# Patient Record
Sex: MALE | Race: WHITE | Employment: OTHER | URBAN - METROPOLITAN AREA
[De-identification: names, ages, dates, MRNs, and addresses within clinical notes are randomized per-mention and may not be internally consistent; named-entity substitution may affect disease eponyms.]

---

## 2017-01-09 ENCOUNTER — MED REC SCAN ONLY (OUTPATIENT)
Dept: INTERNAL MEDICINE CLINIC | Facility: CLINIC | Age: 77
End: 2017-01-09

## 2017-07-07 ENCOUNTER — APPOINTMENT (OUTPATIENT)
Dept: LAB | Age: 77
End: 2017-07-07
Attending: INTERNAL MEDICINE
Payer: COMMERCIAL

## 2017-07-07 DIAGNOSIS — E87.6 HYPOKALEMIA: ICD-10-CM

## 2017-07-07 DIAGNOSIS — E78.00 HYPERCHOLESTEROLEMIA: ICD-10-CM

## 2017-07-07 LAB
ALBUMIN SERPL-MCNC: 3.7 G/DL (ref 3.5–4.8)
ALP LIVER SERPL-CCNC: 77 U/L (ref 45–117)
ALT SERPL-CCNC: 32 U/L (ref 17–63)
AST SERPL-CCNC: 22 U/L (ref 15–41)
BILIRUB SERPL-MCNC: 0.7 MG/DL (ref 0.1–2)
BUN BLD-MCNC: 21 MG/DL (ref 8–20)
CALCIUM BLD-MCNC: 9 MG/DL (ref 8.3–10.3)
CHLORIDE: 105 MMOL/L (ref 101–111)
CHOLEST SMN-MCNC: 175 MG/DL (ref ?–200)
CO2: 32 MMOL/L (ref 22–32)
CREAT BLD-MCNC: 1.06 MG/DL (ref 0.7–1.3)
GLUCOSE BLD-MCNC: 104 MG/DL (ref 70–99)
HDLC SERPL-MCNC: 52 MG/DL (ref 45–?)
HDLC SERPL: 3.37 {RATIO} (ref ?–4.97)
LDLC SERPL CALC-MCNC: 104 MG/DL (ref ?–130)
M PROTEIN MFR SERPL ELPH: 7 G/DL (ref 6.1–8.3)
NONHDLC SERPL-MCNC: 123 MG/DL (ref ?–130)
POTASSIUM SERPL-SCNC: 4.3 MMOL/L (ref 3.6–5.1)
SODIUM SERPL-SCNC: 141 MMOL/L (ref 136–144)
TRIGLYCERIDES: 93 MG/DL (ref ?–150)
VLDL: 19 MG/DL (ref 5–40)

## 2017-07-07 PROCEDURE — 80053 COMPREHEN METABOLIC PANEL: CPT

## 2017-07-07 PROCEDURE — 80061 LIPID PANEL: CPT

## 2017-07-14 ENCOUNTER — OFFICE VISIT (OUTPATIENT)
Dept: INTERNAL MEDICINE CLINIC | Facility: CLINIC | Age: 77
End: 2017-07-14

## 2017-07-14 VITALS
HEART RATE: 54 BPM | HEIGHT: 71 IN | RESPIRATION RATE: 17 BRPM | SYSTOLIC BLOOD PRESSURE: 128 MMHG | OXYGEN SATURATION: 98 % | WEIGHT: 202 LBS | DIASTOLIC BLOOD PRESSURE: 66 MMHG | BODY MASS INDEX: 28.28 KG/M2 | TEMPERATURE: 98 F

## 2017-07-14 DIAGNOSIS — G89.29 CHRONIC PAIN OF BOTH SHOULDERS: Primary | ICD-10-CM

## 2017-07-14 DIAGNOSIS — M25.511 CHRONIC PAIN OF BOTH SHOULDERS: Primary | ICD-10-CM

## 2017-07-14 DIAGNOSIS — M25.512 CHRONIC PAIN OF BOTH SHOULDERS: Primary | ICD-10-CM

## 2017-07-14 DIAGNOSIS — E78.00 PURE HYPERCHOLESTEROLEMIA: ICD-10-CM

## 2017-07-14 DIAGNOSIS — I10 ESSENTIAL HYPERTENSION: ICD-10-CM

## 2017-07-14 PROCEDURE — 99213 OFFICE O/P EST LOW 20 MIN: CPT | Performed by: INTERNAL MEDICINE

## 2017-07-14 RX ORDER — METOPROLOL SUCCINATE 50 MG/1
50 TABLET, EXTENDED RELEASE ORAL DAILY
Qty: 90 TABLET | Refills: 1 | Status: SHIPPED | OUTPATIENT
Start: 2017-07-14 | End: 2017-07-16

## 2017-07-14 RX ORDER — ATORVASTATIN CALCIUM 20 MG/1
20 TABLET, FILM COATED ORAL DAILY
Qty: 90 TABLET | Refills: 1 | Status: SHIPPED | OUTPATIENT
Start: 2017-07-14 | End: 2017-07-16

## 2017-07-14 RX ORDER — HYDROCHLOROTHIAZIDE 25 MG/1
25 TABLET ORAL DAILY
Qty: 90 TABLET | Refills: 1 | Status: SHIPPED | OUTPATIENT
Start: 2017-07-14 | End: 2017-07-16

## 2017-07-14 RX ORDER — POTASSIUM CHLORIDE 750 MG/1
10 TABLET, FILM COATED, EXTENDED RELEASE ORAL DAILY
Qty: 90 TABLET | Refills: 2 | Status: SHIPPED | OUTPATIENT
Start: 2017-07-14 | End: 2018-04-02

## 2017-07-14 NOTE — PROGRESS NOTES
HPI:    Patient ID: Sudha Craft is a 78year old male.     HPI  Here for htn, and hyperchol, co bilateral shoulder discomfort and decreased rom  /66 (BP Location: Right arm, Patient Position: Sitting)   Pulse 54   Temp 97.6 °F (36.4 °C) (Oral) for pe    No orders of the defined types were placed in this encounter. Meds This Visit:  Signed Prescriptions Disp Refills    Metoprolol Succinate ER 50 MG Oral Tablet 24 Hr 90 tablet 1      Sig: Take 1 tablet (50 mg total) by mouth daily.       hydro

## 2017-07-16 DIAGNOSIS — I10 ESSENTIAL HYPERTENSION: ICD-10-CM

## 2017-07-17 RX ORDER — HYDROCHLOROTHIAZIDE 25 MG/1
25 TABLET ORAL DAILY
Qty: 90 TABLET | Refills: 1 | Status: SHIPPED
Start: 2017-07-17 | End: 2018-04-02

## 2017-07-17 RX ORDER — METOPROLOL SUCCINATE 50 MG/1
50 TABLET, EXTENDED RELEASE ORAL DAILY
Qty: 90 TABLET | Refills: 1 | Status: SHIPPED
Start: 2017-07-17 | End: 2018-04-02

## 2017-07-17 RX ORDER — POTASSIUM CHLORIDE 750 MG/1
10 TABLET, FILM COATED, EXTENDED RELEASE ORAL DAILY
Qty: 90 TABLET | Refills: 2
Start: 2017-07-17

## 2017-07-17 RX ORDER — ATORVASTATIN CALCIUM 20 MG/1
20 TABLET, FILM COATED ORAL DAILY
Qty: 90 TABLET | Refills: 1 | Status: SHIPPED
Start: 2017-07-17 | End: 2018-04-02

## 2017-07-17 NOTE — TELEPHONE ENCOUNTER
From: Thedora Snellen  Sent: 7/16/2017 9:16 PM CDT  Subject: Medication Renewal Request    Thedora Snellen would like a refill of the following medications:  Metoprolol Succinate ER 50 MG Oral Tablet 24 Hr [Sean Valdez MD]  hydrochlorothiazide

## 2017-07-26 ENCOUNTER — HOSPITAL ENCOUNTER (OUTPATIENT)
Dept: PHYSICAL THERAPY | Facility: HOSPITAL | Age: 77
Setting detail: THERAPIES SERIES
Discharge: HOME | End: 2017-07-26
Attending: INTERNAL MEDICINE
Payer: COMMERCIAL

## 2017-07-26 DIAGNOSIS — G89.29 CHRONIC PAIN OF BOTH SHOULDERS: ICD-10-CM

## 2017-07-26 DIAGNOSIS — M25.512 CHRONIC PAIN OF BOTH SHOULDERS: ICD-10-CM

## 2017-07-26 DIAGNOSIS — M25.511 CHRONIC PAIN OF BOTH SHOULDERS: ICD-10-CM

## 2017-07-26 PROCEDURE — 97161 PT EVAL LOW COMPLEX 20 MIN: CPT

## 2017-07-26 PROCEDURE — 97140 MANUAL THERAPY 1/> REGIONS: CPT

## 2017-07-26 PROCEDURE — 97110 THERAPEUTIC EXERCISES: CPT

## 2017-07-26 NOTE — PROGRESS NOTES
UPPER EXTREMITY EVALUATION:   Referring Physician: Dr. Kacey Bentonr  Diagnosis: Chronic pain of both shoulders (M25.512,G89.29,M25.511)     Date of Service: 7/26/2017     PATIENT João Bell is a 78year old y/o male who presents to therapy 160 (pain from 90 to end-range); L 160 (pain from 90 to end-range)  ER: R 70; L 80  IR: R T12 (much difficulty); L T12 (much difficulty)     PROM:   Shoulder    Flexion: R 160; L 160  Abduction: R 160; L 160  ER: R 90; L 70 at 60 deg of ABD  IR: R 30;  L 50 reach in back pocket, tuck in shirt, and turn steering wheel without pain (8 visits)  · Pt will demonstrate increased mid/low trap strength to 5-/5 to promote improved shoulder mechanics and stabilization with ADL such as lifting and reaching (8 visits)  ·

## 2017-07-31 ENCOUNTER — HOSPITAL ENCOUNTER (OUTPATIENT)
Dept: PHYSICAL THERAPY | Facility: HOSPITAL | Age: 77
Setting detail: THERAPIES SERIES
Discharge: HOME | End: 2017-07-31
Attending: INTERNAL MEDICINE
Payer: COMMERCIAL

## 2017-07-31 PROCEDURE — 97110 THERAPEUTIC EXERCISES: CPT

## 2017-07-31 PROCEDURE — 97140 MANUAL THERAPY 1/> REGIONS: CPT

## 2017-07-31 NOTE — PROGRESS NOTES
Dx: Chronic pain of both shoulders (M25.512,G89.29,M25.511)            Authorized # of Visits:  na         Next MD visit: none scheduled  Fall Risk: standard         Precautions: n/a             Subjective: Started doing HEP once per day.     Objective: L s R GHJ caudal and PA glide at end-range of FL and ABD MWM         (B) IR stretch and HOR ADD stretch         Red TB ER 3 x 10 (B)         Corner stretch (B)  Hor ADD stretch (B)         Skilled Services: pt education, manual therapy, progression of t

## 2017-08-02 ENCOUNTER — HOSPITAL ENCOUNTER (OUTPATIENT)
Dept: PHYSICAL THERAPY | Facility: HOSPITAL | Age: 77
Setting detail: THERAPIES SERIES
Discharge: HOME | End: 2017-08-02
Attending: INTERNAL MEDICINE
Payer: COMMERCIAL

## 2017-08-02 PROCEDURE — 97110 THERAPEUTIC EXERCISES: CPT

## 2017-08-02 PROCEDURE — 97140 MANUAL THERAPY 1/> REGIONS: CPT

## 2017-08-07 ENCOUNTER — HOSPITAL ENCOUNTER (OUTPATIENT)
Dept: PHYSICAL THERAPY | Facility: HOSPITAL | Age: 77
Setting detail: THERAPIES SERIES
Discharge: HOME | End: 2017-08-07
Attending: INTERNAL MEDICINE
Payer: COMMERCIAL

## 2017-08-07 PROCEDURE — 97110 THERAPEUTIC EXERCISES: CPT

## 2017-08-07 PROCEDURE — 97140 MANUAL THERAPY 1/> REGIONS: CPT

## 2017-08-07 NOTE — PROGRESS NOTES
Dx: Chronic pain of both shoulders (M25.512,G89.29,M25.511)            Authorized # of Visits:  na         Next MD visit: none scheduled  Fall Risk: standard         Precautions: n/a             Subjective: Reports some soreness in shoulders, about at the glide at 60 deg of ABD; at 90 deg of ABD L GHJ caudal glide at end-range of FL; ABD MWM L GHJ caudal glide at end-range of FL; ABD MWM       L shoulder ER mm energy stretch L GHJ PA glide at 60 deg of ABD; at 90 deg of ABD L GHJ PA glide at 60 deg of ABD;

## 2017-08-09 ENCOUNTER — HOSPITAL ENCOUNTER (OUTPATIENT)
Dept: PHYSICAL THERAPY | Facility: HOSPITAL | Age: 77
Setting detail: THERAPIES SERIES
Discharge: HOME | End: 2017-08-09
Attending: INTERNAL MEDICINE
Payer: COMMERCIAL

## 2017-08-09 PROCEDURE — 97110 THERAPEUTIC EXERCISES: CPT

## 2017-08-09 PROCEDURE — 97140 MANUAL THERAPY 1/> REGIONS: CPT

## 2017-08-09 NOTE — PROGRESS NOTES
Dx: Chronic pain of both shoulders (M25.512,G89.29,M25.511)            Authorized # of Visits:  na         Next MD visit: none scheduled  Fall Risk: standard         Precautions: n/a             Subjective: Both shoulders feel better.  It is easier to reach GHJ caudal glide at end-range of FL; ABD MWM Arm bike L1.8 4' 4' 4'      L GHJ PA glide at 60 deg of ABD; at 90 deg of ABD L GHJ caudal glide at end-range of FL; ABD MWM L GHJ caudal glide at end-range of FL; ABD MWM L GHJ caudal glide at end-range of FL;

## 2017-08-14 ENCOUNTER — HOSPITAL ENCOUNTER (OUTPATIENT)
Dept: PHYSICAL THERAPY | Facility: HOSPITAL | Age: 77
Setting detail: THERAPIES SERIES
Discharge: HOME | End: 2017-08-14
Attending: INTERNAL MEDICINE
Payer: COMMERCIAL

## 2017-08-14 PROCEDURE — 97110 THERAPEUTIC EXERCISES: CPT

## 2017-08-14 PROCEDURE — 97140 MANUAL THERAPY 1/> REGIONS: CPT

## 2017-08-14 NOTE — PROGRESS NOTES
Dx: Chronic pain of both shoulders (M25.512,G89.29,M25.511)            Authorized # of Visits:  na         Next MD visit: none scheduled  Fall Risk: standard         Precautions: n/a             Subjective: Both shoulders feel better.  It is easier to reach TX#: 7/ Date:               TX#: 8/   L GHJ caudal glide at end-range of FL; ABD MWM Arm bike L1.8 4' 4' 4' 4'     L GHJ PA glide at 60 deg of ABD; at 90 deg of ABD L GHJ caudal glide at end-range of FL; ABD MWM L GHJ caudal glide at end-range of FL

## 2017-08-16 ENCOUNTER — HOSPITAL ENCOUNTER (OUTPATIENT)
Dept: PHYSICAL THERAPY | Facility: HOSPITAL | Age: 77
Setting detail: THERAPIES SERIES
Discharge: HOME | End: 2017-08-16
Attending: INTERNAL MEDICINE
Payer: COMMERCIAL

## 2017-08-16 PROCEDURE — 97110 THERAPEUTIC EXERCISES: CPT

## 2017-08-16 PROCEDURE — 97140 MANUAL THERAPY 1/> REGIONS: CPT

## 2017-08-16 NOTE — PROGRESS NOTES
Dx: Chronic pain of both shoulders (M25.512,G89.29,M25.511)            Authorized # of Visits:  na         Next MD visit: none scheduled  Fall Risk: standard         Precautions: n/a             Subjective: Both shoulders feel better.  Reports decreased pascual progress achieved in PT (8 visits)      Plan: re-assess, review HEP  Date: 7/31/2017 TX#: 2/8 Date: 8/2/17               TX#: 3/8   Date:   8/7/17            TX#: 4/8 Date:  8/9/17             TX#: 5/8 Date:   8/14/17            TX#: 6/8 Date:  8/16/17

## 2017-08-21 ENCOUNTER — HOSPITAL ENCOUNTER (OUTPATIENT)
Dept: PHYSICAL THERAPY | Facility: HOSPITAL | Age: 77
Setting detail: THERAPIES SERIES
Discharge: HOME | End: 2017-08-21
Attending: INTERNAL MEDICINE
Payer: COMMERCIAL

## 2017-08-21 PROCEDURE — 97140 MANUAL THERAPY 1/> REGIONS: CPT

## 2017-08-21 PROCEDURE — 97110 THERAPEUTIC EXERCISES: CPT

## 2017-08-21 NOTE — PROGRESS NOTES
Physical Therapy Discharge Summary    Pt has attended 8 visits in Physical Therapy. Subjective: Lyubov Coleman reports that pain at night is less than before. He states that R shoulder no longer feels stiff or painful and has full range.  He notices improved ability to sleep without waking due to shoulder pain. Part met  · Pt will improve shoulder flexion AROM to >170 degrees to be able to reach into overhead cabinets without pain or restriction.  Part met  · Pt will improve shoulder abduction AROM to >170 degr L1.8 4' 4' 4' 4' 4' 4'   L GHJ PA glide at 60 deg of ABD; at 90 deg of ABD L GHJ caudal glide at end-range of FL; ABD MWM L GHJ caudal glide at end-range of FL; ABD MWM L GHJ caudal glide at end-range of FL; ABD MWM L L L   L shoulder ER mm energy stretch

## 2017-11-26 NOTE — PROGRESS NOTES
Dx: Chronic pain of both shoulders (M25.512,G89.29,M25.511)            Authorized # of Visits:  na         Next MD visit: none scheduled  Fall Risk: standard         Precautions: n/a             Subjective: Shoulders feel better and he noticed less pain at caudal glide at end-range of FL; ABD MWM Arm bike L1.8 4'        L GHJ PA glide at 60 deg of ABD; at 90 deg of ABD L GHJ caudal glide at end-range of FL; ABD MWM        L shoulder ER mm energy stretch L GHJ PA glide at 60 deg of ABD; at 90 deg of ABD WDL

## 2018-04-02 ENCOUNTER — OFFICE VISIT (OUTPATIENT)
Dept: INTERNAL MEDICINE CLINIC | Facility: CLINIC | Age: 78
End: 2018-04-02

## 2018-04-02 VITALS
BODY MASS INDEX: 28.7 KG/M2 | RESPIRATION RATE: 16 BRPM | WEIGHT: 205 LBS | HEIGHT: 71 IN | TEMPERATURE: 98 F | DIASTOLIC BLOOD PRESSURE: 80 MMHG | HEART RATE: 60 BPM | SYSTOLIC BLOOD PRESSURE: 136 MMHG

## 2018-04-02 DIAGNOSIS — G89.29 CHRONIC BILATERAL LOW BACK PAIN WITH LEFT-SIDED SCIATICA: ICD-10-CM

## 2018-04-02 DIAGNOSIS — Z12.5 PROSTATE CANCER SCREENING: ICD-10-CM

## 2018-04-02 DIAGNOSIS — Z13.6 ENCOUNTER FOR ABDOMINAL AORTIC ANEURYSM (AAA) SCREENING: ICD-10-CM

## 2018-04-02 DIAGNOSIS — Z00.00 ROUTINE GENERAL MEDICAL EXAMINATION AT A HEALTH CARE FACILITY: Primary | ICD-10-CM

## 2018-04-02 DIAGNOSIS — I10 ESSENTIAL HYPERTENSION: ICD-10-CM

## 2018-04-02 DIAGNOSIS — E78.00 PURE HYPERCHOLESTEROLEMIA: ICD-10-CM

## 2018-04-02 DIAGNOSIS — M54.42 CHRONIC BILATERAL LOW BACK PAIN WITH LEFT-SIDED SCIATICA: ICD-10-CM

## 2018-04-02 PROCEDURE — 99213 OFFICE O/P EST LOW 20 MIN: CPT | Performed by: INTERNAL MEDICINE

## 2018-04-02 PROCEDURE — G0439 PPPS, SUBSEQ VISIT: HCPCS | Performed by: INTERNAL MEDICINE

## 2018-04-02 RX ORDER — HYDROCHLOROTHIAZIDE 25 MG/1
25 TABLET ORAL DAILY
Qty: 90 TABLET | Refills: 3 | Status: SHIPPED | OUTPATIENT
Start: 2018-04-02 | End: 2019-05-29

## 2018-04-02 RX ORDER — METOPROLOL SUCCINATE 50 MG/1
50 TABLET, EXTENDED RELEASE ORAL DAILY
Qty: 90 TABLET | Refills: 3 | Status: SHIPPED | OUTPATIENT
Start: 2018-04-02 | End: 2019-05-29

## 2018-04-02 RX ORDER — ATORVASTATIN CALCIUM 20 MG/1
20 TABLET, FILM COATED ORAL DAILY
Qty: 90 TABLET | Refills: 3 | Status: SHIPPED | OUTPATIENT
Start: 2018-04-02 | End: 2019-05-29

## 2018-04-03 NOTE — PROGRESS NOTES
HPI:    Patient ID: Jasvir  is a 78year old male. HPI  Here for awv, feels will, htn, hyperchol, taking and tolerating meds, see awv form  /80 (BP Location: Right arm, Patient Position: Sitting, Cuff Size: adult)   Pulse 60   Temp 97. 7 exhibits no edema. Neurological: He is oriented to person, place, and time. No cranial nerve deficit. Skin: Skin is warm. He is not diaphoretic. Psychiatric: He has a normal mood and affect.               ASSESSMENT/PLAN:   Routine general medical exa

## 2018-04-06 ENCOUNTER — LAB ENCOUNTER (OUTPATIENT)
Dept: LAB | Age: 78
End: 2018-04-06
Attending: INTERNAL MEDICINE
Payer: COMMERCIAL

## 2018-04-06 ENCOUNTER — TELEPHONE (OUTPATIENT)
Dept: INTERNAL MEDICINE CLINIC | Facility: CLINIC | Age: 78
End: 2018-04-06

## 2018-04-06 DIAGNOSIS — E78.00 PURE HYPERCHOLESTEROLEMIA: ICD-10-CM

## 2018-04-06 DIAGNOSIS — Z12.5 PROSTATE CANCER SCREENING: ICD-10-CM

## 2018-04-06 DIAGNOSIS — I10 ESSENTIAL HYPERTENSION: ICD-10-CM

## 2018-04-06 DIAGNOSIS — I10 ESSENTIAL HYPERTENSION: Primary | ICD-10-CM

## 2018-04-06 PROCEDURE — 80061 LIPID PANEL: CPT

## 2018-04-06 PROCEDURE — 85025 COMPLETE CBC W/AUTO DIFF WBC: CPT

## 2018-04-06 PROCEDURE — 36415 COLL VENOUS BLD VENIPUNCTURE: CPT

## 2018-04-06 PROCEDURE — 80053 COMPREHEN METABOLIC PANEL: CPT

## 2018-04-06 NOTE — TELEPHONE ENCOUNTER
Calling to inform JL the AST and Potassium were hemolyzed the other test are fine if he needs the AST and potassium will need to re order.  Please advise

## 2018-04-09 NOTE — TELEPHONE ENCOUNTER
Called patient LM on VM informing CMP needs to be repeated due to hemolyzed blood. Patient to call back with any questions.

## 2018-05-11 ENCOUNTER — TELEPHONE (OUTPATIENT)
Dept: INTERNAL MEDICINE CLINIC | Facility: CLINIC | Age: 78
End: 2018-05-11

## 2018-05-11 ENCOUNTER — APPOINTMENT (OUTPATIENT)
Dept: LAB | Age: 78
End: 2018-05-11
Attending: INTERNAL MEDICINE
Payer: COMMERCIAL

## 2018-05-11 DIAGNOSIS — I10 ESSENTIAL HYPERTENSION: ICD-10-CM

## 2018-05-11 DIAGNOSIS — M54.41 CHRONIC RIGHT-SIDED LOW BACK PAIN WITH RIGHT-SIDED SCIATICA: Primary | ICD-10-CM

## 2018-05-11 DIAGNOSIS — G89.29 CHRONIC RIGHT-SIDED LOW BACK PAIN WITH RIGHT-SIDED SCIATICA: Primary | ICD-10-CM

## 2018-05-11 PROCEDURE — 80053 COMPREHEN METABOLIC PANEL: CPT

## 2018-05-11 NOTE — TELEPHONE ENCOUNTER
Patient stopped by the office requesting to speak with a nurse in regards to an upcoming procedure. He states it's in regards to a referral Dr Fawn Guerrier made to the urologist - MRI of prostate.

## 2018-05-11 NOTE — TELEPHONE ENCOUNTER
Patient asking if JL can order an MRI of his lower back which pain was discussed at his CPE appt 4/2/18.   Pt states he has low back pain, worse in the am, takes Aleve 2 tabs in the am which helps with the discomfort, pain rated at a 5, does have some sciat

## 2018-06-07 ENCOUNTER — OFFICE VISIT (OUTPATIENT)
Dept: SURGERY | Facility: CLINIC | Age: 78
End: 2018-06-07

## 2018-06-07 VITALS
BODY MASS INDEX: 27.3 KG/M2 | HEIGHT: 71 IN | DIASTOLIC BLOOD PRESSURE: 78 MMHG | WEIGHT: 195 LBS | RESPIRATION RATE: 16 BRPM | SYSTOLIC BLOOD PRESSURE: 135 MMHG | HEART RATE: 60 BPM | OXYGEN SATURATION: 97 %

## 2018-06-07 DIAGNOSIS — M51.36 DDD (DEGENERATIVE DISC DISEASE), LUMBAR: Primary | ICD-10-CM

## 2018-06-07 DIAGNOSIS — M47.812 SPONDYLOSIS OF CERVICAL REGION WITHOUT MYELOPATHY OR RADICULOPATHY: ICD-10-CM

## 2018-06-07 PROBLEM — M51.369 DDD (DEGENERATIVE DISC DISEASE), LUMBAR: Status: ACTIVE | Noted: 2018-06-07

## 2018-06-07 PROCEDURE — 99203 OFFICE O/P NEW LOW 30 MIN: CPT | Performed by: ANESTHESIOLOGY

## 2018-06-07 NOTE — H&P
Name: Tory Enciso   : 1940   DOS: 2018     Chief complaint: Low back pain    History of present illness:  Tory Enciso is a 78year old male with a one-year history of low back pain.   The patient complains of primarily axial low crow Years: 0.00         Quit date: 2/28/1985  Smokeless tobacco: Never Used                      Alcohol use: Yes           1.0 - 1.5 oz/week     Glasses of wine: 2 - 3 per week     Comment: cage 4/2/18      Review of  other systems:  A 10 point ROS was conduc diagnosis)  Spondylosis of cervical region without myelopathy or radiculopathy. Plan:     Patient is a 68-year-old gentleman with a one-year history of low back pain. Based upon his symptoms, I believe his pain is secondary to lumbar facet issues.   I

## 2018-06-07 NOTE — PROGRESS NOTES
HPI:    Patient ID: Sage Petersen is a 78year old male. HPI    Review of Systems         Current Outpatient Prescriptions:  Sulfamethoxazole-TMP -160 MG Oral Tab per tablet Take 1 tablet by mouth 2 (two) times daily.  Begin this medication th

## 2018-06-07 NOTE — PATIENT INSTRUCTIONS
Refill policies:    • Allow 2-3 business days for refills; controlled substances may take longer.   • Contact your pharmacy at least 5 days prior to running out of medication and have them send an electronic request or submit request through the “request re entire amount billed. Precertification and Prior Authorizations: If your physician has recommended that you have a procedure or additional testing performed.   [facility]) will contact your insurance carrier to obtain pre-certi

## 2018-06-08 ENCOUNTER — OFFICE VISIT (OUTPATIENT)
Dept: FAMILY MEDICINE CLINIC | Facility: CLINIC | Age: 78
End: 2018-06-08

## 2018-06-08 VITALS
SYSTOLIC BLOOD PRESSURE: 140 MMHG | RESPIRATION RATE: 16 BRPM | TEMPERATURE: 98 F | HEART RATE: 58 BPM | OXYGEN SATURATION: 99 % | DIASTOLIC BLOOD PRESSURE: 74 MMHG

## 2018-06-08 DIAGNOSIS — S09.301A INJURY OF TYMPANIC MEMBRANE OF RIGHT EAR, INITIAL ENCOUNTER: ICD-10-CM

## 2018-06-08 DIAGNOSIS — S00.411A EAR CANAL ABRASION, RIGHT, INITIAL ENCOUNTER: Primary | ICD-10-CM

## 2018-06-08 PROCEDURE — 99213 OFFICE O/P EST LOW 20 MIN: CPT | Performed by: NURSE PRACTITIONER

## 2018-06-08 RX ORDER — OFLOXACIN 3 MG/ML
10 SOLUTION AURICULAR (OTIC) DAILY
Qty: 1 BOTTLE | Refills: 0 | Status: SHIPPED | OUTPATIENT
Start: 2018-06-08 | End: 2018-06-15

## 2018-06-08 NOTE — PATIENT INSTRUCTIONS
Please follow up with your doctor in the next 2-3 days for reevaluation. Please go to the immediate care or ER if bleeding starts again or if you develop any worsening symptoms such as pain or difficulty hearing. Please keep water out of your ear.       R © 6252-8406 [employer] [address]. [address] [facility] [place], [facility] [place]. All rights reserved. This information is not intended as a substitute for professional medical care. Always follow your healthcare professional's instructions.         Externa · If you feel water trapped in your ear, use ear drops right away. You can get these drops over the counter at most drugstores.  They work by removing water from the ear canal.  Follow-up care  Follow up with your healthcare provider in 1 week, or as advise · You may use acetaminophen or ibuprofen to control pain, unless another medicine was prescribed.  Note: If you have chronic liver or kidney disease or ever had a stomach ulcer or GI bleeding, talk to your healthcare provider before taking any of these medi

## 2018-06-08 NOTE — PROGRESS NOTES
CHIEF COMPLAINT:   Patient presents with:  Ear Problem      HPI:   Rashawn Zacarias is a 78year old male who presents to clinic today with complaints of bleeding from right ear and muffled hearing.  Pt states last night he was cleaning his ear with a q /74 (BP Location: Right arm)   Pulse 58   Temp 98.1 °F (36.7 °C) (Oral)   Resp 16   SpO2 99%   GENERAL: well developed, well nourished,in no apparent distress  SKIN: no rashes,no suspicious lesions  HEAD: atraumatic, normocephalic  EYES: conjunctiva PLAN: Pt reported hearing now normal after removal of clot from right EAC. Meds as listed below. Comfort measures as described in Patient Instructions. Advised follow up with PCP or ENT in 2-3 days for reevaluation.  Advised follow up sooner if pt develops · Keep a clean cotton ball in the ear canal to absorb drainage. Change the cotton often, when it becomes soiled with fluid drainage. Don’t let water get into the ear.  Don’t put any medicine drops into the ear unless your healthcare provider tells you to do · Use prescribed ear drops as directed. These help reduce swelling and fight the infection. If an ear wick was placed in the ear canal, apply drops right onto the end of the wick.  The wick will draw the medicine into the ear canal even if it is swollen maryann External otitis (also called “swimmer’s ear”) is an infection in the ear canal. It is often caused by bacteria or fungus. It can occur a few days after water gets trapped in the ear canal (from swimming or bathing).  It can also occur after cleaning too shira · Ear pain becomes worse or doesn’t improve after 3 days of treatment  · Redness or swelling of the outer ear occurs or gets worse  · Headache  · Painful or stiff neck  · Drowsiness or confusion  · Fever of 100.4ºF (38ºC) or higher, or as directed by your

## 2018-06-11 ENCOUNTER — LAB ENCOUNTER (OUTPATIENT)
Dept: LAB | Age: 78
End: 2018-06-11
Attending: UROLOGY
Payer: MEDICARE

## 2018-06-11 DIAGNOSIS — Z12.5 SPECIAL SCREENING FOR MALIGNANT NEOPLASM OF PROSTATE: ICD-10-CM

## 2018-06-11 PROCEDURE — 87077 CULTURE AEROBIC IDENTIFY: CPT

## 2018-06-11 PROCEDURE — 87086 URINE CULTURE/COLONY COUNT: CPT

## 2018-06-11 PROCEDURE — 81001 URINALYSIS AUTO W/SCOPE: CPT

## 2018-06-14 PROCEDURE — 88305 TISSUE EXAM BY PATHOLOGIST: CPT | Performed by: UROLOGY

## 2018-06-14 PROCEDURE — 88344 IMHCHEM/IMCYTCHM EA MLT ANTB: CPT | Performed by: UROLOGY

## 2018-06-20 DIAGNOSIS — I10 ESSENTIAL HYPERTENSION: ICD-10-CM

## 2018-06-20 RX ORDER — HYDROCHLOROTHIAZIDE 25 MG/1
TABLET ORAL
Qty: 90 TABLET | Refills: 0 | OUTPATIENT
Start: 2018-06-20

## 2018-06-20 RX ORDER — ATORVASTATIN CALCIUM 20 MG/1
TABLET, FILM COATED ORAL
Qty: 90 TABLET | Refills: 0 | OUTPATIENT
Start: 2018-06-20

## 2018-06-20 RX ORDER — METOPROLOL SUCCINATE 50 MG/1
TABLET, EXTENDED RELEASE ORAL
Qty: 90 TABLET | Refills: 0 | OUTPATIENT
Start: 2018-06-20

## 2018-09-06 ENCOUNTER — TELEPHONE (OUTPATIENT)
Dept: SURGERY | Facility: CLINIC | Age: 78
End: 2018-09-06

## 2018-09-06 NOTE — TELEPHONE ENCOUNTER
rcvd medicare initial evaluation from 9/5/18 from [address] [place] [address].  Endorsed to Dr Vipin Cuba

## 2018-10-19 ENCOUNTER — APPOINTMENT (OUTPATIENT)
Dept: LAB | Age: 78
End: 2018-10-19
Attending: UROLOGY
Payer: MEDICARE

## 2018-10-19 DIAGNOSIS — C61 PROSTATE CANCER (HCC): ICD-10-CM

## 2018-10-19 PROCEDURE — 84153 ASSAY OF PSA TOTAL: CPT

## 2018-10-19 PROCEDURE — 36415 COLL VENOUS BLD VENIPUNCTURE: CPT

## 2019-02-18 ENCOUNTER — TELEPHONE (OUTPATIENT)
Dept: INTERNAL MEDICINE CLINIC | Facility: CLINIC | Age: 79
End: 2019-02-18

## 2019-02-18 DIAGNOSIS — I10 ESSENTIAL HYPERTENSION: ICD-10-CM

## 2019-02-18 DIAGNOSIS — E78.00 PURE HYPERCHOLESTEROLEMIA: Primary | ICD-10-CM

## 2019-02-18 NOTE — TELEPHONE ENCOUNTER
Future Appointments   Date Time Provider Emanuel Donnelly   5/29/2019  8:00 AM Jeffery Kelly MD [facility]     Orders to edward- Pt aware to fast-no call back required    This is medicare wellness visit

## 2019-02-27 ENCOUNTER — APPOINTMENT (OUTPATIENT)
Dept: LAB | Age: 79
End: 2019-02-27
Attending: UROLOGY
Payer: MEDICARE

## 2019-02-27 DIAGNOSIS — C61 PROSTATE CANCER (HCC): ICD-10-CM

## 2019-02-27 LAB — PSA SERPL-MCNC: 9.47 NG/ML (ref ?–4)

## 2019-02-27 PROCEDURE — 36415 COLL VENOUS BLD VENIPUNCTURE: CPT

## 2019-02-27 PROCEDURE — 84153 ASSAY OF PSA TOTAL: CPT

## 2019-05-23 ENCOUNTER — LAB ENCOUNTER (OUTPATIENT)
Dept: LAB | Age: 79
End: 2019-05-23
Attending: INTERNAL MEDICINE
Payer: MEDICARE

## 2019-05-23 DIAGNOSIS — E78.00 PURE HYPERCHOLESTEROLEMIA: ICD-10-CM

## 2019-05-23 DIAGNOSIS — I10 ESSENTIAL HYPERTENSION: ICD-10-CM

## 2019-05-23 PROCEDURE — 80061 LIPID PANEL: CPT

## 2019-05-23 PROCEDURE — 80053 COMPREHEN METABOLIC PANEL: CPT

## 2019-05-23 PROCEDURE — 85025 COMPLETE CBC W/AUTO DIFF WBC: CPT

## 2019-05-23 PROCEDURE — 36415 COLL VENOUS BLD VENIPUNCTURE: CPT

## 2019-05-29 ENCOUNTER — OFFICE VISIT (OUTPATIENT)
Dept: INTERNAL MEDICINE CLINIC | Facility: CLINIC | Age: 79
End: 2019-05-29
Payer: MEDICARE

## 2019-05-29 VITALS
RESPIRATION RATE: 16 BRPM | HEART RATE: 60 BPM | BODY MASS INDEX: 28 KG/M2 | DIASTOLIC BLOOD PRESSURE: 64 MMHG | WEIGHT: 201.38 LBS | SYSTOLIC BLOOD PRESSURE: 128 MMHG

## 2019-05-29 DIAGNOSIS — M54.42 CHRONIC BILATERAL LOW BACK PAIN WITH BILATERAL SCIATICA: ICD-10-CM

## 2019-05-29 DIAGNOSIS — G89.29 CHRONIC BILATERAL LOW BACK PAIN WITH BILATERAL SCIATICA: ICD-10-CM

## 2019-05-29 DIAGNOSIS — E78.00 PURE HYPERCHOLESTEROLEMIA: Primary | ICD-10-CM

## 2019-05-29 DIAGNOSIS — M54.41 CHRONIC BILATERAL LOW BACK PAIN WITH BILATERAL SCIATICA: ICD-10-CM

## 2019-05-29 DIAGNOSIS — I10 ESSENTIAL HYPERTENSION: ICD-10-CM

## 2019-05-29 DIAGNOSIS — Z00.00 ROUTINE GENERAL MEDICAL EXAMINATION AT A HEALTH CARE FACILITY: ICD-10-CM

## 2019-05-29 DIAGNOSIS — Z12.11 SPECIAL SCREENING FOR MALIGNANT NEOPLASMS, COLON: ICD-10-CM

## 2019-05-29 PROCEDURE — 99213 OFFICE O/P EST LOW 20 MIN: CPT | Performed by: INTERNAL MEDICINE

## 2019-05-29 PROCEDURE — G0439 PPPS, SUBSEQ VISIT: HCPCS | Performed by: INTERNAL MEDICINE

## 2019-05-29 RX ORDER — PRAVASTATIN SODIUM 20 MG
20 TABLET ORAL NIGHTLY
Qty: 90 TABLET | Refills: 1 | Status: SHIPPED | OUTPATIENT
Start: 2019-05-29 | End: 2019-09-04

## 2019-05-29 RX ORDER — METOPROLOL SUCCINATE 50 MG/1
50 TABLET, EXTENDED RELEASE ORAL DAILY
Qty: 90 TABLET | Refills: 3 | Status: SHIPPED | OUTPATIENT
Start: 2019-05-29 | End: 2020-05-06

## 2019-05-29 RX ORDER — HYDROCHLOROTHIAZIDE 25 MG/1
25 TABLET ORAL DAILY
Qty: 90 TABLET | Refills: 3 | Status: SHIPPED | OUTPATIENT
Start: 2019-05-29 | End: 2020-05-06

## 2019-05-29 NOTE — PROGRESS NOTES
HPI:    Patient ID: Rolan Morgan is a 70year old male.     HPI  Here for awv, see form filled out for details, hyperchol stopped statin as had shoulder and thigh myalgias, htn, chronic low back pain now with bilateral leg pain down through buttocks a murmur heard. Pulmonary/Chest: Effort normal and breath sounds normal. He has no wheezes. Abdominal: Soft. There is no tenderness. Genitourinary: Prostate normal and penis normal.   Musculoskeletal: He exhibits no edema.    Neurological: He is oriented

## 2019-07-01 ENCOUNTER — HOSPITAL ENCOUNTER (OUTPATIENT)
Dept: LAB | Facility: HOSPITAL | Age: 79
Discharge: HOME | End: 2019-07-01
Attending: UROLOGY
Payer: MEDICARE

## 2019-07-01 ENCOUNTER — OFFICE VISIT (OUTPATIENT)
Dept: SURGERY | Facility: CLINIC | Age: 79
End: 2019-07-01
Payer: MEDICARE

## 2019-07-01 VITALS
BODY MASS INDEX: 27.72 KG/M2 | SYSTOLIC BLOOD PRESSURE: 140 MMHG | WEIGHT: 198 LBS | HEART RATE: 62 BPM | DIASTOLIC BLOOD PRESSURE: 72 MMHG | HEIGHT: 71 IN

## 2019-07-01 DIAGNOSIS — M51.36 DDD (DEGENERATIVE DISC DISEASE), LUMBAR: ICD-10-CM

## 2019-07-01 DIAGNOSIS — M48.061 FORAMINAL STENOSIS OF LUMBAR REGION: ICD-10-CM

## 2019-07-01 DIAGNOSIS — M54.41 CHRONIC BILATERAL LOW BACK PAIN WITH BILATERAL SCIATICA: ICD-10-CM

## 2019-07-01 DIAGNOSIS — M47.817 FACET ARTHRITIS OF LUMBOSACRAL REGION: ICD-10-CM

## 2019-07-01 DIAGNOSIS — G89.29 CHRONIC BILATERAL LOW BACK PAIN WITH BILATERAL SCIATICA: ICD-10-CM

## 2019-07-01 DIAGNOSIS — M48.062 SPINAL STENOSIS OF LUMBAR REGION WITH NEUROGENIC CLAUDICATION: Primary | ICD-10-CM

## 2019-07-01 DIAGNOSIS — M54.42 CHRONIC BILATERAL LOW BACK PAIN WITH BILATERAL SCIATICA: ICD-10-CM

## 2019-07-01 DIAGNOSIS — C61 PROSTATE CANCER (HCC): ICD-10-CM

## 2019-07-01 DIAGNOSIS — M24.28 LIGAMENTUM FLAVUM HYPERTROPHY: ICD-10-CM

## 2019-07-01 LAB — PSA SERPL-MCNC: 10.5 NG/ML (ref ?–4)

## 2019-07-01 PROCEDURE — 99204 OFFICE O/P NEW MOD 45 MIN: CPT | Performed by: NEUROLOGICAL SURGERY

## 2019-07-01 PROCEDURE — 84153 ASSAY OF PSA TOTAL: CPT

## 2019-07-01 PROCEDURE — 36415 COLL VENOUS BLD VENIPUNCTURE: CPT

## 2019-07-01 NOTE — H&P
Patient: Dontae Givens  Medical Record Number: [id number]  YOB: 1940  PCP: Aquiles Milian MD    Referring Physician: Dr. Faith Germain  Reason for visit: Chronic bilateral low back pain with bilateral sciatica    Dear Dr. Faith Germain   Thank Lump or mass in breast    • Psychosexual dysfunction with inhibited sexual excitement    • Sciatica       Past Surgical History:   Procedure Laterality Date   • COLONOSCOPY  1/5/17    Dr. Gisela Millan   • TONSILLECTOMY      with adenoidectomy      Family History alert and orientated. Speech fluent, comprehension intact, answering questions appropriately. SPINE:  Gait/Coordination: Intact, non-antalgic gait. Able to heel balance on one foot bilaterally, reports equal strength.  Unable to toe balance on right toe T12-L1 level. The distal visualized cord appears  grossly unremarkable, but evaluation is very limited. Discs: Mild decreased disc height at L3-4 and L4-5 levels.   Vertebral body heights: Well-maintained  T12-L1: Unremarkable  L1-L2: Unremarkable  L2-L3: but the is incompletely evaluated.  =====  IMPRESSION:   Most significant findings at L4-5 and L3-L4 levels. Please correlate clinically for L3, L4, and L5  radiculopathy.  Please see above for details and additional findings at other levels of the lumbar counseling. Thank you very much for the kind referral.  Respectfully yours,    Kailyn Bowden.  Pietro Odell M.S., PA-C  [facility]  [address] [facility], [facility], [address] [place] [address] Rd  [phone]  7/1/20

## 2019-07-01 NOTE — PROGRESS NOTES
Location of Pain:  Pt states that he has lower bacjk pain. Pt states that he has pain in the bilateral legs more so in the right. Pt states that he has issues with numbness and tingling, Pt states that he has weakness.  Pt states that he has no issues with

## 2019-08-21 ENCOUNTER — HOSPITAL ENCOUNTER (INPATIENT)
Facility: HOSPITAL | Age: 79
LOS: 3 days | Discharge: HOME | DRG: 340 | End: 2019-08-24
Attending: EMERGENCY MEDICINE | Admitting: COLON & RECTAL SURGERY
Payer: MEDICARE

## 2019-08-21 ENCOUNTER — OFFICE VISIT (OUTPATIENT)
Dept: INTERNAL MEDICINE CLINIC | Facility: CLINIC | Age: 79
End: 2019-08-21
Payer: MEDICARE

## 2019-08-21 ENCOUNTER — ANESTHESIA EVENT (OUTPATIENT)
Dept: SURGERY | Facility: HOSPITAL | Age: 79
DRG: 340 | End: 2019-08-21
Payer: MEDICARE

## 2019-08-21 ENCOUNTER — ANESTHESIA (OUTPATIENT)
Dept: SURGERY | Facility: HOSPITAL | Age: 79
DRG: 340 | End: 2019-08-21
Payer: MEDICARE

## 2019-08-21 ENCOUNTER — HOSPITAL ENCOUNTER (OUTPATIENT)
Dept: CT IMAGING | Facility: HOSPITAL | Age: 79
Discharge: HOME | DRG: 340 | End: 2019-08-21
Attending: NURSE PRACTITIONER
Payer: MEDICARE

## 2019-08-21 ENCOUNTER — TELEPHONE (OUTPATIENT)
Dept: INTERNAL MEDICINE CLINIC | Facility: CLINIC | Age: 79
End: 2019-08-21

## 2019-08-21 ENCOUNTER — LAB ENCOUNTER (OUTPATIENT)
Dept: LAB | Facility: HOSPITAL | Age: 79
DRG: 340 | End: 2019-08-21
Attending: NURSE PRACTITIONER
Payer: MEDICARE

## 2019-08-21 VITALS
HEIGHT: 71 IN | HEART RATE: 64 BPM | WEIGHT: 195 LBS | TEMPERATURE: 98 F | BODY MASS INDEX: 27.3 KG/M2 | SYSTOLIC BLOOD PRESSURE: 120 MMHG | DIASTOLIC BLOOD PRESSURE: 60 MMHG

## 2019-08-21 DIAGNOSIS — R19.7 DIARRHEA, UNSPECIFIED TYPE: ICD-10-CM

## 2019-08-21 DIAGNOSIS — R10.31 RIGHT LOWER QUADRANT ABDOMINAL PAIN: Primary | ICD-10-CM

## 2019-08-21 DIAGNOSIS — R10.84 GENERALIZED ABDOMINAL PAIN: ICD-10-CM

## 2019-08-21 DIAGNOSIS — E78.00 PURE HYPERCHOLESTEROLEMIA: ICD-10-CM

## 2019-08-21 DIAGNOSIS — K37 APPENDICITIS: ICD-10-CM

## 2019-08-21 DIAGNOSIS — R10.31 RIGHT LOWER QUADRANT ABDOMINAL PAIN: ICD-10-CM

## 2019-08-21 DIAGNOSIS — K35.20 ACUTE APPENDICITIS WITH GENERALIZED PERITONITIS, WITHOUT GANGRENE OR ABSCESS, UNSPECIFIED WHETHER PERFORATION PRESENT: Primary | ICD-10-CM

## 2019-08-21 PROBLEM — K35.209 ACUTE APPENDICITIS WITH GENERALIZED PERITONITIS, WITHOUT GANGRENE OR ABSCESS, UNSPECIFIED WHETHER PERFORATION PRESENT: Status: ACTIVE | Noted: 2019-08-21

## 2019-08-21 PROBLEM — K35.209 ACUTE APPENDICITIS WITH GENERALIZED PERITONITIS, WITHOUT GANGRENE OR ABSCESS: Status: ACTIVE | Noted: 2019-08-21

## 2019-08-21 LAB
ALBUMIN SERPL-MCNC: 3 G/DL (ref 3.4–5)
ALBUMIN/GLOB SERPL: 0.7 {RATIO} (ref 1–2)
ALP LIVER SERPL-CCNC: 70 U/L (ref 45–117)
ALT SERPL-CCNC: 23 U/L (ref 16–61)
AMYLASE SERPL-CCNC: 18 U/L (ref 25–115)
ANION GAP SERPL CALC-SCNC: 9 MMOL/L (ref 0–18)
AST SERPL-CCNC: 19 U/L (ref 15–37)
BASOPHILS # BLD AUTO: 0.02 X10(3) UL (ref 0–0.2)
BASOPHILS NFR BLD AUTO: 0.1 %
BILIRUB SERPL-MCNC: 1.5 MG/DL (ref 0.1–2)
BUN BLD-MCNC: 17 MG/DL (ref 7–18)
BUN/CREAT SERPL: 15.5 (ref 10–20)
CALCIUM BLD-MCNC: 9.5 MG/DL (ref 8.5–10.1)
CHLORIDE SERPL-SCNC: 100 MMOL/L (ref 98–112)
CHOLEST SMN-MCNC: 146 MG/DL (ref ?–200)
CO2 SERPL-SCNC: 27 MMOL/L (ref 21–32)
CREAT BLD-MCNC: 1.1 MG/DL (ref 0.7–1.3)
CREAT BLD-MCNC: 1.3 MG/DL (ref 0.7–1.3)
DEPRECATED RDW RBC AUTO: 42.9 FL (ref 35.1–46.3)
EOSINOPHIL # BLD AUTO: 0.04 X10(3) UL (ref 0–0.7)
EOSINOPHIL NFR BLD AUTO: 0.2 %
ERYTHROCYTE [DISTWIDTH] IN BLOOD BY AUTOMATED COUNT: 13 % (ref 11–15)
GLOBULIN PLAS-MCNC: 4.5 G/DL (ref 2.8–4.4)
GLUCOSE BLD-MCNC: 96 MG/DL (ref 70–99)
HCT VFR BLD AUTO: 46.4 % (ref 39–53)
HDLC SERPL-MCNC: 48 MG/DL (ref 40–59)
HGB BLD-MCNC: 15.6 G/DL (ref 13–17.5)
IMM GRANULOCYTES # BLD AUTO: 0.09 X10(3) UL (ref 0–1)
IMM GRANULOCYTES NFR BLD: 0.5 %
LDLC SERPL CALC-MCNC: 76 MG/DL (ref ?–100)
LIPASE SERPL-CCNC: 75 U/L (ref 73–393)
LYMPHOCYTES # BLD AUTO: 0.84 X10(3) UL (ref 1–4)
LYMPHOCYTES NFR BLD AUTO: 4.8 %
M PROTEIN MFR SERPL ELPH: 7.5 G/DL (ref 6.4–8.2)
MCH RBC QN AUTO: 30.5 PG (ref 26–34)
MCHC RBC AUTO-ENTMCNC: 33.6 G/DL (ref 31–37)
MCV RBC AUTO: 90.6 FL (ref 80–100)
MONOCYTES # BLD AUTO: 0.81 X10(3) UL (ref 0.1–1)
MONOCYTES NFR BLD AUTO: 4.6 %
NEUTROPHILS # BLD AUTO: 15.78 X10 (3) UL (ref 1.5–7.7)
NEUTROPHILS # BLD AUTO: 15.78 X10(3) UL (ref 1.5–7.7)
NEUTROPHILS NFR BLD AUTO: 89.8 %
NONHDLC SERPL-MCNC: 98 MG/DL (ref ?–130)
OSMOLALITY SERPL CALC.SUM OF ELEC: 283 MOSM/KG (ref 275–295)
PLATELET # BLD AUTO: 182 10(3)UL (ref 150–450)
POTASSIUM SERPL-SCNC: 3.8 MMOL/L (ref 3.5–5.1)
RBC # BLD AUTO: 5.12 X10(6)UL (ref 3.8–5.8)
SODIUM SERPL-SCNC: 136 MMOL/L (ref 136–145)
TRIGL SERPL-MCNC: 108 MG/DL (ref 30–149)
VLDLC SERPL CALC-MCNC: 22 MG/DL (ref 0–30)
WBC # BLD AUTO: 17.6 X10(3) UL (ref 4–11)

## 2019-08-21 PROCEDURE — 36415 COLL VENOUS BLD VENIPUNCTURE: CPT

## 2019-08-21 PROCEDURE — 82565 ASSAY OF CREATININE: CPT

## 2019-08-21 PROCEDURE — 80053 COMPREHEN METABOLIC PANEL: CPT

## 2019-08-21 PROCEDURE — 85025 COMPLETE CBC W/AUTO DIFF WBC: CPT

## 2019-08-21 PROCEDURE — 80061 LIPID PANEL: CPT

## 2019-08-21 PROCEDURE — 74177 CT ABD & PELVIS W/CONTRAST: CPT | Performed by: NURSE PRACTITIONER

## 2019-08-21 PROCEDURE — 83690 ASSAY OF LIPASE: CPT

## 2019-08-21 PROCEDURE — 0DTJ4ZZ RESECTION OF APPENDIX, PERCUTANEOUS ENDOSCOPIC APPROACH: ICD-10-PCS | Performed by: COLON & RECTAL SURGERY

## 2019-08-21 PROCEDURE — 82150 ASSAY OF AMYLASE: CPT

## 2019-08-21 PROCEDURE — 99214 OFFICE O/P EST MOD 30 MIN: CPT | Performed by: NURSE PRACTITIONER

## 2019-08-21 RX ORDER — HYDROMORPHONE HYDROCHLORIDE 1 MG/ML
0.5 INJECTION, SOLUTION INTRAMUSCULAR; INTRAVENOUS; SUBCUTANEOUS EVERY 2 HOUR PRN
Status: DISCONTINUED | OUTPATIENT
Start: 2019-08-21 | End: 2019-08-21 | Stop reason: HOSPADM

## 2019-08-21 RX ORDER — SODIUM CHLORIDE 9 MG/ML
80 INJECTION, SOLUTION INTRAVENOUS CONTINUOUS
Status: DISCONTINUED | OUTPATIENT
Start: 2019-08-21 | End: 2019-08-24

## 2019-08-21 RX ORDER — HYDROMORPHONE HYDROCHLORIDE 1 MG/ML
0.2 INJECTION, SOLUTION INTRAMUSCULAR; INTRAVENOUS; SUBCUTANEOUS EVERY 2 HOUR PRN
Status: DISCONTINUED | OUTPATIENT
Start: 2019-08-21 | End: 2019-08-24

## 2019-08-21 RX ORDER — HYDROMORPHONE HYDROCHLORIDE 1 MG/ML
0.3 INJECTION, SOLUTION INTRAMUSCULAR; INTRAVENOUS; SUBCUTANEOUS EVERY 2 HOUR PRN
Status: DISCONTINUED | OUTPATIENT
Start: 2019-08-21 | End: 2019-08-24

## 2019-08-21 RX ORDER — ACETAMINOPHEN 500 MG
1000 TABLET ORAL EVERY 8 HOURS
Status: DISCONTINUED | OUTPATIENT
Start: 2019-08-21 | End: 2019-08-24

## 2019-08-21 RX ORDER — ONDANSETRON 2 MG/ML
4 INJECTION INTRAMUSCULAR; INTRAVENOUS EVERY 4 HOURS PRN
Status: DISCONTINUED | OUTPATIENT
Start: 2019-08-21 | End: 2019-08-24

## 2019-08-21 RX ORDER — METOCLOPRAMIDE HYDROCHLORIDE 5 MG/ML
10 INJECTION INTRAMUSCULAR; INTRAVENOUS AS NEEDED
Status: DISCONTINUED | OUTPATIENT
Start: 2019-08-21 | End: 2019-08-21 | Stop reason: HOSPADM

## 2019-08-21 RX ORDER — ONDANSETRON 2 MG/ML
4 INJECTION INTRAMUSCULAR; INTRAVENOUS AS NEEDED
Status: DISCONTINUED | OUTPATIENT
Start: 2019-08-21 | End: 2019-08-21 | Stop reason: HOSPADM

## 2019-08-21 RX ORDER — SODIUM CHLORIDE, SODIUM LACTATE, POTASSIUM CHLORIDE, CALCIUM CHLORIDE 600; 310; 30; 20 MG/100ML; MG/100ML; MG/100ML; MG/100ML
INJECTION, SOLUTION INTRAVENOUS CONTINUOUS
Status: DISCONTINUED | OUTPATIENT
Start: 2019-08-21 | End: 2019-08-21 | Stop reason: HOSPADM

## 2019-08-21 RX ORDER — KETOROLAC TROMETHAMINE 15 MG/ML
15 INJECTION, SOLUTION INTRAMUSCULAR; INTRAVENOUS EVERY 6 HOURS
Status: COMPLETED | OUTPATIENT
Start: 2019-08-21 | End: 2019-08-22

## 2019-08-21 RX ORDER — OXYCODONE HYDROCHLORIDE 5 MG/1
5 TABLET ORAL EVERY 4 HOURS PRN
Status: DISCONTINUED | OUTPATIENT
Start: 2019-08-21 | End: 2019-08-24

## 2019-08-21 RX ORDER — HYDROMORPHONE HYDROCHLORIDE 1 MG/ML
1 INJECTION, SOLUTION INTRAMUSCULAR; INTRAVENOUS; SUBCUTANEOUS EVERY 2 HOUR PRN
Status: DISCONTINUED | OUTPATIENT
Start: 2019-08-21 | End: 2019-08-21 | Stop reason: HOSPADM

## 2019-08-21 RX ORDER — CEFOXITIN 1 G/1
INJECTION, POWDER, FOR SOLUTION INTRAVENOUS
Status: DISCONTINUED | OUTPATIENT
Start: 2019-08-21 | End: 2019-08-21 | Stop reason: HOSPADM

## 2019-08-21 RX ORDER — NALOXONE HYDROCHLORIDE 0.4 MG/ML
80 INJECTION, SOLUTION INTRAMUSCULAR; INTRAVENOUS; SUBCUTANEOUS AS NEEDED
Status: DISCONTINUED | OUTPATIENT
Start: 2019-08-21 | End: 2019-08-21 | Stop reason: HOSPADM

## 2019-08-21 RX ORDER — BUPIVACAINE HYDROCHLORIDE AND EPINEPHRINE 2.5; 5 MG/ML; UG/ML
INJECTION, SOLUTION EPIDURAL; INFILTRATION; INTRACAUDAL; PERINEURAL AS NEEDED
Status: DISCONTINUED | OUTPATIENT
Start: 2019-08-21 | End: 2019-08-21 | Stop reason: HOSPADM

## 2019-08-21 RX ORDER — MEPERIDINE HYDROCHLORIDE 25 MG/ML
12.5 INJECTION INTRAMUSCULAR; INTRAVENOUS; SUBCUTANEOUS AS NEEDED
Status: DISCONTINUED | OUTPATIENT
Start: 2019-08-21 | End: 2019-08-21 | Stop reason: HOSPADM

## 2019-08-21 RX ORDER — ONDANSETRON 2 MG/ML
4 INJECTION INTRAMUSCULAR; INTRAVENOUS EVERY 6 HOURS PRN
Status: DISCONTINUED | OUTPATIENT
Start: 2019-08-21 | End: 2019-08-21 | Stop reason: HOSPADM

## 2019-08-21 RX ORDER — OXYCODONE HYDROCHLORIDE 5 MG/1
10 TABLET ORAL EVERY 4 HOURS PRN
Status: DISCONTINUED | OUTPATIENT
Start: 2019-08-21 | End: 2019-08-24

## 2019-08-21 RX ORDER — HEPARIN SODIUM 5000 [USP'U]/ML
5000 INJECTION, SOLUTION INTRAVENOUS; SUBCUTANEOUS EVERY 8 HOURS SCHEDULED
Status: DISCONTINUED | OUTPATIENT
Start: 2019-08-22 | End: 2019-08-24

## 2019-08-21 RX ORDER — LABETALOL HYDROCHLORIDE 5 MG/ML
5 INJECTION, SOLUTION INTRAVENOUS EVERY 5 MIN PRN
Status: DISCONTINUED | OUTPATIENT
Start: 2019-08-21 | End: 2019-08-21 | Stop reason: HOSPADM

## 2019-08-21 RX ORDER — HYDROCHLOROTHIAZIDE 25 MG/1
25 TABLET ORAL DAILY
Status: DISCONTINUED | OUTPATIENT
Start: 2019-08-22 | End: 2019-08-24

## 2019-08-21 RX ORDER — HYDROMORPHONE HYDROCHLORIDE 1 MG/ML
0.4 INJECTION, SOLUTION INTRAMUSCULAR; INTRAVENOUS; SUBCUTANEOUS EVERY 5 MIN PRN
Status: DISCONTINUED | OUTPATIENT
Start: 2019-08-21 | End: 2019-08-21 | Stop reason: HOSPADM

## 2019-08-21 RX ORDER — SODIUM CHLORIDE 9 MG/ML
INJECTION, SOLUTION INTRAVENOUS CONTINUOUS
Status: DISCONTINUED | OUTPATIENT
Start: 2019-08-21 | End: 2019-08-21 | Stop reason: HOSPADM

## 2019-08-21 RX ORDER — HEPARIN SODIUM 5000 [USP'U]/ML
5000 INJECTION, SOLUTION INTRAVENOUS; SUBCUTANEOUS ONCE
Status: DISCONTINUED | OUTPATIENT
Start: 2019-08-21 | End: 2019-08-21 | Stop reason: HOSPADM

## 2019-08-21 RX ORDER — OXYCODONE HYDROCHLORIDE 5 MG/1
2.5 TABLET ORAL EVERY 4 HOURS PRN
Status: DISCONTINUED | OUTPATIENT
Start: 2019-08-21 | End: 2019-08-24

## 2019-08-21 RX ORDER — METOPROLOL SUCCINATE 50 MG/1
50 TABLET, EXTENDED RELEASE ORAL
Status: DISCONTINUED | OUTPATIENT
Start: 2019-08-22 | End: 2019-08-24

## 2019-08-21 RX ORDER — NAPROXEN SODIUM 220 MG
1 TABLET ORAL DAILY
COMMUNITY

## 2019-08-21 RX ORDER — HYDROMORPHONE HYDROCHLORIDE 1 MG/ML
0.4 INJECTION, SOLUTION INTRAMUSCULAR; INTRAVENOUS; SUBCUTANEOUS EVERY 2 HOUR PRN
Status: DISCONTINUED | OUTPATIENT
Start: 2019-08-21 | End: 2019-08-24

## 2019-08-21 NOTE — PLAN OF CARE
Plan for surgery at 1900 this evening. Instructed pt on the incentive spirometer, and pt has scd's in place. Consent signed and paged anesthesia to inquire if he wants pt to have pre op EKG. Awaiting pt going to surgery.

## 2019-08-21 NOTE — ED INITIAL ASSESSMENT (HPI)
Patient here after having (+) CT scan of appendicitis. Patient states he noticed his abdomen hurting Sunday. Vomit x1, (+) diarrhea. Denies fever.

## 2019-08-21 NOTE — H&P
[facility]  H&P    Dlalvin Young Patient Status:  Emergency    1940 MRN [id number]   Location [address] Attending Severo Martinez MD   Hosp Day # 0 PCP Lars Azul MD       History of Present Illness:  Romina Farah 34 years ago. He has never used smokeless tobacco. He reports that he drinks about 1.7 - 2.5 standard drinks of alcohol per week. He reports that he does not use drugs. Allergies:     Ace Inhibitors          Coughing    Medications:    Current Facility-A diaphragms. No secondary use of accessory respiratory musculature. Cardiac: Regular rate and rhythm. No murmur.     Abdomen: Soft, tender to light palpation within the right lower quadrant directly over McBurney's point, slight rebound tenderness, also t techniques were used. Dose information is transmitted to the ACR [employer] of Radiology) NRDR ([address]) which includes the Dose Index Registry.       PATIENT STATED HISTORY:(As transcribed by Technologist)  Patient has had r inflammatory/infectious etiology extending from the terminal ileum towards the appendix. However, possibility this being a primary appendicitis causing adjacent inflammatory changes in   the terminal ileum cannot be excluded.      2. Enlarged prostate glan agree with her physical exam and the data listed in the report, and I have made any relevant changes in editing her note. I have personally examined the patient and reviewed all relevant labs and reports.  I agree with the above assessment and plan and keri

## 2019-08-21 NOTE — PLAN OF CARE
Pt sent to surgery via bed with iv fluids infusing. Pt remains npo for surgery. scd's in place. Consent on chart for pt. Wife at bedside, all belongings sent with pt to surgery.  Gave report to Cm esposito Rn on ortho spine unit as pt will be placed there

## 2019-08-21 NOTE — TELEPHONE ENCOUNTER
Spoke with pt stating started with lower mid abd pain Sunday night. Bloating. Tender upon touch. No radiating pain. No chest pain. No SOB. No dizziness. No Nausea. One episode of vomiting on Sunday- no reoccurrences. Low appetite- liquid diet.  Pushing flui

## 2019-08-21 NOTE — PLAN OF CARE
NURSING ADMISSION NOTE      Patient admitted via cart from ER  Oriented to room. Safety precautions initiated. Bed in low position. Call light in reach. Updated history and pta meds. Obtained consent for surgery with dr. Carina Rouse.  Medicated for pain a

## 2019-08-21 NOTE — ED PROVIDER NOTES
Patient Seen in: [facility] Emergency Department    History   Patient presents with:  Abdomen/Flank Pain (GI/)    Stated Complaint: appendicitis     HPI    26-year-old male presents emergency department who states he is here after having a work-up light extraocular muscles intact no scleral icterus, mucous membranes are moist, there is no erythema or exudate in the posterior pharynx  Neck: Supple no JVD no lymphadenopathy no meningismus no carotid bruit  CV: Regular rate and rhythm no murmur rub  Re Dereje Verma MD              Patient will be given some Zosyn per surgery. Patient does not have any significant medical history so surgery was comfortable admitting him primarily.   Patient will need to go up to his room as he will not be able get h

## 2019-08-21 NOTE — PROGRESS NOTES
Dontae Givens is a 70year old male. Patient presents with:  Abdominal Pain: LG. Room 10. Low mid abdominal pain for 3 days       HPI:   Presents for eval of abd pain. Started Sunday night as intense 10/10 cramping constant pain.   Monday a bit robert SYSTEMS:   GENERAL HEALTH: feels well otherwise  RESPIRATORY: denies shortness of breath with exertion, no cough  CARDIOVASCULAR: denies chest pain on exertion, no palpatations  GI:as abov e  MUSCULOSKELETAL:  No arthralgias or myalgias  NEURO: denies head

## 2019-08-22 LAB
ANION GAP SERPL CALC-SCNC: 7 MMOL/L (ref 0–18)
BASOPHILS # BLD AUTO: 0.02 X10(3) UL (ref 0–0.2)
BASOPHILS NFR BLD AUTO: 0.1 %
BUN BLD-MCNC: 24 MG/DL (ref 7–18)
BUN/CREAT SERPL: 18.9 (ref 10–20)
CALCIUM BLD-MCNC: 8.6 MG/DL (ref 8.5–10.1)
CHLORIDE SERPL-SCNC: 104 MMOL/L (ref 98–112)
CO2 SERPL-SCNC: 26 MMOL/L (ref 21–32)
CREAT BLD-MCNC: 1.27 MG/DL (ref 0.7–1.3)
DEPRECATED RDW RBC AUTO: 42.2 FL (ref 35.1–46.3)
EOSINOPHIL # BLD AUTO: 0 X10(3) UL (ref 0–0.7)
EOSINOPHIL NFR BLD AUTO: 0 %
ERYTHROCYTE [DISTWIDTH] IN BLOOD BY AUTOMATED COUNT: 12.8 % (ref 11–15)
GLUCOSE BLD-MCNC: 151 MG/DL (ref 70–99)
HAV IGM SER QL: 2.2 MG/DL (ref 1.6–2.6)
HCT VFR BLD AUTO: 43.3 % (ref 39–53)
HGB BLD-MCNC: 14.6 G/DL (ref 13–17.5)
IMM GRANULOCYTES # BLD AUTO: 0.12 X10(3) UL (ref 0–1)
IMM GRANULOCYTES NFR BLD: 0.8 %
LYMPHOCYTES # BLD AUTO: 0.44 X10(3) UL (ref 1–4)
LYMPHOCYTES NFR BLD AUTO: 2.9 %
MCH RBC QN AUTO: 30.5 PG (ref 26–34)
MCHC RBC AUTO-ENTMCNC: 33.7 G/DL (ref 31–37)
MCV RBC AUTO: 90.6 FL (ref 80–100)
MONOCYTES # BLD AUTO: 0.57 X10(3) UL (ref 0.1–1)
MONOCYTES NFR BLD AUTO: 3.8 %
NEUTROPHILS # BLD AUTO: 13.9 X10 (3) UL (ref 1.5–7.7)
NEUTROPHILS # BLD AUTO: 13.9 X10(3) UL (ref 1.5–7.7)
NEUTROPHILS NFR BLD AUTO: 92.4 %
OSMOLALITY SERPL CALC.SUM OF ELEC: 291 MOSM/KG (ref 275–295)
PHOSPHATE SERPL-MCNC: 3.6 MG/DL (ref 2.5–4.9)
PLATELET # BLD AUTO: 183 10(3)UL (ref 150–450)
POTASSIUM SERPL-SCNC: 3.8 MMOL/L (ref 3.5–5.1)
RBC # BLD AUTO: 4.78 X10(6)UL (ref 3.8–5.8)
SODIUM SERPL-SCNC: 137 MMOL/L (ref 136–145)
WBC # BLD AUTO: 15.1 X10(3) UL (ref 4–11)

## 2019-08-22 RX ORDER — POTASSIUM CHLORIDE 20 MEQ/1
40 TABLET, EXTENDED RELEASE ORAL ONCE
Status: COMPLETED | OUTPATIENT
Start: 2019-08-22 | End: 2019-08-22

## 2019-08-22 NOTE — OPERATIVE REPORT
[facility]  Operative Note    Dl Hector Location: OR   CSN [id number] MRN [id number]    1940 Age 70year old   Admission Date 2019 Operation Date 2019   Attending Physician Jyoti Wallace MD Operating Physician Lavinia Dumont injury secondary to our entry. Several loops of small intestine were adherent to the anterior abdominal wall. There was moderate amount of murky purulent fluid throughout the right lower quadrant in the pelvis.  Under direct visualization a 5 mm -mm troca was created in the right lower quadrant and through this a [address] [place] round Jennifer Lord drain was brought into the abdomen and placed in the right lower quadrant inflammatory rind and draped down into the pelvis.   This was affixed in place at the skin with 2-0 ny

## 2019-08-22 NOTE — PROGRESS NOTES
RECEIVED PT BACK FROM SURGERY. PT RESTING IN BED, EASY NON LABORED BREATHING ON 02 NC. VS WNL. CPOX IN PLACE. BILATERAL SCD'S IN PLACE. LAP SITES X4 WITH S.S. GAUZE AND TAPE. JPX1. C/D/I. TORRES DRAINING YELLOW CLEAR URINE. PLAN OF CARE DISCUSSED.  ALL QUEST

## 2019-08-22 NOTE — PROGRESS NOTES
[facility]  Progress Note    Alejandra Blanton Patient Status:  Inpatient    1940 MRN [id number]   [facility] 3NW-A Attending Chelsy Huffman MD   Hosp Day # 1 PCP Deborah Fry MD     Subjective:  No new complaints.  Mil peritonitis, without gangrene or abscess     Acute appendicitis with generalized peritonitis, without gangrene or abscess, unspecified whether perforation present     Appendicitis      POD 1 laparoscopic appendectomy for perforated appendicitis  Leukocytos

## 2019-08-22 NOTE — ANESTHESIA POSTPROCEDURE EVALUATION
Ronn Toro Patient Status:  Observation   Age/Gender 70year old male MRN [id number]   [facility] SURGERY Attending Pamla Ahumada, MD   Hosp Day # 0 PCP Phillip Hadley MD       Anesthesia Post-op Note    Pr

## 2019-08-22 NOTE — PLAN OF CARE
Patient alert and oriented x3  Up ad ewelina  Voiding s/p matthew removal  Tolerating CLD; denies N/V  Has not passed gas  Pain controlled w/tylenol and toradol  Incisions to abd are CDI  Drain present w/serosang. Output noted.       Problem: PAIN - ADULT  Goal: caregiver  - Include patient/family/discharge partner in discharge planning  - Arrange for needed discharge resources and transportation as appropriate  - Identify discharge learning needs (meds, wound care, etc)  - Arrange for interpreters to assist at [facility]

## 2019-08-22 NOTE — ANESTHESIA PREPROCEDURE EVALUATION
PRE-OP EVALUATION    Patient Name: Fabiano Soliz    Pre-op Diagnosis: Appendicitis [K37]    Procedure(s):  LAPAROSCOPIC APPENDECTOMY    Surgeon(s) and Role:     * Racheal Akins MD - Primary    Pre-op vitals reviewed.   Temp: 98.1 °F (36.7 °C) Pulmonary    Negative pulmonary ROS.           (-) shortness of breath            Neuro/Psych                                    Past Surgical History:   Procedure Laterality Date   • CATARACT Bilateral 06/2019   • COLONOSCOPY  1/5

## 2019-08-23 LAB
BASOPHILS # BLD AUTO: 0.02 X10(3) UL (ref 0–0.2)
BASOPHILS NFR BLD AUTO: 0.1 %
DEPRECATED RDW RBC AUTO: 42.5 FL (ref 35.1–46.3)
EOSINOPHIL # BLD AUTO: 0.02 X10(3) UL (ref 0–0.7)
EOSINOPHIL NFR BLD AUTO: 0.1 %
ERYTHROCYTE [DISTWIDTH] IN BLOOD BY AUTOMATED COUNT: 13 % (ref 11–15)
HCT VFR BLD AUTO: 44.9 % (ref 39–53)
HGB BLD-MCNC: 15.6 G/DL (ref 13–17.5)
IMM GRANULOCYTES # BLD AUTO: 0.12 X10(3) UL (ref 0–1)
IMM GRANULOCYTES NFR BLD: 0.6 %
LYMPHOCYTES # BLD AUTO: 0.88 X10(3) UL (ref 1–4)
LYMPHOCYTES NFR BLD AUTO: 4.5 %
MCH RBC QN AUTO: 30.7 PG (ref 26–34)
MCHC RBC AUTO-ENTMCNC: 34.7 G/DL (ref 31–37)
MCV RBC AUTO: 88.4 FL (ref 80–100)
MONOCYTES # BLD AUTO: 1.39 X10(3) UL (ref 0.1–1)
MONOCYTES NFR BLD AUTO: 7 %
NEUTROPHILS # BLD AUTO: 17.33 X10 (3) UL (ref 1.5–7.7)
NEUTROPHILS # BLD AUTO: 17.33 X10(3) UL (ref 1.5–7.7)
NEUTROPHILS NFR BLD AUTO: 87.7 %
PLATELET # BLD AUTO: 247 10(3)UL (ref 150–450)
POTASSIUM SERPL-SCNC: 3.4 MMOL/L (ref 3.5–5.1)
POTASSIUM SERPL-SCNC: 3.6 MMOL/L (ref 3.5–5.1)
RBC # BLD AUTO: 5.08 X10(6)UL (ref 3.8–5.8)
WBC # BLD AUTO: 20.3 X10(3) UL (ref 4–11)

## 2019-08-23 RX ORDER — OXYCODONE HYDROCHLORIDE 5 MG/1
5 CAPSULE ORAL EVERY 6 HOURS PRN
Qty: 20 CAPSULE | Refills: 0 | Status: SHIPPED | OUTPATIENT
Start: 2019-08-23 | End: 2019-08-29

## 2019-08-23 RX ORDER — PANTOPRAZOLE SODIUM 40 MG/1
40 TABLET, DELAYED RELEASE ORAL ONCE
Status: COMPLETED | OUTPATIENT
Start: 2019-08-23 | End: 2019-08-23

## 2019-08-23 RX ORDER — POTASSIUM CHLORIDE 20 MEQ/1
40 TABLET, EXTENDED RELEASE ORAL EVERY 4 HOURS
Status: COMPLETED | OUTPATIENT
Start: 2019-08-23 | End: 2019-08-23

## 2019-08-23 RX ORDER — AMOXICILLIN AND CLAVULANATE POTASSIUM 875; 125 MG/1; MG/1
1 TABLET, FILM COATED ORAL 2 TIMES DAILY
Qty: 20 TABLET | Refills: 0 | Status: SHIPPED | OUTPATIENT
Start: 2019-08-23 | End: 2019-09-02

## 2019-08-23 RX ORDER — HYDRALAZINE HYDROCHLORIDE 20 MG/ML
10 INJECTION INTRAMUSCULAR; INTRAVENOUS EVERY 6 HOURS PRN
Status: DISCONTINUED | OUTPATIENT
Start: 2019-08-23 | End: 2019-08-24

## 2019-08-23 RX ORDER — NALOXONE HYDROCHLORIDE 4 MG/.1ML
4 SPRAY, METERED NASAL AS NEEDED
Qty: 1 KIT | Refills: 0 | Status: SHIPPED | OUTPATIENT
Start: 2019-08-23 | End: 2019-08-29

## 2019-08-23 RX ORDER — FAMOTIDINE 20 MG/1
20 TABLET ORAL ONCE
Status: COMPLETED | OUTPATIENT
Start: 2019-08-23 | End: 2019-08-23

## 2019-08-23 NOTE — PROGRESS NOTES
[facility]  Progress Note    Hoang Jacinto Patient Status:  Inpatient    1940 MRN [id number]   [facility] 3NW-A Attending Jalen Owen MD   Hosp Day # 2 PCP Keshawn Brito MD     Subjective:  No new complaints.  Rem appendicitis  CBC pending    Plan:  1. Possible D/C today depending on white count   2. If WBC is still elevated, will need another 24 hrs IV abx  3. If normalized, patient may be d/c'd home  4.  Diet as tolerated - encouraged small frequent meals and suppl

## 2019-08-23 NOTE — PROGRESS NOTES
B/p elevated. Denies HA, CP, dizziness, pain, CARL. Dr Trista Roberson notified. Order received and initiated. Will continue to monitor.

## 2019-08-23 NOTE — PLAN OF CARE
Problem: Patient/Family Goals  Goal: Patient/Family Long Term Goal  Description  Patient's Long Term Goal: Discharge home   Interventions:  - Ambulate   - IS  - Pain managed  - See additional Care Plan goals for specific interventions   Outcome: Progress environment to reduce risk of injury  - Provide assistive devices as appropriate     Outcome: Progressing     Problem: DISCHARGE PLANNING  Goal: Discharge to home or other facility with appropriate resources  Description  INTERVENTIONS:  - Identify barrier

## 2019-08-24 VITALS
RESPIRATION RATE: 18 BRPM | TEMPERATURE: 98 F | SYSTOLIC BLOOD PRESSURE: 169 MMHG | HEART RATE: 94 BPM | DIASTOLIC BLOOD PRESSURE: 74 MMHG | BODY MASS INDEX: 27.16 KG/M2 | OXYGEN SATURATION: 97 % | WEIGHT: 194 LBS | HEIGHT: 71 IN

## 2019-08-24 LAB
ANION GAP SERPL CALC-SCNC: 7 MMOL/L (ref 0–18)
BASOPHILS # BLD AUTO: 0.03 X10(3) UL (ref 0–0.2)
BASOPHILS NFR BLD AUTO: 0.3 %
BUN BLD-MCNC: 22 MG/DL (ref 7–18)
BUN/CREAT SERPL: 20.8 (ref 10–20)
CALCIUM BLD-MCNC: 8.4 MG/DL (ref 8.5–10.1)
CHLORIDE SERPL-SCNC: 108 MMOL/L (ref 98–112)
CO2 SERPL-SCNC: 25 MMOL/L (ref 21–32)
CREAT BLD-MCNC: 1.06 MG/DL (ref 0.7–1.3)
DEPRECATED RDW RBC AUTO: 42.5 FL (ref 35.1–46.3)
EOSINOPHIL # BLD AUTO: 0.28 X10(3) UL (ref 0–0.7)
EOSINOPHIL NFR BLD AUTO: 2.5 %
ERYTHROCYTE [DISTWIDTH] IN BLOOD BY AUTOMATED COUNT: 12.9 % (ref 11–15)
GLUCOSE BLD-MCNC: 102 MG/DL (ref 70–99)
HAV IGM SER QL: 1.9 MG/DL (ref 1.6–2.6)
HCT VFR BLD AUTO: 42.8 % (ref 39–53)
HGB BLD-MCNC: 14.3 G/DL (ref 13–17.5)
IMM GRANULOCYTES # BLD AUTO: 0.07 X10(3) UL (ref 0–1)
IMM GRANULOCYTES NFR BLD: 0.6 %
LYMPHOCYTES # BLD AUTO: 1.35 X10(3) UL (ref 1–4)
LYMPHOCYTES NFR BLD AUTO: 12 %
MCH RBC QN AUTO: 30.2 PG (ref 26–34)
MCHC RBC AUTO-ENTMCNC: 33.4 G/DL (ref 31–37)
MCV RBC AUTO: 90.3 FL (ref 80–100)
MONOCYTES # BLD AUTO: 1.16 X10(3) UL (ref 0.1–1)
MONOCYTES NFR BLD AUTO: 10.3 %
NEUTROPHILS # BLD AUTO: 8.39 X10 (3) UL (ref 1.5–7.7)
NEUTROPHILS # BLD AUTO: 8.39 X10(3) UL (ref 1.5–7.7)
NEUTROPHILS NFR BLD AUTO: 74.3 %
OSMOLALITY SERPL CALC.SUM OF ELEC: 294 MOSM/KG (ref 275–295)
PHOSPHATE SERPL-MCNC: 2.8 MG/DL (ref 2.5–4.9)
PLATELET # BLD AUTO: 262 10(3)UL (ref 150–450)
POTASSIUM SERPL-SCNC: 3.5 MMOL/L (ref 3.5–5.1)
RBC # BLD AUTO: 4.74 X10(6)UL (ref 3.8–5.8)
SODIUM SERPL-SCNC: 140 MMOL/L (ref 136–145)
WBC # BLD AUTO: 11.3 X10(3) UL (ref 4–11)

## 2019-08-24 RX ORDER — POTASSIUM CHLORIDE 20 MEQ/1
40 TABLET, EXTENDED RELEASE ORAL EVERY 4 HOURS
Status: DISCONTINUED | OUTPATIENT
Start: 2019-08-24 | End: 2019-08-24

## 2019-08-24 NOTE — PROGRESS NOTES
Tolerated soft diet, wants to go home. Written and verbal discharge instructions given to patient as well as demonstration of STEVEN drain care with successful teach back,  verbalize understanding.  IV discontinued in LAC, no redness, swelling, or drainage, deshawn

## 2019-08-24 NOTE — PROGRESS NOTES
[facility]  Progress Note    Myles Bynum Patient Status:  Inpatient    1940 MRN [id number]   [facility] 3NW-A Attending Arleen Aly MD   Hosp Day # 3 PCP Enmanuel Lester MD     Subjective:  Patient reports feelin Appendicitis      Postop day 3 laparoscopic appendectomy for perforated appendicitis  Leukocytosis, greatly improved today, patient is afebrile    Plan:  1.  Plan for discharge home today, discharge instructions were discussed with the patient, he should t

## 2019-08-24 NOTE — PLAN OF CARE
Problem: Patient/Family Goals  Goal: Patient/Family Long Term Goal  Description  Patient's Long Term Goal: Discharge home   Interventions:  - Ambulate   - IS  - Pain managed  - See additional Care Plan goals for specific interventions   Outcome: Progress environment to reduce risk of injury  - Provide assistive devices as appropriate     Outcome: Progressing     Problem: DISCHARGE PLANNING  Goal: Discharge to home or other facility with appropriate resources  Description  INTERVENTIONS:  - Identify barrier understanding. Will continue to monitor.

## 2019-08-26 ENCOUNTER — PATIENT MESSAGE (OUTPATIENT)
Dept: CASE MANAGEMENT | Age: 79
End: 2019-08-26

## 2019-08-26 ENCOUNTER — PATIENT OUTREACH (OUTPATIENT)
Dept: CASE MANAGEMENT | Age: 79
End: 2019-08-26

## 2019-08-26 DIAGNOSIS — K35.20 ACUTE APPENDICITIS WITH GENERALIZED PERITONITIS, WITHOUT GANGRENE OR ABSCESS, UNSPECIFIED WHETHER PERFORATION PRESENT: ICD-10-CM

## 2019-08-26 DIAGNOSIS — Z02.9 ENCOUNTERS FOR UNSPECIFIED ADMINISTRATIVE PURPOSE: ICD-10-CM

## 2019-08-26 PROCEDURE — 1111F DSCHRG MED/CURRENT MED MERGE: CPT

## 2019-08-27 ENCOUNTER — TELEPHONE (OUTPATIENT)
Dept: INTERNAL MEDICINE CLINIC | Facility: CLINIC | Age: 79
End: 2019-08-27

## 2019-08-27 NOTE — PROGRESS NOTES
Initial Post Discharge Follow Up   Discharge Date: 8/24/19  Contact Date: 8/26/2019    Consent Verification:  Assessment Completed With: Patient  HIPAA Verified?   Yes    Discharge Dx:    Acute appendicitis with generalized peritonitis, S/P Thais oliveira/ questions about your discharge instructions? No  • Before leaving the hospital was your diagnoses explained to you? Yes  • Do you have any questions about your diagnoses?  No  • Are you able to perform normal daily activities of living as you have prior to No    Referrals/orders at D/C:  Home Health ordered at D/C? No     DME ordered at D/C? No     Services ordered at D/C?   No, not at this time     Needs post D/C:   Now that you are home, are there any needs or concerns you need addressed before your next vi Interventions by NCM: NCM reviewed medications, discharge instructions, S&S of infection/blood clots, patient instructed to report any new or worsening symptoms, when to call the doctor and when to call 911. Patient verbalized understanding of these.  SUZANNE i

## 2019-08-27 NOTE — TELEPHONE ENCOUNTER
Spoke with patient he had surgery at [facility] on 8/21/2019 for appendicitis, he is doing fairly well. Scheduled for HFU on 9/4/2019 with DEVEN. Patient verbalized understanding and agreeable to POC.

## 2019-08-27 NOTE — TELEPHONE ENCOUNTER
Patient discharged from [facility] on 8/24/19 and is at High risk for readmission and recommended to have a TCM HFU by 8/31/19 preferred or no later than 9/7/19 or sooner.  Patient reports he has an appointment on 9/9/19 with Dr. Lala Montes [facility] explain

## 2019-08-29 ENCOUNTER — OFFICE VISIT (OUTPATIENT)
Dept: SURGERY | Facility: CLINIC | Age: 79
End: 2019-08-29

## 2019-08-29 VITALS
WEIGHT: 198 LBS | TEMPERATURE: 99 F | SYSTOLIC BLOOD PRESSURE: 145 MMHG | HEART RATE: 56 BPM | DIASTOLIC BLOOD PRESSURE: 77 MMHG | HEIGHT: 71 IN | BODY MASS INDEX: 27.72 KG/M2

## 2019-08-29 DIAGNOSIS — K35.20 ACUTE APPENDICITIS WITH GENERALIZED PERITONITIS, WITHOUT GANGRENE OR ABSCESS, UNSPECIFIED WHETHER PERFORATION PRESENT: Primary | ICD-10-CM

## 2019-08-29 PROCEDURE — 99024 POSTOP FOLLOW-UP VISIT: CPT | Performed by: PHYSICIAN ASSISTANT

## 2019-08-29 NOTE — PROGRESS NOTES
Post Operative Visit Note       Active Problems  1.  Acute appendicitis with generalized peritonitis, without gangrene or abscess, unspecified whether perforation present         Chief Complaint   Patient presents with:  Post-Op: LAPAROSCOPIC APPENDECTOMY, have been reviewed by me today.     Family History   Problem Relation Age of Onset   • Cancer Sister    • Other (CHF) Mother    • Stroke Father    • Other (CHF) Father    • Other (CAD) Brother    • Lipids Brother         hyperlipidemia     Social History trouble swallowing. Respiratory: Negative for apnea, cough, shortness of breath and wheezing. Cardiovascular: Negative for chest pain, palpitations and leg swelling.    Gastrointestinal: Negative for abdominal distention, abdominal pain, anal bleeding appendicitis and periappendicitis.   -No evidence of malignancy.      Assessment   Acute appendicitis with generalized peritonitis, without gangrene or abscess, unspecified whether perforation present  (primary encounter diagnosis)      Plan   At today's of

## 2019-09-04 ENCOUNTER — OFFICE VISIT (OUTPATIENT)
Dept: INTERNAL MEDICINE CLINIC | Facility: CLINIC | Age: 79
End: 2019-09-04
Payer: MEDICARE

## 2019-09-04 VITALS
WEIGHT: 193 LBS | SYSTOLIC BLOOD PRESSURE: 126 MMHG | HEIGHT: 71 IN | RESPIRATION RATE: 16 BRPM | TEMPERATURE: 98 F | DIASTOLIC BLOOD PRESSURE: 64 MMHG | HEART RATE: 56 BPM | BODY MASS INDEX: 27.02 KG/M2

## 2019-09-04 DIAGNOSIS — I10 ESSENTIAL HYPERTENSION: Primary | ICD-10-CM

## 2019-09-04 DIAGNOSIS — E78.00 PURE HYPERCHOLESTEROLEMIA: ICD-10-CM

## 2019-09-04 DIAGNOSIS — Z12.11 SPECIAL SCREENING FOR MALIGNANT NEOPLASMS, COLON: ICD-10-CM

## 2019-09-04 PROCEDURE — 1111F DSCHRG MED/CURRENT MED MERGE: CPT | Performed by: INTERNAL MEDICINE

## 2019-09-04 PROCEDURE — 99213 OFFICE O/P EST LOW 20 MIN: CPT | Performed by: INTERNAL MEDICINE

## 2019-09-04 RX ORDER — PRAVASTATIN SODIUM 20 MG
20 TABLET ORAL NIGHTLY
Qty: 90 TABLET | Refills: 1 | Status: SHIPPED | OUTPATIENT
Start: 2019-09-04 | End: 2020-07-16

## 2019-09-04 NOTE — PROGRESS NOTES
HPI:    Patient ID: Sarita Miller is a 70year old male. HPI  Here for hospital fu after appy, hyperchol on statin, taking and tolerating pravastatin better than previous statin  Review of Systems   Constitutional: Negative for fever.    RAIT: Peter Marroquin surg    No orders of the defined types were placed in this encounter.       Meds This Visit:  Requested Prescriptions     Signed Prescriptions Disp Refills   • Pravastatin Sodium 20 MG Oral Tab 90 tablet 1     Sig: Take 1 tablet (20 mg total) by mouth night

## 2019-09-13 NOTE — DISCHARGE SUMMARY
[facility]  Discharge Summary    Myles Bynum Patient Status:  Inpatient    1940 MRN [id number]   [facility] 3NW-A Attending No att. providers found   Hosp Day # 3 PCP Enmanuel Lester MD     Date of Admission: 2019    D evaluation.     The patient has had mostly diarrhea since Sunday evening. He states that he has had very limited oral intake and has mostly been liquid.   He denies nausea, vomiting, constipation, fever, or chills.     The patient's past medical history co by mouth daily. , Historical    Naproxen Sodium (ALEVE) 220 MG Oral Tab  Take 1 tablet by mouth daily. , Historical    hydrochlorothiazide 25 MG Oral Tab  Take 1 tablet (25 mg total) by mouth daily. , Normal, Disp-90 tablet, R-3    Metoprolol Succinate ER 50

## 2019-11-18 ENCOUNTER — APPOINTMENT (OUTPATIENT)
Dept: LAB | Facility: HOSPITAL | Age: 79
End: 2019-11-18
Attending: UROLOGY
Payer: MEDICARE

## 2019-11-18 DIAGNOSIS — C61 PROSTATE CANCER (HCC): ICD-10-CM

## 2019-11-18 PROCEDURE — 84153 ASSAY OF PSA TOTAL: CPT

## 2019-11-18 PROCEDURE — 36415 COLL VENOUS BLD VENIPUNCTURE: CPT

## 2019-11-25 ENCOUNTER — HOSPITAL ENCOUNTER (OUTPATIENT)
Dept: MRI IMAGING | Age: 79
Discharge: HOME | End: 2019-11-25
Attending: PHYSICIAN ASSISTANT
Payer: MEDICARE

## 2019-11-25 ENCOUNTER — HOSPITAL ENCOUNTER (OUTPATIENT)
Dept: GENERAL RADIOLOGY | Age: 79
Discharge: HOME | End: 2019-11-25
Attending: PHYSICIAN ASSISTANT
Payer: MEDICARE

## 2019-11-25 DIAGNOSIS — M54.42 CHRONIC BILATERAL LOW BACK PAIN WITH BILATERAL SCIATICA: ICD-10-CM

## 2019-11-25 DIAGNOSIS — M48.061 FORAMINAL STENOSIS OF LUMBAR REGION: ICD-10-CM

## 2019-11-25 DIAGNOSIS — M48.062 SPINAL STENOSIS OF LUMBAR REGION WITH NEUROGENIC CLAUDICATION: ICD-10-CM

## 2019-11-25 DIAGNOSIS — M54.41 CHRONIC BILATERAL LOW BACK PAIN WITH BILATERAL SCIATICA: ICD-10-CM

## 2019-11-25 DIAGNOSIS — M51.36 DDD (DEGENERATIVE DISC DISEASE), LUMBAR: ICD-10-CM

## 2019-11-25 DIAGNOSIS — G89.29 CHRONIC BILATERAL LOW BACK PAIN WITH BILATERAL SCIATICA: ICD-10-CM

## 2019-11-25 DIAGNOSIS — M47.817 FACET ARTHRITIS OF LUMBOSACRAL REGION: ICD-10-CM

## 2019-11-25 DIAGNOSIS — M24.28 LIGAMENTUM FLAVUM HYPERTROPHY: ICD-10-CM

## 2019-11-25 PROCEDURE — 72148 MRI LUMBAR SPINE W/O DYE: CPT | Performed by: PHYSICIAN ASSISTANT

## 2019-11-25 PROCEDURE — 72114 X-RAY EXAM L-S SPINE BENDING: CPT | Performed by: PHYSICIAN ASSISTANT

## 2019-11-27 ENCOUNTER — OFFICE VISIT (OUTPATIENT)
Dept: SURGERY | Facility: CLINIC | Age: 79
End: 2019-11-27
Payer: MEDICARE

## 2019-11-27 ENCOUNTER — OFFICE VISIT (OUTPATIENT)
Dept: PAIN CLINIC | Facility: CLINIC | Age: 79
End: 2019-11-27
Payer: MEDICARE

## 2019-11-27 VITALS
HEART RATE: 60 BPM | OXYGEN SATURATION: 98 % | SYSTOLIC BLOOD PRESSURE: 128 MMHG | TEMPERATURE: 98 F | BODY MASS INDEX: 27 KG/M2 | RESPIRATION RATE: 16 BRPM | HEIGHT: 71 IN | DIASTOLIC BLOOD PRESSURE: 70 MMHG

## 2019-11-27 VITALS
SYSTOLIC BLOOD PRESSURE: 138 MMHG | DIASTOLIC BLOOD PRESSURE: 82 MMHG | BODY MASS INDEX: 27.72 KG/M2 | HEART RATE: 54 BPM | OXYGEN SATURATION: 97 % | HEIGHT: 71 IN | WEIGHT: 198 LBS

## 2019-11-27 DIAGNOSIS — M48.062 SPINAL STENOSIS OF LUMBAR REGION WITH NEUROGENIC CLAUDICATION: ICD-10-CM

## 2019-11-27 DIAGNOSIS — M48.062 SPINAL STENOSIS OF LUMBAR REGION WITH NEUROGENIC CLAUDICATION: Primary | ICD-10-CM

## 2019-11-27 DIAGNOSIS — M54.16 LUMBAR RADICULOPATHY: Primary | ICD-10-CM

## 2019-11-27 PROCEDURE — 99214 OFFICE O/P EST MOD 30 MIN: CPT | Performed by: NURSE PRACTITIONER

## 2019-11-27 PROCEDURE — 99213 OFFICE O/P EST LOW 20 MIN: CPT | Performed by: NEUROLOGICAL SURGERY

## 2019-11-27 NOTE — PROGRESS NOTES
Patient presents in office today with reported pain in bilateral low back, mainly left sided, which intermittently occurs per patient. Patient additionally reports radicular pain downbilateral legs to just below knees, moreso on lef.  Patient reports radicu

## 2019-11-27 NOTE — PROGRESS NOTES
Name: Belgica Bhagat   : 1940   DOS: 2019     Pain Clinic Follow Up Visit:   Belgica Bhagat is a 70year old male who presents for recheck of his chronic low back pain.  Today he is c/o pain to bilateral low back that radiates down the SpO2 97%   BMI 27.62 kg/m²   General: Rolan Morgan IS A 70year old male in no acute distress. Neurologic: Alert, oriented, articulate. Normal gait. Psychiatric: Normal mood. Inspection: Ambulates with well-coordinated, fluid, non-antalgic gait.

## 2019-11-27 NOTE — PROGRESS NOTES
[facility]  Neurosurgery Clinic Visit      HISTORY OF PRESENT ILLNESS:  Aiyana Henning is a(n) 70year old male who returns after imaging to discuss continued back pain and pain down his anterior and posterior thighs with fatigue wh Patient is a 70year old male in no acute distress. NEUROLOGICAL:     Cognition: alert and oriented x 3    Cranial nerves: Pupils equally round and reactive to light. EOMs intact. Face is symmetrical and sensation is intact. Tongue is midline.      [place]on

## 2020-05-06 DIAGNOSIS — I10 ESSENTIAL HYPERTENSION: ICD-10-CM

## 2020-05-06 RX ORDER — METOPROLOL SUCCINATE 50 MG/1
TABLET, EXTENDED RELEASE ORAL
Qty: 90 TABLET | Refills: 3 | Status: SHIPPED | OUTPATIENT
Start: 2020-05-06 | End: 2020-05-06 | Stop reason: CLARIF

## 2020-05-06 RX ORDER — HYDROCHLOROTHIAZIDE 25 MG/1
25 TABLET ORAL DAILY
Qty: 90 TABLET | Refills: 1 | Status: SHIPPED | OUTPATIENT
Start: 2020-05-06 | End: 2020-08-26

## 2020-05-06 RX ORDER — METOPROLOL SUCCINATE 50 MG/1
50 TABLET, EXTENDED RELEASE ORAL DAILY
Qty: 90 TABLET | Refills: 1 | Status: SHIPPED | OUTPATIENT
Start: 2020-05-06 | End: 2020-08-26

## 2020-05-06 RX ORDER — HYDROCHLOROTHIAZIDE 25 MG/1
TABLET ORAL
Qty: 90 TABLET | Refills: 3 | Status: SHIPPED | OUTPATIENT
Start: 2020-05-06 | End: 2020-05-06 | Stop reason: CLARIF

## 2020-05-06 NOTE — TELEPHONE ENCOUNTER
Last Ov:9/4/19  Upcoming appt:none  Last labs:11/18/19 PSA  Last Rx:5/29/19 metoprolol succinate er, hydrochlorothiazide 25mg    Per Protocol sent for review

## 2020-07-16 RX ORDER — PRAVASTATIN SODIUM 20 MG
TABLET ORAL
Qty: 90 TABLET | Refills: 0 | Status: SHIPPED | OUTPATIENT
Start: 2020-07-16 | End: 2020-08-26

## 2020-08-26 DIAGNOSIS — I10 ESSENTIAL HYPERTENSION: ICD-10-CM

## 2020-08-26 RX ORDER — PRAVASTATIN SODIUM 20 MG
20 TABLET ORAL NIGHTLY
Qty: 90 TABLET | Refills: 0 | Status: SHIPPED | OUTPATIENT
Start: 2020-08-26 | End: 2020-12-22

## 2020-08-26 RX ORDER — METOPROLOL SUCCINATE 50 MG/1
50 TABLET, EXTENDED RELEASE ORAL DAILY
Qty: 90 TABLET | Refills: 0 | Status: SHIPPED | OUTPATIENT
Start: 2020-08-26 | End: 2021-05-17

## 2020-08-26 RX ORDER — HYDROCHLOROTHIAZIDE 25 MG/1
25 TABLET ORAL DAILY
Qty: 90 TABLET | Refills: 0 | Status: SHIPPED | OUTPATIENT
Start: 2020-08-26 | End: 2021-05-17

## 2020-08-26 NOTE — TELEPHONE ENCOUNTER
Last Ov:9/4/19  Upcoming appt:none  Last labs:no recent blood work  Last Rx: 7/16/20 pravastatin sodium 20mg 5/6/20 metoprolol succinate ER 50mg, hydrochlorothiazide 25mg    Per Protocol sent for review

## 2020-12-22 RX ORDER — PRAVASTATIN SODIUM 20 MG
TABLET ORAL
Qty: 90 TABLET | Refills: 0 | Status: SHIPPED | OUTPATIENT
Start: 2020-12-22 | End: 2021-03-10

## 2020-12-22 NOTE — TELEPHONE ENCOUNTER
Last OV: 9/4/19 ER f/u    No future appointments. Latest labs: 8/21/19 Lipid    Latest RX: Pravastatin- 90 tabs 0 refills on 8/26/20    Per protocol, failed due to ALT < 80, ALT resulted within past year, Lipid panel within past 12 months and Appointment within past 12 or next 3 months. Rx pending.

## 2021-01-27 ENCOUNTER — TELEPHONE (OUTPATIENT)
Dept: INTERNAL MEDICINE CLINIC | Facility: CLINIC | Age: 81
End: 2021-01-27

## 2021-01-27 DIAGNOSIS — Z00.00 ROUTINE GENERAL MEDICAL EXAMINATION AT A HEALTH CARE FACILITY: ICD-10-CM

## 2021-01-27 DIAGNOSIS — I10 ESSENTIAL HYPERTENSION: ICD-10-CM

## 2021-01-27 DIAGNOSIS — E78.00 PURE HYPERCHOLESTEROLEMIA: Primary | ICD-10-CM

## 2021-01-27 NOTE — TELEPHONE ENCOUNTER
Wellness   Future Appointments   Date Time Provider Emanuel Cony   4/14/2021  1:20 PM Dulce Paulino MD [facility]     Orders to THE [facility] OF [facility] aware must fast no call back required

## 2021-02-26 NOTE — LETTER
[address]  [address]  Consent for Procedure/Sedation  Date: 7/23/25         Time: 0753    I hereby authorize Dr. Schroeder, my physician and his/her assistants (if applicable), which may include medical students, residents, and/or fellows, to perform the following surgical operation/ procedure and administer such anesthesia as may be determined necessary by my physician:  Operation/Procedure name (s)  Cardiac Catheterization, Left Ventricular Cineangiography, Bilateral Selective Coronary Angiography and/or Right Heart Catheterization; possible Percutaneous Transluminal Coronary Angioplasty, Coronary Atherectomy, Coronary Stent, Intracoronary Thrombolytic therapy, Antiplatelet therapy and/or Intravascular Ultrasound on Nish Oneill   2.   I recognize that during the surgical operation/procedure, unforeseen conditions may necessitate additional or different procedures than those listed above.  I, therefore, further authorize and request that the above-named surgeon, assistants, or designees perform such procedures as are, in their judgment, necessary and desirable.    3.   My surgeon/physician has discussed prior to my surgery the potential benefits, risks and side effects of this procedure; the likelihood of achieving goals; and potential problems that might occur during recuperation.  They also discussed reasonable alternatives to the procedure, including risks, benefits, and side effects related to the alternatives and risks related to not receiving this procedure.  I have had all my questions answered and I acknowledge that no guarantee has been made as to the result that may be obtained.    4.   Should the need arise during my operation/procedure, which includes change of level of care prior to discharge, I also consent to the administration of blood and/or blood products.  Further, I understand that despite careful testing and screening of blood or blood products by  collecting agencies, I may still be subject to ill effects as a result of receiving a blood transfusion and/or blood products.  The following are some, but not all, of the potential risks that can occur: fever and allergic reactions, hemolytic reactions, transmission of diseases such as Hepatitis, AIDS and Cytomegalovirus (CMV) and fluid overload.  In the event that I wish to have an autologous transfusion of my own blood, or a directed donor transfusion, I will discuss this with my physician.  Check only if Refusing Blood or Blood Products  I understand refusal of blood or blood products as deemed necessary by my physician may have serious consequences to my condition to include possible death. I hereby assume responsibility for my refusal and release the hospital, its personnel, and my physicians from any responsibility for the consequences of my refusal.          o  Refuse      5.   I authorize the use of any specimen, organs, tissues, body parts or foreign objects that may be removed from my body during the operation/procedure for diagnosis, research or teaching purposes and their subsequent disposal by hospital authorities.  I also authorize the release of specimen test results and/or written reports to my treating physician on the hospital medical staff or other referring or consulting physicians involved in my care, at the discretion of the Pathologist or my treating physician.    6.   I consent to the photographing or videotaping of the operations or procedures to be performed, including appropriate portions of my body for medical, scientific, or educational purposes, provided my identity is not revealed by the pictures or by descriptive texts accompanying them.  If the procedure has been photographed/videotaped, the surgeon will obtain the original picture, image, videotape or CD.  The hospital will not be responsible for storage, release or maintenance of the picture, image, tape or CD.    7.   I consent  to the presence of a  or observers in the operating room as deemed necessary by my physician or their designees.    8.   I recognize that in the event my procedure results in extended X-Ray/fluoroscopy time, I may develop a skin reaction.    9. If I have a Do Not Attempt Resuscitation (DNAR) order in place, that status will be suspended while in the operating room, procedural suite, and during the recovery period unless otherwise explicitly stated by me (or a person authorized to consent on my behalf). The surgeon or my attending physician will determine when the applicable recovery period ends for purposes of reinstating the DNAR order.  10. Patients having a sterilization procedure: I understand that if the procedure is successful the results will be permanent and it will therefore be impossible for me to inseminate, conceive, or bear children.  I also understand that the procedure is intended to result in sterility, although the result has not been guaranteed.   11. I acknowledge that my physician has explained sedation/analgesia administration to me including the risk and benefits I consent to the administration of sedation/analgesia as may be necessary or desirable in the judgment of my physician.    I CERTIFY THAT I HAVE READ AND FULLY UNDERSTAND THE ABOVE CONSENT TO OPERATION and/or OTHER PROCEDURE.      ____________________________________       _________________________________      ______________________________  Signature of Patient         Signature of Responsible Person        Printed Name of Responsible Person   ____________________________________      _________________________________      ______________________________       Signature of Witness          Relationship to Patient                       Date                                       Time  Patient Name: Nish Oneill  : 1940    Reviewed: 2024   Printed: 2025  Medical Record #: [id number] Page 1 of  1            Isotretinoin Pregnancy And Lactation Text: This medication is Pregnancy Category X and is considered extremely dangerous during pregnancy. It is unknown if it is excreted in breast milk.

## 2021-03-01 ENCOUNTER — HOSPITAL ENCOUNTER (OUTPATIENT)
Dept: LAB | Facility: HOSPITAL | Age: 81
Discharge: HOME | End: 2021-03-01
Attending: INTERNAL MEDICINE
Payer: MEDICARE

## 2021-03-01 DIAGNOSIS — I10 ESSENTIAL HYPERTENSION: ICD-10-CM

## 2021-03-01 DIAGNOSIS — Z00.00 ROUTINE GENERAL MEDICAL EXAMINATION AT A HEALTH CARE FACILITY: ICD-10-CM

## 2021-03-01 DIAGNOSIS — E78.00 PURE HYPERCHOLESTEROLEMIA: ICD-10-CM

## 2021-03-01 LAB
ALBUMIN SERPL-MCNC: 3.5 G/DL (ref 3.4–5)
ALBUMIN/GLOB SERPL: 1.1 {RATIO} (ref 1–2)
ALP LIVER SERPL-CCNC: 78 U/L
ALT SERPL-CCNC: 29 U/L
ANION GAP SERPL CALC-SCNC: 5 MMOL/L (ref 0–18)
AST SERPL-CCNC: 38 U/L (ref 15–37)
BASOPHILS # BLD AUTO: 0.01 X10(3) UL (ref 0–0.2)
BASOPHILS NFR BLD AUTO: 0.2 %
BILIRUB SERPL-MCNC: 0.6 MG/DL (ref 0.1–2)
BUN BLD-MCNC: 20 MG/DL (ref 7–18)
BUN/CREAT SERPL: 21.1 (ref 10–20)
CALCIUM BLD-MCNC: 9.1 MG/DL (ref 8.5–10.1)
CHLORIDE SERPL-SCNC: 104 MMOL/L (ref 98–112)
CHOLEST SMN-MCNC: 163 MG/DL (ref ?–200)
CO2 SERPL-SCNC: 31 MMOL/L (ref 21–32)
CREAT BLD-MCNC: 0.95 MG/DL
DEPRECATED RDW RBC AUTO: 41 FL (ref 35.1–46.3)
EOSINOPHIL # BLD AUTO: 0.1 X10(3) UL (ref 0–0.7)
EOSINOPHIL NFR BLD AUTO: 1.9 %
ERYTHROCYTE [DISTWIDTH] IN BLOOD BY AUTOMATED COUNT: 12.6 % (ref 11–15)
GLOBULIN PLAS-MCNC: 3.2 G/DL (ref 2.8–4.4)
GLUCOSE BLD-MCNC: 101 MG/DL (ref 70–99)
HCT VFR BLD AUTO: 47 %
HDLC SERPL-MCNC: 42 MG/DL (ref 40–59)
HGB BLD-MCNC: 16.2 G/DL
IMM GRANULOCYTES # BLD AUTO: 0.02 X10(3) UL (ref 0–1)
IMM GRANULOCYTES NFR BLD: 0.4 %
LDLC SERPL CALC-MCNC: 92 MG/DL (ref ?–100)
LYMPHOCYTES # BLD AUTO: 1.45 X10(3) UL (ref 1–4)
LYMPHOCYTES NFR BLD AUTO: 26.9 %
M PROTEIN MFR SERPL ELPH: 6.7 G/DL (ref 6.4–8.2)
MCH RBC QN AUTO: 30.9 PG (ref 26–34)
MCHC RBC AUTO-ENTMCNC: 34.5 G/DL (ref 31–37)
MCV RBC AUTO: 89.5 FL
MONOCYTES # BLD AUTO: 0.5 X10(3) UL (ref 0.1–1)
MONOCYTES NFR BLD AUTO: 9.3 %
NEUTROPHILS # BLD AUTO: 3.31 X10 (3) UL (ref 1.5–7.7)
NEUTROPHILS # BLD AUTO: 3.31 X10(3) UL (ref 1.5–7.7)
NEUTROPHILS NFR BLD AUTO: 61.3 %
NONHDLC SERPL-MCNC: 121 MG/DL (ref ?–130)
OSMOLALITY SERPL CALC.SUM OF ELEC: 293 MOSM/KG (ref 275–295)
PATIENT FASTING Y/N/NP: YES
PATIENT FASTING Y/N/NP: YES
PLATELET # BLD AUTO: 182 10(3)UL (ref 150–450)
POTASSIUM SERPL-SCNC: 4.1 MMOL/L (ref 3.5–5.1)
RBC # BLD AUTO: 5.25 X10(6)UL
SODIUM SERPL-SCNC: 140 MMOL/L (ref 136–145)
TRIGL SERPL-MCNC: 147 MG/DL (ref 30–149)
VLDLC SERPL CALC-MCNC: 29 MG/DL (ref 0–30)
WBC # BLD AUTO: 5.4 X10(3) UL (ref 4–11)

## 2021-03-01 PROCEDURE — 36415 COLL VENOUS BLD VENIPUNCTURE: CPT | Performed by: INTERNAL MEDICINE

## 2021-03-01 PROCEDURE — 80061 LIPID PANEL: CPT | Performed by: INTERNAL MEDICINE

## 2021-03-01 PROCEDURE — 85025 COMPLETE CBC W/AUTO DIFF WBC: CPT | Performed by: INTERNAL MEDICINE

## 2021-03-01 PROCEDURE — 80053 COMPREHEN METABOLIC PANEL: CPT | Performed by: INTERNAL MEDICINE

## 2021-03-01 NOTE — TELEPHONE ENCOUNTER
PT r/s his wellness   Future Appointments   Date Time Provider Emanuel Cony   3/3/2021  7:20 AM Meghna Coffey MD [facility]     Please place orders for edward

## 2021-03-03 ENCOUNTER — OFFICE VISIT (OUTPATIENT)
Dept: INTERNAL MEDICINE CLINIC | Facility: CLINIC | Age: 81
End: 2021-03-03
Payer: MEDICARE

## 2021-03-03 VITALS
WEIGHT: 194.19 LBS | OXYGEN SATURATION: 97 % | TEMPERATURE: 99 F | BODY MASS INDEX: 27.19 KG/M2 | SYSTOLIC BLOOD PRESSURE: 124 MMHG | HEIGHT: 71 IN | DIASTOLIC BLOOD PRESSURE: 62 MMHG | HEART RATE: 60 BPM

## 2021-03-03 DIAGNOSIS — I10 ESSENTIAL HYPERTENSION: ICD-10-CM

## 2021-03-03 DIAGNOSIS — C61 PROSTATE CANCER (HCC): ICD-10-CM

## 2021-03-03 DIAGNOSIS — Z12.11 SPECIAL SCREENING FOR MALIGNANT NEOPLASMS, COLON: ICD-10-CM

## 2021-03-03 DIAGNOSIS — Z00.00 ROUTINE GENERAL MEDICAL EXAMINATION AT A HEALTH CARE FACILITY: ICD-10-CM

## 2021-03-03 DIAGNOSIS — M47.816 ARTHRITIS OF FACET JOINT OF LUMBAR SPINE: ICD-10-CM

## 2021-03-03 DIAGNOSIS — E78.00 HYPERCHOLESTEROLEMIA: ICD-10-CM

## 2021-03-03 DIAGNOSIS — M48.062 SPINAL STENOSIS OF LUMBAR REGION WITH NEUROGENIC CLAUDICATION: Primary | ICD-10-CM

## 2021-03-03 PROCEDURE — G0439 PPPS, SUBSEQ VISIT: HCPCS | Performed by: INTERNAL MEDICINE

## 2021-03-03 PROCEDURE — 99213 OFFICE O/P EST LOW 20 MIN: CPT | Performed by: INTERNAL MEDICINE

## 2021-03-08 NOTE — PROGRESS NOTES
HPI/Subjective:   Patient ID: Sudha Craft is a 68year old male.     HPI  Here for awv, see form scanned in for detials, no complaints, htn, hyperchol  /62 (BP Location: Left arm, Patient Position: Sitting, Cuff Size: adult)   Pulse 60   Temp 9 Nose normal.   Eyes:      Pupils: Pupils are equal, round, and reactive to light. Cardiovascular:      Rate and Rhythm: Normal rate and regular rhythm. Heart sounds: Normal heart sounds.    Pulmonary:      Effort: Pulmonary effort is normal.      Soni

## 2021-03-10 RX ORDER — PRAVASTATIN SODIUM 20 MG
TABLET ORAL
Qty: 90 TABLET | Refills: 0 | Status: SHIPPED | OUTPATIENT
Start: 2021-03-10 | End: 2021-07-29

## 2021-03-10 NOTE — TELEPHONE ENCOUNTER
Last visit- 03/03/2021    Last refill-  12/22/2020 pravastatin sodium 20mg QTY90 0R    Last labs- 03/01/2021 lipid, cmp, cbc    No future appointments.     Per Protocol- Passed

## 2021-03-12 DIAGNOSIS — Z23 NEED FOR VACCINATION: ICD-10-CM

## 2021-05-17 DIAGNOSIS — I10 ESSENTIAL HYPERTENSION: ICD-10-CM

## 2021-05-17 RX ORDER — HYDROCHLOROTHIAZIDE 25 MG/1
TABLET ORAL
Qty: 90 TABLET | Refills: 1 | Status: SHIPPED | OUTPATIENT
Start: 2021-05-17 | End: 2021-10-20

## 2021-05-17 RX ORDER — METOPROLOL SUCCINATE 50 MG/1
TABLET, EXTENDED RELEASE ORAL
Qty: 90 TABLET | Refills: 1 | Status: SHIPPED | OUTPATIENT
Start: 2021-05-17 | End: 2021-10-20

## 2021-07-29 RX ORDER — PRAVASTATIN SODIUM 20 MG
20 TABLET ORAL NIGHTLY
Qty: 90 TABLET | Refills: 0 | Status: SHIPPED | OUTPATIENT
Start: 2021-07-29 | End: 2021-10-20

## 2021-07-29 NOTE — TELEPHONE ENCOUNTER
Cholesterol Medication Protocol Eiqhyp9607/29/2021 10:16 AM   ALT < 80 Protocol Details    ALT resulted within past year     Lipid panel within past 12 months     Appointment within past 12 or next 3 months      rx approved per protocol.

## 2021-10-18 ENCOUNTER — TELEPHONE (OUTPATIENT)
Dept: INTERNAL MEDICINE CLINIC | Facility: CLINIC | Age: 81
End: 2021-10-18

## 2021-10-18 DIAGNOSIS — I10 ESSENTIAL HYPERTENSION: ICD-10-CM

## 2021-10-18 NOTE — TELEPHONE ENCOUNTER
Prescription Refill Request - Patient advised can take 48-72 hours.     Name of Medication (strength, dose, qty requested:     Medication Quantity Refills Start End   HYDROCHLOROTHIAZIDE 25 MG Oral Tab 90 tablet 1 5/17/2021    Sig:   TAKE 1 TABLET BY MOUTH

## 2021-10-20 RX ORDER — PRAVASTATIN SODIUM 20 MG
20 TABLET ORAL NIGHTLY
Qty: 90 TABLET | Refills: 1 | Status: SHIPPED | OUTPATIENT
Start: 2021-10-20

## 2021-10-20 RX ORDER — HYDROCHLOROTHIAZIDE 25 MG/1
25 TABLET ORAL DAILY
Qty: 90 TABLET | Refills: 1 | Status: SHIPPED | OUTPATIENT
Start: 2021-10-20

## 2021-10-20 RX ORDER — METOPROLOL SUCCINATE 50 MG/1
50 TABLET, EXTENDED RELEASE ORAL DAILY
Qty: 90 TABLET | Refills: 1 | Status: SHIPPED | OUTPATIENT
Start: 2021-10-20

## 2021-10-20 NOTE — TELEPHONE ENCOUNTER
Last visit- 03/03/2021     Last refill- 07/29/2021 pravastatin 20mg QTY90 0R,  05/17/2021 metoprolol succinate er 50mg QTY90 1R,  05/17/2021 hydrochlorothiazide 25mg QTY90 1R    Last labs- 03/01/2021 lipid, cmp, cbc    No future appointments.     Per [employer]

## 2021-12-01 ENCOUNTER — TELEPHONE (OUTPATIENT)
Dept: INTERNAL MEDICINE CLINIC | Facility: CLINIC | Age: 81
End: 2021-12-01

## 2021-12-02 NOTE — TELEPHONE ENCOUNTER
Future Appointments   Date Time Provider Emanuel Donnelly   3/7/2022  9:20 Arjun Huerta MD [facility]     Orders to     THE [facility] OF [facility]        aware must fast no call back required

## 2022-02-22 ENCOUNTER — HOSPITAL ENCOUNTER (OUTPATIENT)
Dept: LAB | Facility: HOSPITAL | Age: 82
Discharge: HOME | End: 2022-02-22
Attending: INTERNAL MEDICINE
Payer: MEDICARE

## 2022-02-22 DIAGNOSIS — I10 ESSENTIAL HYPERTENSION: ICD-10-CM

## 2022-02-22 DIAGNOSIS — C61 PROSTATE CANCER (HCC): ICD-10-CM

## 2022-02-22 DIAGNOSIS — E78.00 PURE HYPERCHOLESTEROLEMIA: ICD-10-CM

## 2022-02-22 LAB
ALBUMIN SERPL-MCNC: 3.6 G/DL (ref 3.4–5)
ALBUMIN/GLOB SERPL: 1.1 {RATIO} (ref 1–2)
ALP LIVER SERPL-CCNC: 79 U/L
ALT SERPL-CCNC: 35 U/L
ANION GAP SERPL CALC-SCNC: 1 MMOL/L (ref 0–18)
AST SERPL-CCNC: 32 U/L (ref 15–37)
BASOPHILS # BLD AUTO: 0.01 X10(3) UL (ref 0–0.2)
BASOPHILS NFR BLD AUTO: 0.2 %
BILIRUB SERPL-MCNC: 0.6 MG/DL (ref 0.1–2)
BUN BLD-MCNC: 18 MG/DL (ref 7–18)
CALCIUM BLD-MCNC: 8.9 MG/DL (ref 8.5–10.1)
CHLORIDE SERPL-SCNC: 108 MMOL/L (ref 98–112)
CHOLEST SERPL-MCNC: 186 MG/DL (ref ?–200)
CO2 SERPL-SCNC: 33 MMOL/L (ref 21–32)
COMPLEXED PSA SERPL-MCNC: 13.6 NG/ML (ref ?–4)
CREAT BLD-MCNC: 0.92 MG/DL
EOSINOPHIL # BLD AUTO: 0.14 X10(3) UL (ref 0–0.7)
EOSINOPHIL NFR BLD AUTO: 2.2 %
ERYTHROCYTE [DISTWIDTH] IN BLOOD BY AUTOMATED COUNT: 12.9 %
FASTING PATIENT LIPID ANSWER: YES
FASTING STATUS PATIENT QL REPORTED: YES
GLOBULIN PLAS-MCNC: 3.4 G/DL (ref 2.8–4.4)
GLUCOSE BLD-MCNC: 109 MG/DL (ref 70–99)
HCT VFR BLD AUTO: 46.7 %
HDLC SERPL-MCNC: 56 MG/DL (ref 40–59)
HGB BLD-MCNC: 16.1 G/DL
IMM GRANULOCYTES # BLD AUTO: 0.01 X10(3) UL (ref 0–1)
IMM GRANULOCYTES NFR BLD: 0.2 %
LDLC SERPL CALC-MCNC: 107 MG/DL (ref ?–100)
LYMPHOCYTES # BLD AUTO: 1.42 X10(3) UL (ref 1–4)
LYMPHOCYTES NFR BLD AUTO: 22.7 %
MCH RBC QN AUTO: 30.8 PG (ref 26–34)
MCHC RBC AUTO-ENTMCNC: 34.5 G/DL (ref 31–37)
MCV RBC AUTO: 89.3 FL
MONOCYTES # BLD AUTO: 0.57 X10(3) UL (ref 0.1–1)
MONOCYTES NFR BLD AUTO: 9.1 %
NEUTROPHILS # BLD AUTO: 4.1 X10 (3) UL (ref 1.5–7.7)
NEUTROPHILS # BLD AUTO: 4.1 X10(3) UL (ref 1.5–7.7)
NEUTROPHILS NFR BLD AUTO: 65.6 %
NONHDLC SERPL-MCNC: 130 MG/DL (ref ?–130)
OSMOLALITY SERPL CALC.SUM OF ELEC: 296 MOSM/KG (ref 275–295)
POTASSIUM SERPL-SCNC: 4.1 MMOL/L (ref 3.5–5.1)
PROT SERPL-MCNC: 7 G/DL (ref 6.4–8.2)
RBC # BLD AUTO: 5.23 X10(6)UL
SODIUM SERPL-SCNC: 142 MMOL/L (ref 136–145)
TRIGL SERPL-MCNC: 129 MG/DL (ref 30–149)
VLDLC SERPL CALC-MCNC: 22 MG/DL (ref 0–30)

## 2022-02-22 PROCEDURE — 36415 COLL VENOUS BLD VENIPUNCTURE: CPT | Performed by: INTERNAL MEDICINE

## 2022-02-22 PROCEDURE — 85025 COMPLETE CBC W/AUTO DIFF WBC: CPT | Performed by: INTERNAL MEDICINE

## 2022-02-22 PROCEDURE — 80053 COMPREHEN METABOLIC PANEL: CPT | Performed by: INTERNAL MEDICINE

## 2022-02-22 PROCEDURE — 80061 LIPID PANEL: CPT | Performed by: INTERNAL MEDICINE

## 2022-03-07 ENCOUNTER — OFFICE VISIT (OUTPATIENT)
Dept: INTERNAL MEDICINE CLINIC | Facility: CLINIC | Age: 82
End: 2022-03-07
Payer: MEDICARE

## 2022-03-07 VITALS
DIASTOLIC BLOOD PRESSURE: 70 MMHG | SYSTOLIC BLOOD PRESSURE: 144 MMHG | HEIGHT: 69 IN | HEART RATE: 80 BPM | RESPIRATION RATE: 12 BRPM | WEIGHT: 192 LBS | OXYGEN SATURATION: 98 % | BODY MASS INDEX: 28.44 KG/M2 | TEMPERATURE: 97 F

## 2022-03-07 DIAGNOSIS — I10 ESSENTIAL HYPERTENSION: ICD-10-CM

## 2022-03-07 DIAGNOSIS — C61 PROSTATE CANCER (HCC): ICD-10-CM

## 2022-03-07 DIAGNOSIS — Z00.00 ROUTINE GENERAL MEDICAL EXAMINATION AT A HEALTH CARE FACILITY: Primary | ICD-10-CM

## 2022-03-07 DIAGNOSIS — M51.36 DDD (DEGENERATIVE DISC DISEASE), LUMBAR: ICD-10-CM

## 2022-03-07 DIAGNOSIS — E78.00 PURE HYPERCHOLESTEROLEMIA: ICD-10-CM

## 2022-03-07 PROCEDURE — G0439 PPPS, SUBSEQ VISIT: HCPCS | Performed by: INTERNAL MEDICINE

## 2022-03-07 RX ORDER — LOSARTAN POTASSIUM 50 MG/1
50 TABLET ORAL DAILY
Qty: 90 TABLET | Refills: 1 | Status: SHIPPED | OUTPATIENT
Start: 2022-03-07

## 2022-03-07 RX ORDER — METOPROLOL SUCCINATE 50 MG/1
50 TABLET, EXTENDED RELEASE ORAL DAILY
Qty: 90 TABLET | Refills: 1 | Status: SHIPPED | OUTPATIENT
Start: 2022-03-07

## 2022-03-07 RX ORDER — HYDROCHLOROTHIAZIDE 25 MG/1
25 TABLET ORAL DAILY
Qty: 90 TABLET | Refills: 1 | Status: SHIPPED | OUTPATIENT
Start: 2022-03-07

## 2022-03-07 RX ORDER — PRAVASTATIN SODIUM 20 MG
20 TABLET ORAL NIGHTLY
Qty: 90 TABLET | Refills: 1 | Status: SHIPPED | OUTPATIENT
Start: 2022-03-07

## 2022-04-08 ENCOUNTER — LAB ENCOUNTER (OUTPATIENT)
Dept: LAB | Age: 82
End: 2022-04-08
Attending: INTERNAL MEDICINE
Payer: MEDICARE

## 2022-04-08 ENCOUNTER — OFFICE VISIT (OUTPATIENT)
Dept: INTERNAL MEDICINE CLINIC | Facility: CLINIC | Age: 82
End: 2022-04-08
Payer: MEDICARE

## 2022-04-08 VITALS
WEIGHT: 194.19 LBS | BODY MASS INDEX: 28.76 KG/M2 | HEART RATE: 57 BPM | RESPIRATION RATE: 18 BRPM | HEIGHT: 69 IN | SYSTOLIC BLOOD PRESSURE: 122 MMHG | DIASTOLIC BLOOD PRESSURE: 82 MMHG

## 2022-04-08 DIAGNOSIS — M54.42 CHRONIC LEFT-SIDED LOW BACK PAIN WITH LEFT-SIDED SCIATICA: ICD-10-CM

## 2022-04-08 DIAGNOSIS — I10 ESSENTIAL HYPERTENSION: ICD-10-CM

## 2022-04-08 DIAGNOSIS — G89.29 CHRONIC LEFT-SIDED LOW BACK PAIN WITH LEFT-SIDED SCIATICA: ICD-10-CM

## 2022-04-08 DIAGNOSIS — Z12.11 SPECIAL SCREENING FOR MALIGNANT NEOPLASMS, COLON: Primary | ICD-10-CM

## 2022-04-08 LAB
ANION GAP SERPL CALC-SCNC: 5 MMOL/L (ref 0–18)
BUN BLD-MCNC: 16 MG/DL (ref 7–18)
CALCIUM BLD-MCNC: 9.4 MG/DL (ref 8.5–10.1)
CHLORIDE SERPL-SCNC: 104 MMOL/L (ref 98–112)
CO2 SERPL-SCNC: 32 MMOL/L (ref 21–32)
CREAT BLD-MCNC: 0.94 MG/DL
FASTING STATUS PATIENT QL REPORTED: NO
GLUCOSE BLD-MCNC: 92 MG/DL (ref 70–99)
OSMOLALITY SERPL CALC.SUM OF ELEC: 293 MOSM/KG (ref 275–295)
POTASSIUM SERPL-SCNC: 4.1 MMOL/L (ref 3.5–5.1)
SODIUM SERPL-SCNC: 141 MMOL/L (ref 136–145)

## 2022-04-08 PROCEDURE — 36415 COLL VENOUS BLD VENIPUNCTURE: CPT

## 2022-04-08 PROCEDURE — 80048 BASIC METABOLIC PNL TOTAL CA: CPT

## 2022-04-08 PROCEDURE — 99213 OFFICE O/P EST LOW 20 MIN: CPT | Performed by: INTERNAL MEDICINE

## 2022-04-08 RX ORDER — LOSARTAN POTASSIUM 50 MG/1
TABLET ORAL DAILY
COMMUNITY
End: 2022-04-08

## 2022-04-08 RX ORDER — LOSARTAN POTASSIUM 50 MG/1
50 TABLET ORAL DAILY
Qty: 90 TABLET | Refills: 1 | Status: SHIPPED | OUTPATIENT
Start: 2022-04-08 | End: 2022-04-08 | Stop reason: CLARIF

## 2022-04-08 RX ORDER — LOSARTAN POTASSIUM 50 MG/1
50 TABLET ORAL DAILY
Qty: 90 TABLET | Refills: 1 | Status: SHIPPED | OUTPATIENT
Start: 2022-04-08

## 2022-04-08 RX ORDER — LOSARTAN POTASSIUM 50 MG/1
50 TABLET ORAL DAILY
Qty: 30 TABLET | Refills: 1 | Status: SHIPPED | OUTPATIENT
Start: 2022-04-08

## 2022-08-25 ENCOUNTER — TELEPHONE (OUTPATIENT)
Dept: INTERNAL MEDICINE CLINIC | Facility: CLINIC | Age: 82
End: 2022-08-25

## 2022-08-25 DIAGNOSIS — I10 ESSENTIAL HYPERTENSION: ICD-10-CM

## 2022-08-25 NOTE — TELEPHONE ENCOUNTER
Future Appointments   Date Time Provider Emanuel Donnelly   9/22/2022 10:20 AM Rohith Lui PA-C [facility]     Paperwork in [employer]

## 2022-08-25 NOTE — TELEPHONE ENCOUNTER
Fax received from [facility] pt is scheduled to have surgery on 10/4/22 -Dr William Herron to schedule Pre op.  Form placed in [facility]'S PSYCHIATRIC [place] folder

## 2022-08-28 RX ORDER — PRAVASTATIN SODIUM 20 MG
20 TABLET ORAL NIGHTLY
Qty: 90 TABLET | Refills: 0 | Status: SHIPPED | OUTPATIENT
Start: 2022-08-28

## 2022-08-28 RX ORDER — HYDROCHLOROTHIAZIDE 25 MG/1
25 TABLET ORAL DAILY
Qty: 90 TABLET | Refills: 0 | Status: SHIPPED | OUTPATIENT
Start: 2022-08-28

## 2022-08-28 RX ORDER — LOSARTAN POTASSIUM 50 MG/1
50 TABLET ORAL DAILY
Qty: 90 TABLET | Refills: 0 | Status: SHIPPED | OUTPATIENT
Start: 2022-08-28

## 2022-08-28 RX ORDER — METOPROLOL SUCCINATE 50 MG/1
50 TABLET, EXTENDED RELEASE ORAL DAILY
Qty: 90 TABLET | Refills: 0 | Status: SHIPPED | OUTPATIENT
Start: 2022-08-28

## 2022-09-22 ENCOUNTER — LAB ENCOUNTER (OUTPATIENT)
Dept: LAB | Age: 82
End: 2022-09-22
Attending: PHYSICIAN ASSISTANT

## 2022-09-22 ENCOUNTER — OFFICE VISIT (OUTPATIENT)
Dept: INTERNAL MEDICINE CLINIC | Facility: CLINIC | Age: 82
End: 2022-09-22

## 2022-09-22 VITALS
WEIGHT: 192 LBS | OXYGEN SATURATION: 98 % | SYSTOLIC BLOOD PRESSURE: 124 MMHG | RESPIRATION RATE: 16 BRPM | BODY MASS INDEX: 28.44 KG/M2 | TEMPERATURE: 98 F | HEART RATE: 62 BPM | DIASTOLIC BLOOD PRESSURE: 66 MMHG | HEIGHT: 69 IN

## 2022-09-22 DIAGNOSIS — I10 ESSENTIAL HYPERTENSION: ICD-10-CM

## 2022-09-22 DIAGNOSIS — C61 PROSTATE CANCER (HCC): ICD-10-CM

## 2022-09-22 DIAGNOSIS — Z01.818 PREOP EXAMINATION: ICD-10-CM

## 2022-09-22 DIAGNOSIS — Z01.818 PREOP EXAMINATION: Primary | ICD-10-CM

## 2022-09-22 LAB
ANION GAP SERPL CALC-SCNC: 6 MMOL/L (ref 0–18)
BUN BLD-MCNC: 20 MG/DL (ref 7–18)
BUN/CREAT SERPL: 21.5 (ref 10–20)
CALCIUM BLD-MCNC: 8.8 MG/DL (ref 8.5–10.1)
CHLORIDE SERPL-SCNC: 105 MMOL/L (ref 98–112)
CO2 SERPL-SCNC: 29 MMOL/L (ref 21–32)
CREAT BLD-MCNC: 0.93 MG/DL
FASTING STATUS PATIENT QL REPORTED: YES
GFR SERPLBLD BASED ON 1.73 SQ M-ARVRAT: 83 ML/MIN/1.73M2 (ref 60–?)
GLUCOSE BLD-MCNC: 94 MG/DL (ref 70–99)
OSMOLALITY SERPL CALC.SUM OF ELEC: 292 MOSM/KG (ref 275–295)
POTASSIUM SERPL-SCNC: 3.9 MMOL/L (ref 3.5–5.1)
SODIUM SERPL-SCNC: 140 MMOL/L (ref 136–145)

## 2022-09-22 PROCEDURE — 80048 BASIC METABOLIC PNL TOTAL CA: CPT

## 2022-09-22 PROCEDURE — 36415 COLL VENOUS BLD VENIPUNCTURE: CPT

## 2022-09-22 RX ORDER — BICALUTAMIDE 50 MG/1
50 TABLET, FILM COATED ORAL DAILY
COMMUNITY
Start: 2022-08-25

## 2022-12-02 ENCOUNTER — OFFICE VISIT (OUTPATIENT)
Dept: PHYSICAL MEDICINE AND REHAB | Facility: CLINIC | Age: 82
End: 2022-12-02
Payer: MEDICARE

## 2022-12-02 ENCOUNTER — HOSPITAL ENCOUNTER (OUTPATIENT)
Dept: GENERAL RADIOLOGY | Age: 82
Discharge: HOME | End: 2022-12-02
Attending: PHYSICAL MEDICINE & REHABILITATION
Payer: MEDICARE

## 2022-12-02 VITALS
DIASTOLIC BLOOD PRESSURE: 70 MMHG | SYSTOLIC BLOOD PRESSURE: 122 MMHG | HEART RATE: 61 BPM | OXYGEN SATURATION: 98 % | WEIGHT: 192 LBS | BODY MASS INDEX: 28.44 KG/M2 | HEIGHT: 69 IN

## 2022-12-02 DIAGNOSIS — M48.062 SPINAL STENOSIS OF LUMBAR REGION WITH NEUROGENIC CLAUDICATION: ICD-10-CM

## 2022-12-02 DIAGNOSIS — M48.062 SPINAL STENOSIS OF LUMBAR REGION WITH NEUROGENIC CLAUDICATION: Primary | ICD-10-CM

## 2022-12-02 PROCEDURE — 1125F AMNT PAIN NOTED PAIN PRSNT: CPT | Performed by: PHYSICAL MEDICINE & REHABILITATION

## 2022-12-02 PROCEDURE — 99204 OFFICE O/P NEW MOD 45 MIN: CPT | Performed by: PHYSICAL MEDICINE & REHABILITATION

## 2022-12-02 PROCEDURE — 72110 X-RAY EXAM L-2 SPINE 4/>VWS: CPT | Performed by: PHYSICAL MEDICINE & REHABILITATION

## 2022-12-02 RX ORDER — ASPIRIN 81 MG/1
81 TABLET ORAL DAILY
COMMUNITY

## 2022-12-02 NOTE — PATIENT INSTRUCTIONS
Start physical therapy working on your core, low back stabilization, home exercises, in a spine neutral/flexion program.   Get an Xray of your low back today, call to schedule an MRI of your low back for updated imaging, we will discuss these results after it is completed. We will determine next steps at the MRI follow up after you have a few sessions of physical therapy completed.

## 2022-12-06 RX ORDER — PRAVASTATIN SODIUM 20 MG
20 TABLET ORAL NIGHTLY
Qty: 90 TABLET | Refills: 0 | Status: SHIPPED | OUTPATIENT
Start: 2022-12-06

## 2022-12-14 ENCOUNTER — HOSPITAL ENCOUNTER (OUTPATIENT)
Dept: MRI IMAGING | Age: 82
Discharge: HOME | End: 2022-12-14
Attending: PHYSICAL MEDICINE & REHABILITATION
Payer: MEDICARE

## 2022-12-14 DIAGNOSIS — M48.062 SPINAL STENOSIS OF LUMBAR REGION WITH NEUROGENIC CLAUDICATION: ICD-10-CM

## 2022-12-14 PROCEDURE — 72148 MRI LUMBAR SPINE W/O DYE: CPT | Performed by: PHYSICAL MEDICINE & REHABILITATION

## 2022-12-16 ENCOUNTER — OFFICE VISIT (OUTPATIENT)
Dept: INTERNAL MEDICINE CLINIC | Facility: CLINIC | Age: 82
End: 2022-12-16
Payer: MEDICARE

## 2022-12-16 ENCOUNTER — TELEPHONE (OUTPATIENT)
Dept: INTERNAL MEDICINE CLINIC | Facility: CLINIC | Age: 82
End: 2022-12-16

## 2022-12-16 VITALS
RESPIRATION RATE: 16 BRPM | WEIGHT: 200.13 LBS | DIASTOLIC BLOOD PRESSURE: 60 MMHG | HEIGHT: 69 IN | OXYGEN SATURATION: 98 % | SYSTOLIC BLOOD PRESSURE: 128 MMHG | BODY MASS INDEX: 29.64 KG/M2 | HEART RATE: 44 BPM | TEMPERATURE: 98 F

## 2022-12-16 DIAGNOSIS — E78.00 HYPERCHOLESTEROLEMIA: ICD-10-CM

## 2022-12-16 DIAGNOSIS — I10 ESSENTIAL HYPERTENSION: ICD-10-CM

## 2022-12-16 DIAGNOSIS — E78.00 PURE HYPERCHOLESTEROLEMIA: ICD-10-CM

## 2022-12-16 DIAGNOSIS — I10 ESSENTIAL HYPERTENSION: Primary | ICD-10-CM

## 2022-12-16 DIAGNOSIS — G89.29 CHRONIC LEFT-SIDED LOW BACK PAIN WITH LEFT-SIDED SCIATICA: Primary | ICD-10-CM

## 2022-12-16 DIAGNOSIS — M54.42 CHRONIC LEFT-SIDED LOW BACK PAIN WITH LEFT-SIDED SCIATICA: Primary | ICD-10-CM

## 2022-12-16 DIAGNOSIS — Z12.11 SPECIAL SCREENING FOR MALIGNANT NEOPLASMS, COLON: ICD-10-CM

## 2022-12-16 PROCEDURE — G0008 ADMIN INFLUENZA VIRUS VAC: HCPCS | Performed by: INTERNAL MEDICINE

## 2022-12-16 PROCEDURE — 99214 OFFICE O/P EST MOD 30 MIN: CPT | Performed by: INTERNAL MEDICINE

## 2022-12-16 PROCEDURE — 90662 IIV NO PRSV INCREASED AG IM: CPT | Performed by: INTERNAL MEDICINE

## 2022-12-16 NOTE — TELEPHONE ENCOUNTER
Future Appointments   Date Time Provider Emanuel Donnelly   4/14/2023 11:20 AM Tiara Lujan MD [facility]     Informed must fast no call back required.  Orders to THE [facility]

## 2022-12-17 DIAGNOSIS — I10 ESSENTIAL HYPERTENSION: ICD-10-CM

## 2022-12-21 RX ORDER — PRAVASTATIN SODIUM 20 MG
TABLET ORAL
Qty: 90 TABLET | Refills: 1 | Status: SHIPPED | OUTPATIENT
Start: 2022-12-21

## 2022-12-21 RX ORDER — METOPROLOL SUCCINATE 50 MG/1
TABLET, EXTENDED RELEASE ORAL
Qty: 90 TABLET | Refills: 1 | Status: SHIPPED | OUTPATIENT
Start: 2022-12-21

## 2022-12-21 RX ORDER — LOSARTAN POTASSIUM 50 MG/1
TABLET ORAL
Qty: 90 TABLET | Refills: 1 | Status: SHIPPED | OUTPATIENT
Start: 2022-12-21

## 2022-12-21 RX ORDER — HYDROCHLOROTHIAZIDE 25 MG/1
TABLET ORAL
Qty: 90 TABLET | Refills: 1 | Status: SHIPPED | OUTPATIENT
Start: 2022-12-21

## 2022-12-28 ENCOUNTER — TELEPHONE (OUTPATIENT)
Dept: PHYSICAL THERAPY | Facility: HOSPITAL | Age: 82
End: 2022-12-28

## 2022-12-29 ENCOUNTER — OFFICE VISIT (OUTPATIENT)
Dept: PHYSICAL THERAPY | Age: 82
End: 2022-12-29
Attending: PHYSICAL MEDICINE & REHABILITATION
Payer: MEDICARE

## 2022-12-29 DIAGNOSIS — M48.062 SPINAL STENOSIS OF LUMBAR REGION WITH NEUROGENIC CLAUDICATION: ICD-10-CM

## 2022-12-29 PROCEDURE — 97110 THERAPEUTIC EXERCISES: CPT

## 2022-12-29 PROCEDURE — 97161 PT EVAL LOW COMPLEX 20 MIN: CPT

## 2023-01-04 ENCOUNTER — OFFICE VISIT (OUTPATIENT)
Dept: PHYSICAL THERAPY | Age: 83
End: 2023-01-04
Attending: PHYSICAL MEDICINE & REHABILITATION
Payer: MEDICARE

## 2023-01-04 PROCEDURE — 97140 MANUAL THERAPY 1/> REGIONS: CPT

## 2023-01-04 PROCEDURE — 97110 THERAPEUTIC EXERCISES: CPT

## 2023-01-09 ENCOUNTER — OFFICE VISIT (OUTPATIENT)
Dept: PHYSICAL THERAPY | Age: 83
End: 2023-01-09
Attending: PHYSICAL MEDICINE & REHABILITATION
Payer: MEDICARE

## 2023-01-09 PROCEDURE — 97140 MANUAL THERAPY 1/> REGIONS: CPT

## 2023-01-09 PROCEDURE — 97110 THERAPEUTIC EXERCISES: CPT

## 2023-01-11 ENCOUNTER — OFFICE VISIT (OUTPATIENT)
Dept: PHYSICAL THERAPY | Age: 83
End: 2023-01-11
Attending: PHYSICAL MEDICINE & REHABILITATION
Payer: MEDICARE

## 2023-01-11 PROCEDURE — 97110 THERAPEUTIC EXERCISES: CPT

## 2023-01-11 PROCEDURE — 97140 MANUAL THERAPY 1/> REGIONS: CPT

## 2023-01-17 ENCOUNTER — OFFICE VISIT (OUTPATIENT)
Dept: PHYSICAL THERAPY | Age: 83
End: 2023-01-17
Attending: PHYSICAL MEDICINE & REHABILITATION
Payer: MEDICARE

## 2023-01-17 PROCEDURE — 97140 MANUAL THERAPY 1/> REGIONS: CPT

## 2023-01-17 PROCEDURE — 97110 THERAPEUTIC EXERCISES: CPT

## 2023-01-20 ENCOUNTER — OFFICE VISIT (OUTPATIENT)
Dept: PHYSICAL THERAPY | Age: 83
End: 2023-01-20
Attending: PHYSICAL MEDICINE & REHABILITATION
Payer: MEDICARE

## 2023-01-20 PROCEDURE — 97140 MANUAL THERAPY 1/> REGIONS: CPT

## 2023-01-20 PROCEDURE — 97110 THERAPEUTIC EXERCISES: CPT

## 2023-01-24 ENCOUNTER — OFFICE VISIT (OUTPATIENT)
Dept: PHYSICAL THERAPY | Age: 83
End: 2023-01-24
Attending: PHYSICAL MEDICINE & REHABILITATION
Payer: MEDICARE

## 2023-01-24 PROCEDURE — 97110 THERAPEUTIC EXERCISES: CPT

## 2023-01-24 PROCEDURE — 97140 MANUAL THERAPY 1/> REGIONS: CPT

## 2023-02-01 ENCOUNTER — OFFICE VISIT (OUTPATIENT)
Dept: PHYSICAL THERAPY | Age: 83
End: 2023-02-01
Attending: PHYSICAL MEDICINE & REHABILITATION
Payer: MEDICARE

## 2023-02-01 PROCEDURE — 97140 MANUAL THERAPY 1/> REGIONS: CPT

## 2023-02-01 PROCEDURE — 97110 THERAPEUTIC EXERCISES: CPT

## 2023-02-08 ENCOUNTER — OFFICE VISIT (OUTPATIENT)
Dept: PHYSICAL THERAPY | Age: 83
End: 2023-02-08
Attending: PHYSICAL MEDICINE & REHABILITATION
Payer: MEDICARE

## 2023-02-08 PROCEDURE — 97110 THERAPEUTIC EXERCISES: CPT

## 2023-02-08 PROCEDURE — 97140 MANUAL THERAPY 1/> REGIONS: CPT

## 2023-02-15 ENCOUNTER — OFFICE VISIT (OUTPATIENT)
Dept: PHYSICAL THERAPY | Age: 83
End: 2023-02-15
Attending: PHYSICAL MEDICINE & REHABILITATION
Payer: MEDICARE

## 2023-02-15 PROCEDURE — 97110 THERAPEUTIC EXERCISES: CPT

## 2023-02-15 PROCEDURE — 97140 MANUAL THERAPY 1/> REGIONS: CPT

## 2023-03-02 ENCOUNTER — OFFICE VISIT (OUTPATIENT)
Dept: PHYSICAL MEDICINE AND REHAB | Facility: CLINIC | Age: 83
End: 2023-03-02
Payer: MEDICARE

## 2023-03-02 ENCOUNTER — TELEPHONE (OUTPATIENT)
Dept: PHYSICAL MEDICINE AND REHAB | Facility: CLINIC | Age: 83
End: 2023-03-02

## 2023-03-02 VITALS
HEART RATE: 63 BPM | SYSTOLIC BLOOD PRESSURE: 120 MMHG | BODY MASS INDEX: 29.62 KG/M2 | OXYGEN SATURATION: 99 % | HEIGHT: 69 IN | WEIGHT: 200 LBS | DIASTOLIC BLOOD PRESSURE: 80 MMHG

## 2023-03-02 DIAGNOSIS — M48.062 LUMBAR STENOSIS WITH NEUROGENIC CLAUDICATION: Primary | ICD-10-CM

## 2023-03-02 DIAGNOSIS — M54.16 LUMBAR RADICULOPATHY: ICD-10-CM

## 2023-03-02 PROCEDURE — 99214 OFFICE O/P EST MOD 30 MIN: CPT | Performed by: PHYSICAL MEDICINE & REHABILITATION

## 2023-03-02 PROCEDURE — 1126F AMNT PAIN NOTED NONE PRSNT: CPT | Performed by: PHYSICAL MEDICINE & REHABILITATION

## 2023-03-02 RX ORDER — GABAPENTIN 300 MG/1
300 CAPSULE ORAL NIGHTLY
Qty: 30 CAPSULE | Refills: 1 | Status: SHIPPED | OUTPATIENT
Start: 2023-03-02

## 2023-03-02 NOTE — PATIENT INSTRUCTIONS
Continue with your exercises at home. Start gabapentin at night as we discussed. We will call to schedule you for the injection into your back with xray guidance. (This is an interlaminar epidural steroid injection)   We will follow up 2 weeks after the injection for a progress update. At that time we will determine if discussion with the surgical services is warranted.

## 2023-03-02 NOTE — PROGRESS NOTES
RETURN PATIENT VISIT    CHIEF COMPLAINT  Low back pain and left lower extremity radiculopathy    INTERVAL HISTORY  Nely Morirs is a [de-identified]year old who was last seen in clinic on 12/2/2022. At that visit patient had MRI ordered which is reviewed in full below, additionally he was started in physical therapy working on lumbar spine stabilization. Patient states that his pain is not as severe however he is still significantly limited from his low back pain and bilateral lower extremity radicular complaints. He describes claudication. Upon ambulating he is able to walk approximately 40 to 50 yards before his legs feel weak and go numb. Upon sitting sensation returns and he is able to continue ambulation. Therapy has helped his tolerance slightly however he still continues to be significantly limited. He feels muscle spasms have improved generally with therapy he is able to stand a bit longer. He has no pain at rest however upon walking he rates his pain 6/10. He denies bowel or bladder dysfunction, denies progressive weakness or progressive numbness. He also describes a left lower extremity radiculopathy emanating from the lumbar spine traveling down the L5 dermatome which also bothers him with activity. REVIEW OF SYSTEMS  Review of systems was completed with the patient today as pertinent to today's visit    PHYSICAL EXAMINATION  CONSTITUTIONAL: Well-appearing, in no apparent distress  EYES: No scleral icterus or conjunctival hemorrhage  CARDIOVASCULAR: Skin warm and well-perfused, no peripheral edema  RESPIRATORY: Breathing unlabored without accessory muscle use  PSYCHIATRIC: Alert, cooperative, appropriate mood and affect  SKIN: No lesions or rashes on exposed skin  MUSCULOSKELETAL: Decent range of motion of lumbar spine, hip and sacroiliac joint provocative maneuvers are negative. Very minimal tenderness over the lumbar paraspinal musculature.   NEUROLOGIC: Slump sit positive on the left, otherwise 5/5 strength bilateral lower extremities at today's manual muscle testing. Sensation grossly intact light touch. Reflexes are symmetric. REVIEW OF PRIOR X-RAYS/STUDIES  I reviewed the MRI of the lumbar spine dated 12/14/2022 my dependent review of these images reveal significant pathology in the mid to low part of lumbar spine from L2 L5-S1. There is broad-based disc bulging at all levels. There is mild to moderate bilateral neuroforaminal stenosis at L2-3. At L3-4 there is severe spinal canal stenosis and severe bilateral neuroforaminal stenosis. At L4-5 there is marked severe central canal stenosis which obliterates the thecal sac with marked severe left neuroforaminal stenosis. At L5-S1 the changes are less significant with mild spinal canal stenosis. Stable from prior imaging. IMPRESSION/DIAGNOSIS  Lumbar stenosis with neurogenic claudication  (primary encounter diagnosis)  Lumbar radiculopathy      TREATMENT/PLAN  Briseida Wilhelm discussion was had with the patient about his significant pathology in the lumbar spine. We discussed the limitations of directed spinal intervention and how his significant spinal canal stenosis may not be amenable to injection therapy. Surgical consultation may be required in this patient however he would like to pursue all conservative measures prior to speaking with the surgeon. He does have a reassuring neurologic examination so this is reasonable. We will initiate the patient on a neuropathic agent gabapentin 300 mg at night. Additionally we will schedule the patient for an L5-S1 interlaminar epidural steroid injection with fluoroscopic anesthesia local anesthesia. Depending on his response to this injection we will continue with conservative treatments versus obtaining a surgical consultation as decompression of the spine would likely benefit this patient. Education was provided regarding the above impression/diagnosis and treatment options/plan were discussed.   All questions were answered during today's visit. Patient will contact clinic if any other questions or concerns.     Brooke Sewell DO  Physical Medicine and Rehabilitation / [address]

## 2023-03-02 NOTE — TELEPHONE ENCOUNTER
Per Medicare Guidelines -no authorization is required for L5/S1 interlaminar epidural steroid injection, fluoroscopic guidance CPT 73003    Status: Authorization is not required however may be subject to review once claim is submitted-Covered Benefit

## 2023-03-03 NOTE — TELEPHONE ENCOUNTER
Patient has been scheduled for  L5/S1 interlaminar epidural steroid injection on 03/13/23 at the [address] [place] [address] with .   -Anesthesia type: LOCAL. -If scheduling [facility] covid testing required for all procedures whether patient is vaccinated or not. -Patient informed not to eat or drink anything after midnight the night prior to the procedure, if being sedated. (Patient informed to fast 12 hours prior to procedure with IVCS/MAC. )  -Patient was advised that if he/she does receive the covid vaccine it needs to be at least 2 weeks before or after the injection. -Medications and allergies reviewed. -Patient reminded to hold NSAIDs (Ibuprofen, ASA 81, Aleve, Naproxen, Mobic, Diclofenac, Etodolac, Celebrex etc.) for 3 days prior to [place]  if BMI is greater than 35. For Cervical injections only hold multivitamins, Vitamin E, Fish Oil, Phentermine (Lomaira) for 7 days prior to injection and NSAIDS.  mg to be held for 7 days prior to injections.  -If patient is receiving MAC/IVCS Phentermine Francois [place]) will need to be held for 7 days prior to injection.  -If on blood thinner clearance has been received to hold this medication by provider.   -Patient informed he/she will need a  to and from procedure. -[facility] is located in the [facility] 1st floor. Patient may park in the yellow or purple parking. Patient verbalized understanding and agrees with plan.  -----> Scheduled in Epic: Yes  -----> Scheduled in [employer]:  Yes

## 2023-03-13 ENCOUNTER — OFFICE VISIT (OUTPATIENT)
Dept: SURGERY | Facility: CLINIC | Age: 83
End: 2023-03-13
Payer: MEDICARE

## 2023-03-13 DIAGNOSIS — M48.062 SPINAL STENOSIS OF LUMBAR REGION WITH NEUROGENIC CLAUDICATION: Primary | ICD-10-CM

## 2023-03-13 NOTE — PROCEDURES
[facility]    LUMBAR INTERLAMINAR  NAME:  Nik Hancock    MR #:    [id number] :  1940     PHYSICIAN:  Nitesh Mcqueen DO        Operative Report    DATE OF PROCEDURE: 3/13/2023   PREOPERATIVE DIAGNOSES: 1. Spinal stenosis of lumbar region with neurogenic claudication        POSTOPERATIVE DIAGNOSES:   1. Spinal stenosis of lumbar region with neurogenic claudication        PROCEDURES: L5-S1 interlaminar epidural steroid injection done under fluoroscopic guidance with contrast enhancement. SURGEON: Nitesh Mcqueen DO   ANESTHESIA: Local   INDICATIONS:       OPERATIVE PROCEDURE:  Written consent was obtained from the patient. The patient was brought into the operating room and placed in the prone position on the fluoroscopy table with pillow underneath the chest and pelvis. The patient's skin was cleaned and draped in a normal sterile fashion. Using AP fluoroscopy, all 5 lumbar vertebrae were identified. The L5-S1 interlaminar space was identified. Skin was anesthetized with 1% PF lidocaine without epinephrine. Then, a 3-1/2 inch, 18-gauge Tuohy needle was inserted and directed towards the paracentral left L5-S1 interlaminar space under AP and contralateral oblique views. Using loss-of-resistance technique the epidural space was entered. Then, Omnipaque-240 contrast was used to obtain a good epidurogram indicating correct needle placement. Then, aspiration was performed. No blood, fluid, or air was aspirated. Then, the patient was injected with a 4 cc solution of 2 cc of 1% PF lidocaine without epinephrine, and 2 cc of 6 mg/cc of Celestone Soluspan. Then, the needle was removed. The patient's skin was cleaned. A Band-Aid was applied. The patient was transferred to the cart and into [facility]. The patient was given discharge instructions and will follow up in the clinic as scheduled.   Throughout the whole procedure, the patient's pulse oximetry and vital signs were monitored and they remained completely stable. Also, throughout the whole procedure, prior to injection of any medication, aspiration was performed. No blood, fluid, or air was aspirated at anytime.         Brooke Sewell DO  Physical Medicine and Rehabilitation / [address] [facility]

## 2023-03-27 ENCOUNTER — OFFICE VISIT (OUTPATIENT)
Dept: PHYSICAL MEDICINE AND REHAB | Facility: CLINIC | Age: 83
End: 2023-03-27
Payer: MEDICARE

## 2023-03-27 VITALS
WEIGHT: 200 LBS | HEIGHT: 69 IN | SYSTOLIC BLOOD PRESSURE: 110 MMHG | BODY MASS INDEX: 29.62 KG/M2 | DIASTOLIC BLOOD PRESSURE: 54 MMHG

## 2023-03-27 DIAGNOSIS — M47.816 LUMBAR SPONDYLOSIS: ICD-10-CM

## 2023-03-27 DIAGNOSIS — M48.062 LUMBAR STENOSIS WITH NEUROGENIC CLAUDICATION: ICD-10-CM

## 2023-03-27 DIAGNOSIS — M48.062 SPINAL STENOSIS OF LUMBAR REGION WITH NEUROGENIC CLAUDICATION: ICD-10-CM

## 2023-03-27 DIAGNOSIS — M54.16 LUMBAR RADICULOPATHY: Primary | ICD-10-CM

## 2023-03-27 PROCEDURE — 99214 OFFICE O/P EST MOD 30 MIN: CPT | Performed by: PHYSICAL MEDICINE & REHABILITATION

## 2023-03-27 NOTE — PATIENT INSTRUCTIONS
Continue with the PT exercises, working on your core and low back, as well as generalized physical conditioning as we discussed. See me back a in about 6 weeks (or sooner if needed) at that time we may discuss address in the joints in the low back (if low back pain remains the biggest factor) versus discussion of repeating the injection done previously.

## 2023-03-27 NOTE — PROGRESS NOTES
RETURN PATIENT VISIT    CHIEF COMPLAINT  Spinal stenosis with neurogenic claudication    INTERVAL HISTORY  Michael Gutierrez is a [de-identified]year old who was last seen in clinic on 3/13/2023, at that visit at the surgical center patient underwent an L5-S1 interlaminar epidural steroid injection with fluoroscopic guidance. Patient states that overall he is feeling pretty well. He states the numbness in his left lower extremity is gone he is now able to walk 50% longer without any low back pain or numbness. He is no longer requiring as many breaks. He is able to enjoy physical activity more. He is returning to the gym with light exercise and endorses some deconditioning but is not limited by his low back or lower extremity complaints. Overall he is very pleased with his progress thus far. He still has left-sided low back pain which was present prior to the injection and remains in this area however does not significantly limiting him. REVIEW OF SYSTEMS  Review of systems was completed with the patient today as pertinent to today's visit    PHYSICAL EXAMINATION  CONSTITUTIONAL: Well-appearing, in no apparent distress  EYES: No scleral icterus or conjunctival hemorrhage  CARDIOVASCULAR: Skin warm and well-perfused, no peripheral edema  RESPIRATORY: Breathing unlabored without accessory muscle use  PSYCHIATRIC: Alert, cooperative, appropriate mood and affect  SKIN: No lesions or rashes on exposed skin  MUSCULOSKELETAL: Decent range of motion of lumbar spine without significant exacerbation in his complaints. At rest he is 0 out of 10 pain. No significant tenderness to palpation over the lumbar paraspinal musculature or gluteal muscles. NEUROLOGIC: 5/5 strength bilateral lower extremities, sensation grossly tact light touch. Reflexes are intact and symmetric. Slump sit negative today.     REVIEW OF PRIOR X-RAYS/STUDIES  I again reviewed the MRI of the lumbar spine dated 12/14/2022, images reveal significant pathology in the mid to low part of lumbar spine from L2 L5-S1. There is broad-based disc bulging at all levels. There is mild to moderate bilateral neuroforaminal stenosis at L2-3. At L3-4 there is severe spinal canal stenosis and severe bilateral neuroforaminal stenosis. At L4-5 there is marked severe central canal stenosis which obliterates the thecal sac with marked severe left neuroforaminal stenosis. At L5-S1 the changes are less significant with mild spinal canal stenosis. Stable from prior imaging. Additionally facet synovitis at L4-5 is significantly increased on T2 weighted imaging. Other levels. IMPRESSION/DIAGNOSIS  Lumbar radiculopathy  (primary encounter diagnosis)  Spinal stenosis of lumbar region with neurogenic claudication  Lumbar stenosis with neurogenic claudication      TREATMENT/PLAN  Patient has had a great response to lumbar interlaminar epidural steroid injection. He will continue with his home exercises working on core and low back as well as generalized physical conditioning at the gym. He will see me back in about 6 weeks or sooner if needed, at that time we may discuss repeat interlaminar epidural steroid injection versus discussion of facet intervention to address the bilateral L4-5 and bilateral L5-S1 facet joints. We discussed both of these options at length today, we discussed facet steroid injections as well as MBB/RFA, I would likely start with facet steroid injections targeting the bilateral L4-5 facets at follow-up depending on his condition. Education was provided regarding the above impression/diagnosis and treatment options/plan were discussed. All questions were answered during today's visit. Patient will contact clinic if any other questions or concerns.     Jennifer Must DO  Physical Medicine and Rehabilitation / [facility]

## 2023-04-14 ENCOUNTER — OFFICE VISIT (OUTPATIENT)
Dept: INTERNAL MEDICINE CLINIC | Facility: CLINIC | Age: 83
End: 2023-04-14
Payer: MEDICARE

## 2023-04-14 VITALS
RESPIRATION RATE: 18 BRPM | WEIGHT: 201.19 LBS | SYSTOLIC BLOOD PRESSURE: 120 MMHG | HEART RATE: 58 BPM | HEIGHT: 69 IN | OXYGEN SATURATION: 97 % | DIASTOLIC BLOOD PRESSURE: 58 MMHG | BODY MASS INDEX: 29.8 KG/M2

## 2023-04-14 DIAGNOSIS — R00.1 BRADYCARDIA: ICD-10-CM

## 2023-04-14 DIAGNOSIS — Z00.00 ROUTINE GENERAL MEDICAL EXAMINATION AT A HEALTH CARE FACILITY: Primary | ICD-10-CM

## 2023-04-14 DIAGNOSIS — C61 PROSTATE CANCER (HCC): ICD-10-CM

## 2023-04-14 DIAGNOSIS — E78.00 PURE HYPERCHOLESTEROLEMIA: ICD-10-CM

## 2023-04-14 DIAGNOSIS — I10 ESSENTIAL HYPERTENSION: ICD-10-CM

## 2023-04-14 PROBLEM — K35.209 ACUTE APPENDICITIS WITH GENERALIZED PERITONITIS, WITHOUT GANGRENE OR ABSCESS: Status: RESOLVED | Noted: 2019-08-21 | Resolved: 2023-04-14

## 2023-04-14 PROBLEM — K35.20 ACUTE APPENDICITIS WITH GENERALIZED PERITONITIS, WITHOUT GANGRENE OR ABSCESS: Status: RESOLVED | Noted: 2019-08-21 | Resolved: 2023-04-14

## 2023-04-14 PROBLEM — K35.209 ACUTE APPENDICITIS WITH GENERALIZED PERITONITIS, WITHOUT GANGRENE OR ABSCESS, UNSPECIFIED WHETHER PERFORATION PRESENT: Status: RESOLVED | Noted: 2019-08-21 | Resolved: 2023-04-14

## 2023-04-14 PROBLEM — K35.20 ACUTE APPENDICITIS WITH GENERALIZED PERITONITIS, WITHOUT GANGRENE OR ABSCESS, UNSPECIFIED WHETHER PERFORATION PRESENT: Status: RESOLVED | Noted: 2019-08-21 | Resolved: 2023-04-14

## 2023-04-14 PROBLEM — K37 APPENDICITIS: Status: RESOLVED | Noted: 2019-08-21 | Resolved: 2023-04-14

## 2023-04-14 LAB
ATRIAL RATE: 64 BPM
P AXIS: 24 DEGREES
P-R INTERVAL: 148 MS
Q-T INTERVAL: 414 MS
QRS DURATION: 90 MS
QTC CALCULATION (BEZET): 427 MS
R AXIS: -15 DEGREES
T AXIS: 49 DEGREES
VENTRICULAR RATE: 64 BPM

## 2023-04-14 PROCEDURE — G0439 PPPS, SUBSEQ VISIT: HCPCS | Performed by: INTERNAL MEDICINE

## 2023-04-14 PROCEDURE — 1125F AMNT PAIN NOTED PAIN PRSNT: CPT | Performed by: INTERNAL MEDICINE

## 2023-04-14 PROCEDURE — 93000 ELECTROCARDIOGRAM COMPLETE: CPT | Performed by: INTERNAL MEDICINE

## 2023-04-14 PROCEDURE — 99214 OFFICE O/P EST MOD 30 MIN: CPT | Performed by: INTERNAL MEDICINE

## 2023-04-14 RX ORDER — METOPROLOL SUCCINATE 25 MG/1
25 TABLET, EXTENDED RELEASE ORAL DAILY
Qty: 90 TABLET | Refills: 0 | Status: SHIPPED | OUTPATIENT
Start: 2023-04-14 | End: 2023-04-19

## 2023-04-17 ENCOUNTER — LAB ENCOUNTER (OUTPATIENT)
Dept: LAB | Age: 83
End: 2023-04-17
Attending: INTERNAL MEDICINE
Payer: MEDICARE

## 2023-04-17 DIAGNOSIS — E78.00 HYPERCHOLESTEROLEMIA: ICD-10-CM

## 2023-04-17 DIAGNOSIS — E78.00 PURE HYPERCHOLESTEROLEMIA: ICD-10-CM

## 2023-04-17 DIAGNOSIS — R00.1 BRADYCARDIA: ICD-10-CM

## 2023-04-17 DIAGNOSIS — I10 ESSENTIAL HYPERTENSION: ICD-10-CM

## 2023-04-17 LAB
ALBUMIN SERPL-MCNC: 3.6 G/DL (ref 3.4–5)
ALBUMIN/GLOB SERPL: 1.1 {RATIO} (ref 1–2)
ALP LIVER SERPL-CCNC: 67 U/L
ALT SERPL-CCNC: 31 U/L
ANION GAP SERPL CALC-SCNC: 1 MMOL/L (ref 0–18)
AST SERPL-CCNC: 29 U/L (ref 15–37)
BASOPHILS # BLD AUTO: 0.02 X10(3) UL (ref 0–0.2)
BASOPHILS NFR BLD AUTO: 0.3 %
BILIRUB SERPL-MCNC: 0.9 MG/DL (ref 0.1–2)
BUN BLD-MCNC: 20 MG/DL (ref 7–18)
CALCIUM BLD-MCNC: 9.2 MG/DL (ref 8.5–10.1)
CHLORIDE SERPL-SCNC: 108 MMOL/L (ref 98–112)
CHOLEST SERPL-MCNC: 177 MG/DL (ref ?–200)
CO2 SERPL-SCNC: 29 MMOL/L (ref 21–32)
CREAT BLD-MCNC: 1.13 MG/DL
EOSINOPHIL # BLD AUTO: 0.12 X10(3) UL (ref 0–0.7)
EOSINOPHIL NFR BLD AUTO: 2 %
ERYTHROCYTE [DISTWIDTH] IN BLOOD BY AUTOMATED COUNT: 13.2 %
FASTING PATIENT LIPID ANSWER: YES
FASTING STATUS PATIENT QL REPORTED: YES
GFR SERPLBLD BASED ON 1.73 SQ M-ARVRAT: 66 ML/MIN/1.73M2 (ref 60–?)
GLOBULIN PLAS-MCNC: 3.2 G/DL (ref 2.8–4.4)
GLUCOSE BLD-MCNC: 105 MG/DL (ref 70–99)
HCT VFR BLD AUTO: 46.3 %
HDLC SERPL-MCNC: 47 MG/DL (ref 40–59)
HGB BLD-MCNC: 15.5 G/DL
IMM GRANULOCYTES # BLD AUTO: 0.02 X10(3) UL (ref 0–1)
IMM GRANULOCYTES NFR BLD: 0.3 %
LDLC SERPL CALC-MCNC: 108 MG/DL (ref ?–100)
LYMPHOCYTES # BLD AUTO: 1.23 X10(3) UL (ref 1–4)
LYMPHOCYTES NFR BLD AUTO: 20.6 %
MCH RBC QN AUTO: 30.3 PG (ref 26–34)
MCHC RBC AUTO-ENTMCNC: 33.5 G/DL (ref 31–37)
MCV RBC AUTO: 90.4 FL
MONOCYTES # BLD AUTO: 0.49 X10(3) UL (ref 0.1–1)
MONOCYTES NFR BLD AUTO: 8.2 %
NEUTROPHILS # BLD AUTO: 4.1 X10 (3) UL (ref 1.5–7.7)
NEUTROPHILS # BLD AUTO: 4.1 X10(3) UL (ref 1.5–7.7)
NEUTROPHILS NFR BLD AUTO: 68.6 %
NONHDLC SERPL-MCNC: 130 MG/DL (ref ?–130)
OSMOLALITY SERPL CALC.SUM OF ELEC: 289 MOSM/KG (ref 275–295)
PLATELET # BLD AUTO: 183 10(3)UL (ref 150–450)
POTASSIUM SERPL-SCNC: 4.3 MMOL/L (ref 3.5–5.1)
PROT SERPL-MCNC: 6.8 G/DL (ref 6.4–8.2)
RBC # BLD AUTO: 5.12 X10(6)UL
SODIUM SERPL-SCNC: 138 MMOL/L (ref 136–145)
TRIGL SERPL-MCNC: 125 MG/DL (ref 30–149)
TSI SER-ACNC: 1.59 MIU/ML (ref 0.36–3.74)
VLDLC SERPL CALC-MCNC: 21 MG/DL (ref 0–30)
WBC # BLD AUTO: 6 X10(3) UL (ref 4–11)

## 2023-04-17 PROCEDURE — 84443 ASSAY THYROID STIM HORMONE: CPT

## 2023-04-17 PROCEDURE — 36415 COLL VENOUS BLD VENIPUNCTURE: CPT

## 2023-04-17 PROCEDURE — 80061 LIPID PANEL: CPT

## 2023-04-17 PROCEDURE — 85025 COMPLETE CBC W/AUTO DIFF WBC: CPT

## 2023-04-17 PROCEDURE — 80053 COMPREHEN METABOLIC PANEL: CPT

## 2023-04-19 ENCOUNTER — OFFICE VISIT (OUTPATIENT)
Dept: INTERNAL MEDICINE CLINIC | Facility: CLINIC | Age: 83
End: 2023-04-19
Payer: MEDICARE

## 2023-04-19 VITALS
WEIGHT: 199.81 LBS | DIASTOLIC BLOOD PRESSURE: 60 MMHG | SYSTOLIC BLOOD PRESSURE: 128 MMHG | BODY MASS INDEX: 29.59 KG/M2 | HEIGHT: 69 IN | RESPIRATION RATE: 16 BRPM | HEART RATE: 75 BPM | TEMPERATURE: 98 F | OXYGEN SATURATION: 98 %

## 2023-04-19 DIAGNOSIS — R00.1 BRADYCARDIA: Primary | ICD-10-CM

## 2023-04-19 DIAGNOSIS — I10 ESSENTIAL HYPERTENSION: ICD-10-CM

## 2023-04-19 RX ORDER — LOSARTAN POTASSIUM 50 MG/1
50 TABLET ORAL DAILY
Qty: 90 TABLET | Refills: 1 | Status: SHIPPED | OUTPATIENT
Start: 2023-04-19

## 2023-04-19 RX ORDER — HYDROCHLOROTHIAZIDE 25 MG/1
25 TABLET ORAL DAILY
Qty: 90 TABLET | Refills: 1 | Status: SHIPPED | OUTPATIENT
Start: 2023-04-19

## 2023-04-19 RX ORDER — PRAVASTATIN SODIUM 20 MG
20 TABLET ORAL NIGHTLY
Qty: 90 TABLET | Refills: 1 | Status: SHIPPED | OUTPATIENT
Start: 2023-04-19

## 2023-04-24 LAB
ATRIAL RATE: 57 BPM
P AXIS: 49 DEGREES
P-R INTERVAL: 160 MS
Q-T INTERVAL: 426 MS
QRS DURATION: 90 MS
QTC CALCULATION (BEZET): 414 MS
R AXIS: -18 DEGREES
T AXIS: 26 DEGREES
VENTRICULAR RATE: 57 BPM

## 2023-04-26 ENCOUNTER — HOSPITAL ENCOUNTER (OUTPATIENT)
Dept: CV DIAGNOSTICS | Facility: HOSPITAL | Age: 83
Discharge: HOME | End: 2023-04-26
Attending: INTERNAL MEDICINE
Payer: MEDICARE

## 2023-04-26 DIAGNOSIS — R00.1 BRADYCARDIA: ICD-10-CM

## 2023-04-26 PROCEDURE — 93226 XTRNL ECG REC<48 HR SCAN A/R: CPT | Performed by: INTERNAL MEDICINE

## 2023-04-26 PROCEDURE — 93225 XTRNL ECG REC<48 HRS REC: CPT | Performed by: INTERNAL MEDICINE

## 2023-05-11 ENCOUNTER — OFFICE VISIT (OUTPATIENT)
Dept: PHYSICAL MEDICINE AND REHAB | Facility: CLINIC | Age: 83
End: 2023-05-11
Payer: MEDICARE

## 2023-05-11 VITALS
DIASTOLIC BLOOD PRESSURE: 70 MMHG | HEART RATE: 62 BPM | WEIGHT: 199 LBS | SYSTOLIC BLOOD PRESSURE: 142 MMHG | BODY MASS INDEX: 29 KG/M2 | OXYGEN SATURATION: 99 %

## 2023-05-11 DIAGNOSIS — M48.062 SPINAL STENOSIS OF LUMBAR REGION WITH NEUROGENIC CLAUDICATION: Primary | ICD-10-CM

## 2023-05-11 DIAGNOSIS — M48.062 LUMBAR STENOSIS WITH NEUROGENIC CLAUDICATION: ICD-10-CM

## 2023-05-11 DIAGNOSIS — M47.816 LUMBAR SPONDYLOSIS: ICD-10-CM

## 2023-05-11 DIAGNOSIS — M54.16 LUMBAR RADICULOPATHY: ICD-10-CM

## 2023-05-11 PROCEDURE — 1125F AMNT PAIN NOTED PAIN PRSNT: CPT | Performed by: PHYSICAL MEDICINE & REHABILITATION

## 2023-05-11 PROCEDURE — 99214 OFFICE O/P EST MOD 30 MIN: CPT | Performed by: PHYSICAL MEDICINE & REHABILITATION

## 2023-05-11 RX ORDER — CYCLOBENZAPRINE HCL 10 MG
10 TABLET ORAL NIGHTLY
Qty: 30 TABLET | Refills: 0 | Status: SHIPPED | OUTPATIENT
Start: 2023-05-11

## 2023-05-11 NOTE — PATIENT INSTRUCTIONS
Get started with muscle relaxer at night as we discussed, this medication can be broken in half if needed. Depending on your response to this we can consider trigger point injections to the lumbar paraspinal muscles as we discussed today. Let me know how you are doing on this medication in about 1-2 weeks and we will make that decision on the injections.

## 2023-12-12 NOTE — LETTER
05/08/18        Lannis Roots  [address] [facility] [address] [address]      Dear Yasmin Shook,    [facility] records indicate that you have outstanding lab work and or testing that was ordered for you and has not yet been completed:          Comp Metabolic Panel (1 None

## 2024-01-09 DIAGNOSIS — I10 ESSENTIAL HYPERTENSION: ICD-10-CM

## 2024-01-09 RX ORDER — LOSARTAN POTASSIUM 50 MG/1
50 TABLET ORAL DAILY
Qty: 90 TABLET | Refills: 1 | Status: SHIPPED | OUTPATIENT
Start: 2024-01-09

## 2024-01-09 RX ORDER — PRAVASTATIN SODIUM 20 MG
20 TABLET ORAL NIGHTLY
Qty: 90 TABLET | Refills: 1 | Status: SHIPPED | OUTPATIENT
Start: 2024-01-09

## 2024-01-09 RX ORDER — HYDROCHLOROTHIAZIDE 25 MG/1
25 TABLET ORAL DAILY
Qty: 90 TABLET | Refills: 1 | Status: SHIPPED | OUTPATIENT
Start: 2024-01-09

## 2024-01-09 NOTE — TELEPHONE ENCOUNTER
Hypertension Medications Protocol [id number]/09/2024 09:52 AM   Protocol Details Appointment in past 6 or next 3 months    CMP or BMP in past 12 months    Last serum creatinine< 2.0        Requested Prescriptions     Pending Prescriptions Disp Refills    HYDROCHLOROTHIAZIDE 25 MG Oral Tab [Pharmacy Med Name: HYDROCHLOROTHIAZIDE TABS 25MG] 90 tablet 0     Sig: TAKE 1 TABLET DAILY    LOSARTAN 50 MG Oral Tab [Pharmacy Med Name: LOSARTAN TABS 50MG] 90 tablet 0     Sig: TAKE 1 TABLET DAILY    PRAVASTATIN 20 MG Oral Tab [Pharmacy Med Name: PRAVASTATIN TABS 20MG] 90 tablet 0     Sig: TAKE 1 TABLET NIGHTLY       LOV:  4--JL- Bradycardia    LAST CPE: 4---JL-Physical    Last Labs: 4--lipid,cmp,cbc,tsh    Last Refill: 4--90 tabs with 1 refills for all

## 2024-01-16 NOTE — TELEPHONE ENCOUNTER
Future Appointments   Date Time Provider Department Center   4/22/2024 11:00 AM Sean Valdez MD [facility]

## 2024-04-16 ENCOUNTER — LAB ENCOUNTER (OUTPATIENT)
Dept: LAB | Age: 84
End: 2024-04-16
Attending: INTERNAL MEDICINE
Payer: MEDICARE

## 2024-04-16 DIAGNOSIS — Z00.00 ROUTINE GENERAL MEDICAL EXAMINATION AT A HEALTH CARE FACILITY: ICD-10-CM

## 2024-04-16 DIAGNOSIS — I10 ESSENTIAL HYPERTENSION: ICD-10-CM

## 2024-04-16 LAB
ALBUMIN SERPL-MCNC: 3.6 G/DL (ref 3.4–5)
ALBUMIN/GLOB SERPL: 1.1 {RATIO} (ref 1–2)
ALP LIVER SERPL-CCNC: 81 U/L
ALT SERPL-CCNC: 32 U/L
ANION GAP SERPL CALC-SCNC: 5 MMOL/L (ref 0–18)
AST SERPL-CCNC: 28 U/L (ref 15–37)
BASOPHILS # BLD AUTO: 0.01 X10(3) UL (ref 0–0.2)
BASOPHILS NFR BLD AUTO: 0.2 %
BILIRUB SERPL-MCNC: 0.6 MG/DL (ref 0.1–2)
BUN BLD-MCNC: 22 MG/DL (ref 9–23)
CALCIUM BLD-MCNC: 9.2 MG/DL (ref 8.5–10.1)
CHLORIDE SERPL-SCNC: 108 MMOL/L (ref 98–112)
CHOLEST SERPL-MCNC: 170 MG/DL (ref ?–200)
CO2 SERPL-SCNC: 29 MMOL/L (ref 21–32)
CREAT BLD-MCNC: 1.15 MG/DL
EGFRCR SERPLBLD CKD-EPI 2021: 64 ML/MIN/1.73M2 (ref 60–?)
EOSINOPHIL # BLD AUTO: 0.08 X10(3) UL (ref 0–0.7)
EOSINOPHIL NFR BLD AUTO: 1.3 %
ERYTHROCYTE [DISTWIDTH] IN BLOOD BY AUTOMATED COUNT: 13.3 %
FASTING PATIENT LIPID ANSWER: YES
FASTING STATUS PATIENT QL REPORTED: YES
GLOBULIN PLAS-MCNC: 3.4 G/DL (ref 2.8–4.4)
GLUCOSE BLD-MCNC: 99 MG/DL (ref 70–99)
HCT VFR BLD AUTO: 45 %
HDLC SERPL-MCNC: 52 MG/DL (ref 40–59)
HGB BLD-MCNC: 15.4 G/DL
IMM GRANULOCYTES # BLD AUTO: 0.02 X10(3) UL (ref 0–1)
IMM GRANULOCYTES NFR BLD: 0.3 %
LDLC SERPL CALC-MCNC: 106 MG/DL (ref ?–100)
LYMPHOCYTES # BLD AUTO: 1.3 X10(3) UL (ref 1–4)
LYMPHOCYTES NFR BLD AUTO: 21.8 %
MCH RBC QN AUTO: 30.7 PG (ref 26–34)
MCHC RBC AUTO-ENTMCNC: 34.2 G/DL (ref 31–37)
MCV RBC AUTO: 89.6 FL
MONOCYTES # BLD AUTO: 0.43 X10(3) UL (ref 0.1–1)
MONOCYTES NFR BLD AUTO: 7.2 %
NEUTROPHILS # BLD AUTO: 4.11 X10 (3) UL (ref 1.5–7.7)
NEUTROPHILS # BLD AUTO: 4.11 X10(3) UL (ref 1.5–7.7)
NEUTROPHILS NFR BLD AUTO: 69.2 %
NONHDLC SERPL-MCNC: 118 MG/DL (ref ?–130)
OSMOLALITY SERPL CALC.SUM OF ELEC: 297 MOSM/KG (ref 275–295)
PLATELET # BLD AUTO: 206 10(3)UL (ref 150–450)
POTASSIUM SERPL-SCNC: 4.2 MMOL/L (ref 3.5–5.1)
PROT SERPL-MCNC: 7 G/DL (ref 6.4–8.2)
RBC # BLD AUTO: 5.02 X10(6)UL
SODIUM SERPL-SCNC: 142 MMOL/L (ref 136–145)
TRIGL SERPL-MCNC: 63 MG/DL (ref 30–149)
VLDLC SERPL CALC-MCNC: 11 MG/DL (ref 0–30)
WBC # BLD AUTO: 6 X10(3) UL (ref 4–11)

## 2024-04-16 PROCEDURE — 85025 COMPLETE CBC W/AUTO DIFF WBC: CPT

## 2024-04-16 PROCEDURE — 36415 COLL VENOUS BLD VENIPUNCTURE: CPT

## 2024-04-16 PROCEDURE — 80053 COMPREHEN METABOLIC PANEL: CPT

## 2024-04-16 PROCEDURE — 80061 LIPID PANEL: CPT

## 2024-04-22 ENCOUNTER — OFFICE VISIT (OUTPATIENT)
Dept: INTERNAL MEDICINE CLINIC | Facility: CLINIC | Age: 84
End: 2024-04-22
Payer: MEDICARE

## 2024-04-22 VITALS
OXYGEN SATURATION: 98 % | HEART RATE: 59 BPM | RESPIRATION RATE: 16 BRPM | HEIGHT: 69 IN | BODY MASS INDEX: 29.33 KG/M2 | DIASTOLIC BLOOD PRESSURE: 78 MMHG | TEMPERATURE: 98 F | WEIGHT: 198 LBS | SYSTOLIC BLOOD PRESSURE: 132 MMHG

## 2024-04-22 DIAGNOSIS — E78.00 PURE HYPERCHOLESTEROLEMIA: ICD-10-CM

## 2024-04-22 DIAGNOSIS — I10 ESSENTIAL HYPERTENSION: ICD-10-CM

## 2024-04-22 DIAGNOSIS — Z00.00 ROUTINE GENERAL MEDICAL EXAMINATION AT A HEALTH CARE FACILITY: Primary | ICD-10-CM

## 2024-04-22 DIAGNOSIS — L98.9 SKIN LESION: ICD-10-CM

## 2024-04-22 DIAGNOSIS — C61 PROSTATE CANCER (HCC): ICD-10-CM

## 2024-04-22 DIAGNOSIS — M79.2 NEURALGIA: ICD-10-CM

## 2024-04-22 DIAGNOSIS — Z86.010 HISTORY OF COLON POLYPS: ICD-10-CM

## 2024-04-22 PROCEDURE — G0439 PPPS, SUBSEQ VISIT: HCPCS | Performed by: INTERNAL MEDICINE

## 2024-04-22 PROCEDURE — 99214 OFFICE O/P EST MOD 30 MIN: CPT | Performed by: INTERNAL MEDICINE

## 2024-04-22 RX ORDER — LOSARTAN POTASSIUM 50 MG/1
50 TABLET ORAL DAILY
Qty: 90 TABLET | Refills: 3 | Status: SHIPPED | OUTPATIENT
Start: 2024-04-22

## 2024-04-22 RX ORDER — PRAVASTATIN SODIUM 20 MG
20 TABLET ORAL NIGHTLY
Qty: 90 TABLET | Refills: 3 | Status: SHIPPED | OUTPATIENT
Start: 2024-04-22

## 2024-04-22 RX ORDER — HYDROCHLOROTHIAZIDE 25 MG/1
25 TABLET ORAL DAILY
Qty: 90 TABLET | Refills: 3 | Status: SHIPPED | OUTPATIENT
Start: 2024-04-22

## 2024-04-24 PROBLEM — C61 PROSTATE CANCER (HCC): Status: ACTIVE | Noted: 2024-04-24

## 2024-04-24 NOTE — PROGRESS NOTES
Subjective:   Patient ID: Nish Oneill is a 83 year old male.    /78   Pulse 59   Temp 97.5 °F (36.4 °C)   Resp 16   Ht 5' 9\" (1.753 m)   Wt 198 lb (89.8 kg)   SpO2 98%   BMI 29.24 kg/m²     HPI  Here for awv, see form filled out for details, follows with urology for prostate cancer, htn, hyperchol, co right greater than left parasthesias radiating into the hand, median nerve distribution, no weakness, mainly at night, 2 years duration  History/Other:   Review of Systems   Constitutional:  Negative for fever.   HENT:  Negative for congestion.    Eyes:  Negative for visual disturbance.   Respiratory:  Negative for shortness of breath.    Cardiovascular:  Negative for chest pain.   Gastrointestinal:  Negative for abdominal pain.   Endocrine: Negative for polyuria.   Genitourinary:  Negative for dysuria.   Musculoskeletal:  Negative for arthralgias.   Skin:  Negative for rash.   Neurological:  Negative for headaches.   Psychiatric/Behavioral:  Negative for confusion.      Current Outpatient Medications   Medication Sig Dispense Refill    hydroCHLOROthiazide 25 MG Oral Tab Take 1 tablet (25 mg total) by mouth daily. 90 tablet 3    losartan 50 MG Oral Tab Take 1 tablet (50 mg total) by mouth daily. 90 tablet 3    pravastatin 20 MG Oral Tab Take 1 tablet (20 mg total) by mouth nightly. 90 tablet 3    cyclobenzaprine 10 MG Oral Tab Take 1 tablet (10 mg total) by mouth nightly. 30 tablet 0    aspirin 81 MG Oral Tab EC Take 1 tablet (81 mg total) by mouth daily.      Multiple Vitamins-Minerals (TAB-A-COLEEN MAXIMUM) Oral Tab Take 1 tablet by mouth daily.       Allergies:  Allergies   Allergen Reactions    Ace Inhibitors Coughing       Objective:   Physical Exam  Constitutional:       Appearance: Normal appearance.   HENT:      Head: Normocephalic and atraumatic.      Right Ear: Tympanic membrane normal.      Left Ear: Tympanic membrane normal.   Eyes:      Pupils: Pupils are equal, round, and reactive to  light.   Cardiovascular:      Rate and Rhythm: Regular rhythm.      Heart sounds: No murmur heard.  Pulmonary:      Breath sounds: Normal breath sounds.   Abdominal:      Palpations: Abdomen is soft.   Genitourinary:     Penis: Normal.    Musculoskeletal:         General: No swelling.      Cervical back: Neck supple.   Skin:     General: Skin is warm.      Comments: 2 irreg skin lesions, pigmented on back   Neurological:      General: No focal deficit present.      Mental Status: He is alert.   Psychiatric:         Mood and Affect: Mood normal.         Assessment & Plan:   1. Routine general medical examination at a health care facility -see awv form filled out for details   2. Pure hypercholesterolemia -cont meds   3. Essential hypertension -cont meds   4. Skin lesion -x2 on back, slightly irreg to derm soon   5. History of colon polyps -fu with GI to discuss if need for fu cscope   6. Neuralgia -discussed writst splints at night, check emg, await results, discussed importance   7. Prostate cancer (HCC) following psa with urology, no complaints   Fu in 6 months    No orders of the defined types were placed in this encounter.      Meds This Visit:  Requested Prescriptions     Signed Prescriptions Disp Refills    hydroCHLOROthiazide 25 MG Oral Tab 90 tablet 3     Sig: Take 1 tablet (25 mg total) by mouth daily.    losartan 50 MG Oral Tab 90 tablet 3     Sig: Take 1 tablet (50 mg total) by mouth daily.    pravastatin 20 MG Oral Tab 90 tablet 3     Sig: Take 1 tablet (20 mg total) by mouth nightly.       Imaging & Referrals:  [facility] - INTERNAL  [facility] - INTERNAL

## 2024-06-08 ENCOUNTER — MED REC SCAN ONLY (OUTPATIENT)
Dept: INTERNAL MEDICINE CLINIC | Facility: CLINIC | Age: 84
End: 2024-06-08

## 2024-10-11 ENCOUNTER — TELEPHONE (OUTPATIENT)
Dept: INTERNAL MEDICINE CLINIC | Facility: CLINIC | Age: 84
End: 2024-10-11

## 2024-10-11 NOTE — TELEPHONE ENCOUNTER
LMTCB.  Per LOV with [facility] is managing pt's PSA.  Urology LOV note mentions PSA check in 6m (May 2024) but order was never placed.  Will get clarification from pt upon return call.

## 2024-10-28 ENCOUNTER — OFFICE VISIT (OUTPATIENT)
Dept: INTERNAL MEDICINE CLINIC | Facility: CLINIC | Age: 84
End: 2024-10-28
Payer: MEDICARE

## 2024-10-28 VITALS
TEMPERATURE: 97 F | BODY MASS INDEX: 28.68 KG/M2 | RESPIRATION RATE: 18 BRPM | HEIGHT: 69 IN | HEART RATE: 60 BPM | SYSTOLIC BLOOD PRESSURE: 124 MMHG | WEIGHT: 193.63 LBS | DIASTOLIC BLOOD PRESSURE: 76 MMHG | OXYGEN SATURATION: 98 %

## 2024-10-28 DIAGNOSIS — E78.00 PURE HYPERCHOLESTEROLEMIA: ICD-10-CM

## 2024-10-28 DIAGNOSIS — I10 ESSENTIAL HYPERTENSION: ICD-10-CM

## 2024-10-28 DIAGNOSIS — Z12.11 SPECIAL SCREENING FOR MALIGNANT NEOPLASMS, COLON: Primary | ICD-10-CM

## 2024-11-01 NOTE — PROGRESS NOTES
Subjective:   Patient ID: Nish Oneill is a 84 year old male.    /76   Pulse 60   Temp 96.8 °F (36 °C) (Temporal)   Resp 18   Ht 5' 9\" (1.753 m)   Wt 193 lb 9.6 oz (87.8 kg)   SpO2 98%   BMI 28.59 kg/m²     HPI  Here for htn and hyperchol, due for colon cancer screening  History/Other:   Review of Systems   Constitutional:  Negative for fever.   HENT:  Negative for congestion.    Eyes:  Negative for visual disturbance.   Respiratory:  Negative for shortness of breath.    Cardiovascular:  Negative for chest pain.   Gastrointestinal:  Negative for abdominal pain.   Endocrine: Negative for polyuria.   Genitourinary:  Negative for dysuria.   Musculoskeletal:  Negative for arthralgias.   Skin:  Negative for rash.   Neurological:  Negative for headaches.   Psychiatric/Behavioral:  Negative for confusion.      Current Outpatient Medications   Medication Sig Dispense Refill    hydroCHLOROthiazide 25 MG Oral Tab Take 1 tablet (25 mg total) by mouth daily. 90 tablet 3    losartan 50 MG Oral Tab Take 1 tablet (50 mg total) by mouth daily. 90 tablet 3    pravastatin 20 MG Oral Tab Take 1 tablet (20 mg total) by mouth nightly. 90 tablet 3    cyclobenzaprine 10 MG Oral Tab Take 1 tablet (10 mg total) by mouth nightly. 30 tablet 0    aspirin 81 MG Oral Tab EC Take 1 tablet (81 mg total) by mouth daily.      Multiple Vitamins-Minerals (TAB-A-COLEEN MAXIMUM) Oral Tab Take 1 tablet by mouth daily.       Allergies:Allergies[1]    Objective:   Physical Exam  Constitutional:       Appearance: Normal appearance.   Cardiovascular:      Rate and Rhythm: Regular rhythm.      Heart sounds: Normal heart sounds.   Pulmonary:      Breath sounds: Normal breath sounds.   Skin:     General: Skin is warm.   Neurological:      Mental Status: He is alert and oriented to person, place, and time.         Assessment & Plan:   1. Special screening for malignant neoplasms, colon -refer to gi   2. Pure hypercholesterolemia -cont meds   3.  Essential hypertension -cont meds   Fu when due for awv    No orders of the defined types were placed in this encounter.      Meds This Visit:  Requested Prescriptions      No prescriptions requested or ordered in this encounter       Imaging & Referrals:  [facility] - INTERNAL         [1]   Allergies  Allergen Reactions    Ace Inhibitors Coughing

## 2024-11-21 DIAGNOSIS — E78.00 HYPERCHOLESTEROLEMIA: ICD-10-CM

## 2024-11-21 DIAGNOSIS — I10 ESSENTIAL HYPERTENSION: Primary | ICD-10-CM

## 2024-11-21 DIAGNOSIS — Z00.00 ROUTINE GENERAL MEDICAL EXAMINATION AT A HEALTH CARE FACILITY: ICD-10-CM

## 2025-03-20 NOTE — TELEPHONE ENCOUNTER
Medication: viagra passed protocol.   Last office visit date: 11/06/2024  Next appointment scheduled?: Yes   Number of refills given: 0    Pt asking if labs are needed for upcoming 6m f/u OV.  If so, pt is asking if PSA can be added to labs as he is not scheduled to see Dr. Howell until next spring and he is curious on his PSA number.  If labs are not needed for this f/u visit pt OK to wait until spring to check PSA with Dr. Howell.    Pt states he had prostate cancer therapy with Dr. Ruelas 10/2022 but is following with Dr. Howell as Dr. Ruelas is no longer with practice.      Dr. Valdez: please advise if labs are needed for 6 month f/u visit.  If so, OK to add PSA lab?

## 2025-03-24 ENCOUNTER — ORDER TRANSCRIPTION (OUTPATIENT)
Dept: ADMINISTRATIVE | Facility: HOSPITAL | Age: 85
End: 2025-03-24

## 2025-03-24 DIAGNOSIS — M79.2 NEURALGIA: Primary | ICD-10-CM

## 2025-03-25 ENCOUNTER — LAB ENCOUNTER (OUTPATIENT)
Dept: LAB | Age: 85
End: 2025-03-25
Attending: INTERNAL MEDICINE
Payer: MEDICARE

## 2025-03-25 DIAGNOSIS — I10 ESSENTIAL HYPERTENSION: ICD-10-CM

## 2025-03-25 DIAGNOSIS — E78.00 HYPERCHOLESTEROLEMIA: ICD-10-CM

## 2025-03-25 DIAGNOSIS — Z00.00 ROUTINE GENERAL MEDICAL EXAMINATION AT A HEALTH CARE FACILITY: ICD-10-CM

## 2025-03-25 LAB
BASOPHILS # BLD AUTO: 0.01 X10(3) UL (ref 0–0.2)
BASOPHILS NFR BLD AUTO: 0.1 %
CHOLEST SERPL-MCNC: 184 MG/DL (ref ?–200)
EOSINOPHIL # BLD AUTO: 0.13 X10(3) UL (ref 0–0.7)
EOSINOPHIL NFR BLD AUTO: 1.8 %
ERYTHROCYTE [DISTWIDTH] IN BLOOD BY AUTOMATED COUNT: 13.2 %
FASTING PATIENT LIPID ANSWER: YES
HCT VFR BLD AUTO: 45.4 %
HDLC SERPL-MCNC: 43 MG/DL (ref 40–59)
HGB BLD-MCNC: 15.2 G/DL
IMM GRANULOCYTES # BLD AUTO: 0.03 X10(3) UL (ref 0–1)
IMM GRANULOCYTES NFR BLD: 0.4 %
LDLC SERPL CALC-MCNC: 117 MG/DL (ref ?–100)
LYMPHOCYTES # BLD AUTO: 1.75 X10(3) UL (ref 1–4)
LYMPHOCYTES NFR BLD AUTO: 24.8 %
MCH RBC QN AUTO: 29.9 PG (ref 26–34)
MCHC RBC AUTO-ENTMCNC: 33.5 G/DL (ref 31–37)
MCV RBC AUTO: 89.2 FL
MONOCYTES # BLD AUTO: 0.57 X10(3) UL (ref 0.1–1)
MONOCYTES NFR BLD AUTO: 8.1 %
NEUTROPHILS # BLD AUTO: 4.56 X10 (3) UL (ref 1.5–7.7)
NEUTROPHILS # BLD AUTO: 4.56 X10(3) UL (ref 1.5–7.7)
NEUTROPHILS NFR BLD AUTO: 64.8 %
NONHDLC SERPL-MCNC: 141 MG/DL (ref ?–130)
PLATELET # BLD AUTO: 192 10(3)UL (ref 150–450)
RBC # BLD AUTO: 5.09 X10(6)UL
TRIGL SERPL-MCNC: 133 MG/DL (ref 30–149)
VLDLC SERPL CALC-MCNC: 23 MG/DL (ref 0–30)
WBC # BLD AUTO: 7.1 X10(3) UL (ref 4–11)

## 2025-03-25 PROCEDURE — 80061 LIPID PANEL: CPT

## 2025-03-25 PROCEDURE — 85025 COMPLETE CBC W/AUTO DIFF WBC: CPT

## 2025-03-25 PROCEDURE — 36415 COLL VENOUS BLD VENIPUNCTURE: CPT

## 2025-04-14 ENCOUNTER — OFFICE VISIT (OUTPATIENT)
Dept: INTERNAL MEDICINE CLINIC | Facility: CLINIC | Age: 85
End: 2025-04-14
Payer: MEDICARE

## 2025-04-14 VITALS
WEIGHT: 197 LBS | SYSTOLIC BLOOD PRESSURE: 146 MMHG | OXYGEN SATURATION: 97 % | TEMPERATURE: 98 F | BODY MASS INDEX: 29.18 KG/M2 | RESPIRATION RATE: 14 BRPM | HEART RATE: 36 BPM | DIASTOLIC BLOOD PRESSURE: 68 MMHG | HEIGHT: 69 IN

## 2025-04-14 DIAGNOSIS — Z00.00 ROUTINE GENERAL MEDICAL EXAMINATION AT A HEALTH CARE FACILITY: Primary | ICD-10-CM

## 2025-04-14 DIAGNOSIS — I10 ESSENTIAL HYPERTENSION: ICD-10-CM

## 2025-04-14 DIAGNOSIS — E78.00 PURE HYPERCHOLESTEROLEMIA: ICD-10-CM

## 2025-04-14 DIAGNOSIS — C61 PROSTATE CANCER (HCC): ICD-10-CM

## 2025-04-14 DIAGNOSIS — M47.812 SPONDYLOSIS OF CERVICAL REGION WITHOUT MYELOPATHY OR RADICULOPATHY: ICD-10-CM

## 2025-04-14 RX ORDER — LOSARTAN POTASSIUM 50 MG/1
50 TABLET ORAL DAILY
Qty: 90 TABLET | Refills: 3 | Status: SHIPPED | OUTPATIENT
Start: 2025-04-14

## 2025-04-14 RX ORDER — AMLODIPINE BESYLATE 5 MG/1
5 TABLET ORAL DAILY
Qty: 90 TABLET | Refills: 3 | Status: SHIPPED | OUTPATIENT
Start: 2025-04-14 | End: 2026-04-09

## 2025-04-14 RX ORDER — HYDROCHLOROTHIAZIDE 25 MG/1
25 TABLET ORAL DAILY
Qty: 90 TABLET | Refills: 3 | Status: SHIPPED | OUTPATIENT
Start: 2025-04-14

## 2025-04-14 RX ORDER — PRAVASTATIN SODIUM 20 MG
20 TABLET ORAL NIGHTLY
Qty: 90 TABLET | Refills: 3 | Status: SHIPPED | OUTPATIENT
Start: 2025-04-14

## 2025-04-14 NOTE — PROGRESS NOTES
Subjective:   Patient ID: Nish Oneill is a 84 year old male.    /68   Pulse (!) 36   Temp 97.8 °F (36.6 °C) (Temporal)   Resp 14   Ht 5' 9\" (1.753 m)   Wt 197 lb (89.4 kg)   SpO2 97%   BMI 29.09 kg/m²     HPI  Here for awv, actively following psa sp brackey therapy, bp high today, discussed, see awv form filled out for details  History/Other:   Review of Systems   Constitutional:  Negative for fever.   HENT:  Negative for congestion.    Eyes:  Negative for visual disturbance.   Respiratory:  Negative for shortness of breath.    Cardiovascular:  Negative for chest pain.   Gastrointestinal:  Negative for abdominal pain.   Endocrine: Negative for polyuria.   Genitourinary:  Negative for dysuria.   Musculoskeletal:  Negative for arthralgias.   Skin:  Negative for rash.   Neurological:  Negative for headaches.   Psychiatric/Behavioral:  Negative for confusion.      Current Medications[1]  Allergies:Allergies[2]    Objective:   Physical Exam  Constitutional:       Appearance: Normal appearance.   HENT:      Head: Normocephalic and atraumatic.      Right Ear: Tympanic membrane normal.      Left Ear: Tympanic membrane normal.   Eyes:      Pupils: Pupils are equal, round, and reactive to light.   Cardiovascular:      Rate and Rhythm: Regular rhythm.      Heart sounds: No murmur heard.  Pulmonary:      Breath sounds: Normal breath sounds.   Abdominal:      Palpations: Abdomen is soft.   Musculoskeletal:         General: No swelling.      Cervical back: Neck supple.   Skin:     General: Skin is warm.   Neurological:      General: No focal deficit present.      Mental Status: He is alert.   Psychiatric:         Mood and Affect: Mood normal.         Assessment & Plan:   1. Routine general medical examination at a health care facility -see awv form filled out for detials   2. Essential hypertension -add amlodipine, fu in 6 weeks, check cmp   3. Pure hypercholesterolemia -cont meds   4. Prostate cancer (HCC)  -fu with Dr. Pastor   5. Spondylosis of cervical region without myelopathy or radiculopathy -seeing Dr. GAGE, EMG scheduled in a few weeks to eval ue symptoms       Orders Placed This Encounter   Procedures    Comp Metabolic Panel (14) [E]       Meds This Visit:  Requested Prescriptions     Signed Prescriptions Disp Refills    hydroCHLOROthiazide 25 MG Oral Tab 90 tablet 3     Sig: Take 1 tablet (25 mg total) by mouth daily.    losartan 50 MG Oral Tab 90 tablet 3     Sig: Take 1 tablet (50 mg total) by mouth daily.    pravastatin 20 MG Oral Tab 90 tablet 3     Sig: Take 1 tablet (20 mg total) by mouth nightly.    amLODIPine 5 MG Oral Tab 90 tablet 3     Sig: Take 1 tablet (5 mg total) by mouth daily.       Imaging & Referrals:  None         [1]   Current Outpatient Medications   Medication Sig Dispense Refill    hydroCHLOROthiazide 25 MG Oral Tab Take 1 tablet (25 mg total) by mouth daily. 90 tablet 3    losartan 50 MG Oral Tab Take 1 tablet (50 mg total) by mouth daily. 90 tablet 3    pravastatin 20 MG Oral Tab Take 1 tablet (20 mg total) by mouth nightly. 90 tablet 3    amLODIPine 5 MG Oral Tab Take 1 tablet (5 mg total) by mouth daily. 90 tablet 3   [2]   Allergies  Allergen Reactions    Ace Inhibitors Coughing

## 2025-04-28 ENCOUNTER — PROCEDURE VISIT (OUTPATIENT)
Dept: PHYSICAL MEDICINE AND REHAB | Facility: CLINIC | Age: 85
End: 2025-04-28
Payer: MEDICARE

## 2025-04-28 DIAGNOSIS — M79.2 NEURALGIA: Primary | ICD-10-CM

## 2025-04-28 NOTE — PROGRESS NOTES
This is an abnormal study.    1.  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  2.  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  3.  There is no evidence to support a right or left sided cervical (C5-T1) radiculopathy.    Christos Early,   Interventional Spine and Sports Medicine Specialist   Physical Medicine and Rehabilitation  [facility]  [address] [place] [id number]    [facility] [address] [place] [address]

## 2025-04-28 NOTE — PROCEDURES
[address]  [address] [address] [place] [address]  Phone: [phone]  Fax: [phone]    ELECTRODIAGNOSTIC REPORT          Patient: Mai Ray Hand Dominance:      Patient ID: [id number] Referring Dr:  Sean Valdez   Sex: Male Test Dr:  Christos Early DO   Date of Birth: [birth year]           Visit Date: 84 Years 9 Months Examining MD:     Age: 84 Years 9 Months Referred by:     Height: 5 feet 11 inch     Referred for: EXAM BUE  DOMINANT HAND R  ADMITS N/T & WEAKNESS   DENIES HX OF DMII, THYROID DISEASE, ALCOHOL ABUSE  CPL:2/10               Summary    The motor conduction test had results outside of the specified normal range in all 4 of the tested nerves:  In the R Median - APB study  the latency was increased for Wrist stimulation  the amplitude was reduced for Wrist stimulation  In the L Median - APB study  the latency was increased for Wrist stimulation  In the R Ulnar - ADM study  the velocity was reduced for A.Elbow - B.Elbow segment  In the L Ulnar - ADM study  the velocity was reduced for A.Elbow - B.Elbow segment    The sensory conduction test was performed on 6 nerve(s). The results were normal in 1 nerve(s): R Radial - Anatomical snuff box (Forearm). Results outside the specified normal range were found in 5 nerve(s), as follows:  In the R Median - Digit II (Antidromic) study  the peak latency was increased for Wrist stimulation  the peak amplitude was reduced for Wrist stimulation  the peak-to-peak amplitude was reduced for Wrist stimulation  In the L Median - Digit II (Antidromic) study  the peak latency was increased for Wrist stimulation  the peak amplitude was reduced for Wrist stimulation  the peak-to-peak amplitude was reduced for Wrist stimulation  In the R Ulnar - Digit V (Antidromic) study  the peak amplitude was reduced for Wrist stimulation  In the L Ulnar - Digit V (Antidromic) study  the peak amplitude was reduced for Wrist stimulation  In  the L Radial - Anatomical snuff box (Forearm) study  the peak latency was increased for Forearm stimulation    The needle EMG examination was performed in 12 muscles. It was normal in 8 muscle(s): L. Deltoid, R. Deltoid, L. Biceps brachii, R. Biceps brachii, L. Triceps brachii, R. Triceps brachii, L. Pronator teres, R. Pronator teres. The study was abnormal in 4 muscle(s), with the following distribution:  Abnormal spontaneous/insertional activity was found in L. First dorsal interosseous.  The MUP waveform abnormality was found in L. First dorsal interosseous, R. First dorsal interosseous.  Abnormal interference pattern was found in L. First dorsal interosseous, L. Abductor pollicis brevis, R. Abductor pollicis brevis.          Conclusion:       This is an abnormal study.     1.  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  2.  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  3.  There is no evidence to support a right or left sided cervical (C5-T1) radiculopathy.    Christos Early DO  Interventional Spine and Sports Medicine Specialist   Physical Medicine and Rehabilitation  [address] [address]    [address] [place] [address]            ________________________________               Motor NCS      Nerve / Sites Muscle Latency Amplitude Amp % Duration Segments Distance Lat Diff Velocity     ms mV % ms  cm ms m/s   R Median - APB      Wrist APB 6.41 3.8 100 7.60 Wrist - APB 8        Ref.  <=4.40 >=4.0 >=100  Ref.         Elbow  APB 10.99 3.3 86.8 7.71 Elbow - Wrist 23 4.58 50      Ref.    >=80  Ref.   >=49   L Median - APB      Wrist APB 6.25 6.3 100 6.77 Wrist - APB 8        Ref.  <=4.40 >=4.0 >=100  Ref.         Elbow APB 10.57 7.0 112 7.14 Elbow - Wrist 22 4.32 51      Ref.    >=80  Ref.   >=49   R Ulnar - ADM      Wrist ADM 3.33 8.0 100 5.94 Wrist - ADM 8        Ref.  <=3.70 >=5.0 >=100  Ref.         B.Elbow ADM 7.24 7.3 91.2 6.25 B.Elbow - Wrist 22 3.91 56      Ref.    >=80  Ref.   >=50      A.Elbow ADM 9.84 7.3 91.6 6.41 A.Elbow - B.Elbow 12 2.60 46      Ref.      Ref.   >=50   L Ulnar - ADM      Wrist ADM 3.07 8.9 100 6.30 Wrist - ADM 8        Ref.  <=3.70 >=5.0 >=100  Ref.         B.Elbow ADM 6.93 7.9 88.8 6.87 B.Elbow - Wrist 22 3.85 57      Ref.    >=80  Ref.   >=50      A.Elbow ADM 9.53 7.5 85 7.03 A.Elbow - B.Elbow 11 2.60 42      Ref.      Ref.   >=50       Sensory NCS      Nerve / Sites Rec. Site Onset Lat Peak Lat NP Amp PP Amp Segments Distance Velocity     ms ms µV µV  cm m/s   R Median - Digit II (Antidromic)      Wrist Dig II 4.69 6.25 7.6 14.5 Wrist - Dig II 13 28      Ref.   <=3.40 >=15.0 >=20.0 Ref.     L Median - Digit II (Antidromic)      Wrist Dig II 4.17 5.52 9.0 10.1 Wrist - Dig II 13 31      Ref.   <=3.40 >=15.0 >=20.0 Ref.     R Ulnar - Digit V (Antidromic)      Wrist Dig V 2.76 3.59 9.0 23.5 Wrist - Dig V 14 51      Ref.   <=3.70 >=15.0 >=15.0 Ref.     L Ulnar - Digit V (Antidromic)      Wrist Dig V 2.86 3.70 1.7 16.9 Wrist - Dig V 14 49      Ref.   <=3.70 >=15.0 >=15.0 Ref.     R Radial - Anatomical snuff box (Forearm)      Forearm Wrist 1.77 2.66 22.6 20.0 Forearm - Wrist 10 56      Ref.   <=2.70 >=5.0 >=5.0 Ref.     L Radial - Anatomical snuff box (Forearm)      Forearm Wrist 1.98 2.71 25.5 20.0 Forearm - Wrist 10 51      Ref.   <=2.70 >=5.0 >=5.0 Ref.         EMG Summary Table     Spontaneous MUAP Recruitment   Muscle Nerve Roots IA Fib PSW Fasc H.F. Comments Amp Dur. PPP Pattern   L. Deltoid Axillary  C5-C6 N None None None None Normal N N N N   R. Deltoid Axillary C5-C6 N None None None None Normal N N N N   L. Biceps brachii Musculocutaneous C5-C6 N None None None None Normal N N N N   R. Biceps brachii Musculocutaneous C5-C6 N None None None None Normal N N N N   L. Triceps brachii Radial C6-C8 N None None None None Normal N N N N   R. Triceps brachii Radial C6-C8 N None None None None Normal N N N N   L. Pronator teres Median C6-C7 N None None None None Normal N N N N   R. Pronator teres Median C6-C7 N None None None None Normal N N N N   L. First dorsal interosseous Ulnar C8-T1 1+ None 1+ None None Normal 3-5 N N Reduced   R. First dorsal interosseous Ulnar C8-T1 N None None None None Normal 5-7 N N N   L. Abductor pollicis brevis Median C8-T1 N None None None None Normal N N N Reduced   R. Abductor pollicis brevis Median C8-T1 N None None None None Normal N N N Reduced

## 2025-06-03 ENCOUNTER — OFFICE VISIT (OUTPATIENT)
Dept: INTERNAL MEDICINE CLINIC | Facility: CLINIC | Age: 85
End: 2025-06-03
Payer: MEDICARE

## 2025-06-03 VITALS
HEART RATE: 82 BPM | OXYGEN SATURATION: 97 % | TEMPERATURE: 97 F | SYSTOLIC BLOOD PRESSURE: 122 MMHG | DIASTOLIC BLOOD PRESSURE: 60 MMHG | WEIGHT: 197 LBS | BODY MASS INDEX: 29 KG/M2

## 2025-06-03 DIAGNOSIS — I10 ESSENTIAL HYPERTENSION: Primary | ICD-10-CM

## 2025-06-03 PROCEDURE — 99213 OFFICE O/P EST LOW 20 MIN: CPT | Performed by: INTERNAL MEDICINE

## 2025-06-03 NOTE — PROGRESS NOTES
Subjective:   Patient ID: Nish Oneill is a 84 year old male.    /60 (BP Location: Right arm, Patient Position: Sitting, Cuff Size: adult)   Pulse 82   Temp 97 °F (36.1 °C) (Temporal)   Wt 197 lb (89.4 kg)   SpO2 97%   BMI 29.09 kg/m²     HPI  Here for htn fu, home numbers well controlled, here with home cuff, feels well, no complaints, home cuff today 121/78, had emg but its not read yet  History/Other:   Review of Systems   Constitutional:  Negative for fever.   Respiratory:  Negative for shortness of breath.    Cardiovascular:  Negative for chest pain.   Endocrine: Negative for polyuria.   Neurological:  Negative for headaches.     Current Medications[1]  Allergies:Allergies[2]    Objective:   Physical Exam  Constitutional:       Appearance: Normal appearance.   Cardiovascular:      Rate and Rhythm: Regular rhythm.      Heart sounds: Normal heart sounds.   Pulmonary:      Breath sounds: Normal breath sounds.   Skin:     General: Skin is warm.   Neurological:      Mental Status: He is alert and oriented to person, place, and time.         Assessment & Plan:   1. Essential hypertension -cont htn meds, fu in April for pe       No orders of the defined types were placed in this encounter.      Meds This Visit:  Requested Prescriptions      No prescriptions requested or ordered in this encounter       Imaging & Referrals:  None         [1]   Current Outpatient Medications   Medication Sig Dispense Refill    hydroCHLOROthiazide 25 MG Oral Tab Take 1 tablet (25 mg total) by mouth daily. 90 tablet 3    losartan 50 MG Oral Tab Take 1 tablet (50 mg total) by mouth daily. 90 tablet 3    pravastatin 20 MG Oral Tab Take 1 tablet (20 mg total) by mouth nightly. 90 tablet 3    amLODIPine 5 MG Oral Tab Take 1 tablet (5 mg total) by mouth daily. 90 tablet 3   [2]   Allergies  Allergen Reactions    Ace Inhibitors Coughing

## 2025-07-22 ENCOUNTER — HOSPITAL ENCOUNTER (OUTPATIENT)
Age: 85
Discharge: ANOTHER HOSPITAL | End: 2025-07-22

## 2025-07-22 ENCOUNTER — HOSPITAL ENCOUNTER (INPATIENT)
Facility: HOSPITAL | Age: 85
LOS: 5 days | Discharge: HOME | End: 2025-07-27
Attending: STUDENT IN AN ORGANIZED HEALTH CARE EDUCATION/TRAINING PROGRAM | Admitting: HOSPITALIST

## 2025-07-22 ENCOUNTER — APPOINTMENT (OUTPATIENT)
Dept: CV DIAGNOSTICS | Facility: HOSPITAL | Age: 85
End: 2025-07-22
Attending: INTERNAL MEDICINE

## 2025-07-22 ENCOUNTER — APPOINTMENT (OUTPATIENT)
Dept: GENERAL RADIOLOGY | Facility: HOSPITAL | Age: 85
End: 2025-07-22
Attending: STUDENT IN AN ORGANIZED HEALTH CARE EDUCATION/TRAINING PROGRAM

## 2025-07-22 ENCOUNTER — NURSE TRIAGE (OUTPATIENT)
Dept: INTERNAL MEDICINE CLINIC | Facility: CLINIC | Age: 85
End: 2025-07-22

## 2025-07-22 VITALS
HEART RATE: 115 BPM | DIASTOLIC BLOOD PRESSURE: 88 MMHG | RESPIRATION RATE: 18 BRPM | SYSTOLIC BLOOD PRESSURE: 113 MMHG | OXYGEN SATURATION: 98 % | TEMPERATURE: 98 F

## 2025-07-22 DIAGNOSIS — I50.9 ACUTE ON CHRONIC CONGESTIVE HEART FAILURE, UNSPECIFIED HEART FAILURE TYPE (HCC): ICD-10-CM

## 2025-07-22 DIAGNOSIS — R06.02 SOB (SHORTNESS OF BREATH): ICD-10-CM

## 2025-07-22 DIAGNOSIS — I48.91 ATRIAL FIBRILLATION, UNSPECIFIED TYPE (HCC): Primary | ICD-10-CM

## 2025-07-22 DIAGNOSIS — I25.5 ISCHEMIC CARDIOMYOPATHY: ICD-10-CM

## 2025-07-22 DIAGNOSIS — G47.9 DIFFICULTY SLEEPING: ICD-10-CM

## 2025-07-22 DIAGNOSIS — R10.13 EPIGASTRIC PAIN: ICD-10-CM

## 2025-07-22 DIAGNOSIS — I48.91 ATRIAL FIBRILLATION WITH CONTROLLED VENTRICULAR RESPONSE (HCC): Primary | ICD-10-CM

## 2025-07-22 DIAGNOSIS — R14.0 ABDOMINAL BLOATING: ICD-10-CM

## 2025-07-22 PROBLEM — R00.2 PALPITATIONS: Status: ACTIVE | Noted: 2023-05-01

## 2025-07-22 PROBLEM — R00.1 BRADYCARDIA: Status: ACTIVE | Noted: 2023-05-01

## 2025-07-22 LAB
ALBUMIN SERPL-MCNC: 4.5 G/DL (ref 3.2–4.8)
ALBUMIN/GLOB SERPL: 1.9 (ref 1–2)
ALP LIVER SERPL-CCNC: 78 U/L (ref 45–117)
ALT SERPL-CCNC: 38 U/L (ref 10–49)
ANION GAP SERPL CALC-SCNC: 11 MMOL/L (ref 0–18)
APTT PPP: 27.7 SECONDS (ref 23–36)
APTT PPP: 54 SECONDS (ref 23–36)
AST SERPL-CCNC: 33 U/L (ref ?–34)
BASOPHILS # BLD AUTO: 0.02 X10(3) UL (ref 0–0.2)
BASOPHILS NFR BLD AUTO: 0.2 %
BILIRUB SERPL-MCNC: 1 MG/DL (ref 0.2–1.1)
BUN BLD-MCNC: 20 MG/DL (ref 9–23)
CALCIUM BLD-MCNC: 9.7 MG/DL (ref 8.7–10.6)
CHLORIDE SERPL-SCNC: 103 MMOL/L (ref 98–112)
CO2 SERPL-SCNC: 26 MMOL/L (ref 21–32)
CREAT BLD-MCNC: 1.21 MG/DL (ref 0.7–1.3)
EGFRCR SERPLBLD CKD-EPI 2021: 59 ML/MIN/1.73M2 (ref 60–?)
EOSINOPHIL # BLD AUTO: 0.05 X10(3) UL (ref 0–0.7)
EOSINOPHIL NFR BLD AUTO: 0.5 %
ERYTHROCYTE [DISTWIDTH] IN BLOOD BY AUTOMATED COUNT: 13.2 %
GLOBULIN PLAS-MCNC: 2.4 G/DL (ref 2–3.5)
GLUCOSE BLD-MCNC: 120 MG/DL (ref 70–99)
HCT VFR BLD AUTO: 43.4 % (ref 39–53)
HGB BLD-MCNC: 14.7 G/DL (ref 13–17.5)
IMM GRANULOCYTES # BLD AUTO: 0.02 X10(3) UL (ref 0–1)
IMM GRANULOCYTES NFR BLD: 0.2 %
INR BLD: 1.09 (ref 0.8–1.2)
LYMPHOCYTES # BLD AUTO: 1.54 X10(3) UL (ref 1–4)
LYMPHOCYTES NFR BLD AUTO: 16.2 %
MAGNESIUM SERPL-MCNC: 1.9 MG/DL (ref 1.6–2.6)
MCH RBC QN AUTO: 29.8 PG (ref 26–34)
MCHC RBC AUTO-ENTMCNC: 33.9 G/DL (ref 31–37)
MCV RBC AUTO: 88 FL (ref 80–100)
MONOCYTES # BLD AUTO: 0.75 X10(3) UL (ref 0.1–1)
MONOCYTES NFR BLD AUTO: 7.9 %
NEUTROPHILS # BLD AUTO: 7.11 X10 (3) UL (ref 1.5–7.7)
NEUTROPHILS # BLD AUTO: 7.11 X10(3) UL (ref 1.5–7.7)
NEUTROPHILS NFR BLD AUTO: 75 %
NT-PROBNP SERPL-MCNC: 4575 PG/ML (ref ?–450)
OSMOLALITY SERPL CALC.SUM OF ELEC: 294 MOSM/KG (ref 275–295)
PLATELET # BLD AUTO: 238 10(3)UL (ref 150–450)
POTASSIUM SERPL-SCNC: 3.9 MMOL/L (ref 3.5–5.1)
PROT SERPL-MCNC: 6.9 G/DL (ref 5.7–8.2)
PROTHROMBIN TIME: 14.2 SECONDS (ref 11.6–14.8)
Q-T INTERVAL: 298 MS
Q-T INTERVAL: 304 MS
QRS DURATION: 92 MS
QRS DURATION: 92 MS
QTC CALCULATION (BEZET): 467 MS
QTC CALCULATION (BEZET): 472 MS
R AXIS: -1 DEGREES
R AXIS: 3 DEGREES
RBC # BLD AUTO: 4.93 X10(6)UL (ref 3.8–5.8)
SODIUM SERPL-SCNC: 140 MMOL/L (ref 136–145)
T AXIS: 82 DEGREES
T AXIS: 85 DEGREES
TROPONIN I SERPL HS-MCNC: 26 NG/L (ref ?–53)
TSI SER-ACNC: 2.07 UIU/ML (ref 0.55–4.78)
VENTRICULAR RATE: 142 BPM
VENTRICULAR RATE: 151 BPM
WBC # BLD AUTO: 9.5 X10(3) UL (ref 4–11)

## 2025-07-22 PROCEDURE — 99223 1ST HOSP IP/OBS HIGH 75: CPT | Performed by: HOSPITALIST

## 2025-07-22 PROCEDURE — 71045 X-RAY EXAM CHEST 1 VIEW: CPT | Performed by: STUDENT IN AN ORGANIZED HEALTH CARE EDUCATION/TRAINING PROGRAM

## 2025-07-22 PROCEDURE — 99215 OFFICE O/P EST HI 40 MIN: CPT | Performed by: EMERGENCY MEDICINE

## 2025-07-22 PROCEDURE — 93000 ELECTROCARDIOGRAM COMPLETE: CPT | Performed by: EMERGENCY MEDICINE

## 2025-07-22 PROCEDURE — 93306 TTE W/DOPPLER COMPLETE: CPT | Performed by: INTERNAL MEDICINE

## 2025-07-22 RX ORDER — LOSARTAN POTASSIUM 25 MG/1
50 TABLET ORAL DAILY
Status: DISCONTINUED | OUTPATIENT
Start: 2025-07-23 | End: 2025-07-26

## 2025-07-22 RX ORDER — ONDANSETRON 2 MG/ML
4 INJECTION INTRAMUSCULAR; INTRAVENOUS EVERY 6 HOURS PRN
Status: DISCONTINUED | OUTPATIENT
Start: 2025-07-22 | End: 2025-07-27

## 2025-07-22 RX ORDER — METOCLOPRAMIDE HYDROCHLORIDE 5 MG/ML
5 INJECTION INTRAMUSCULAR; INTRAVENOUS EVERY 8 HOURS PRN
Status: DISCONTINUED | OUTPATIENT
Start: 2025-07-22 | End: 2025-07-27

## 2025-07-22 RX ORDER — DILTIAZEM HYDROCHLORIDE 5 MG/ML
10 INJECTION INTRAVENOUS ONCE
Status: COMPLETED | OUTPATIENT
Start: 2025-07-22 | End: 2025-07-22

## 2025-07-22 RX ORDER — HYDROCHLOROTHIAZIDE 12.5 MG/1
25 TABLET ORAL DAILY
Status: DISCONTINUED | OUTPATIENT
Start: 2025-07-23 | End: 2025-07-23

## 2025-07-22 RX ORDER — FUROSEMIDE 10 MG/ML
20 INJECTION INTRAMUSCULAR; INTRAVENOUS
Status: DISCONTINUED | OUTPATIENT
Start: 2025-07-23 | End: 2025-07-24

## 2025-07-22 RX ORDER — PRAVASTATIN SODIUM 20 MG
20 TABLET ORAL NIGHTLY
Status: DISCONTINUED | OUTPATIENT
Start: 2025-07-22 | End: 2025-07-27

## 2025-07-22 RX ORDER — ACETAMINOPHEN 500 MG
500 TABLET ORAL EVERY 4 HOURS PRN
Status: DISCONTINUED | OUTPATIENT
Start: 2025-07-22 | End: 2025-07-27

## 2025-07-22 RX ORDER — FUROSEMIDE 10 MG/ML
20 INJECTION INTRAMUSCULAR; INTRAVENOUS ONCE
Status: COMPLETED | OUTPATIENT
Start: 2025-07-22 | End: 2025-07-22

## 2025-07-22 RX ORDER — HEPARIN SODIUM 1000 [USP'U]/ML
5000 INJECTION, SOLUTION INTRAVENOUS; SUBCUTANEOUS ONCE
Status: COMPLETED | OUTPATIENT
Start: 2025-07-22 | End: 2025-07-22

## 2025-07-22 RX ORDER — HEPARIN SODIUM AND DEXTROSE 10000; 5 [USP'U]/100ML; G/100ML
INJECTION INTRAVENOUS CONTINUOUS
Status: DISPENSED | OUTPATIENT
Start: 2025-07-22 | End: 2025-07-25

## 2025-07-22 RX ORDER — ECHINACEA PURPUREA EXTRACT 125 MG
1 TABLET ORAL
Status: DISCONTINUED | OUTPATIENT
Start: 2025-07-22 | End: 2025-07-27

## 2025-07-22 RX ORDER — HEPARIN SODIUM AND DEXTROSE 10000; 5 [USP'U]/100ML; G/100ML
1000 INJECTION INTRAVENOUS ONCE
Status: COMPLETED | OUTPATIENT
Start: 2025-07-22 | End: 2025-07-22

## 2025-07-22 NOTE — TELEPHONE ENCOUNTER
Patient booked an appointment online for the following concerns:    \"Bloated stomach and shortness of breath. Poor sleeping (only ~ 3 hours per night)\"    Requesting RN assistance with triage please and thank you.     Also if booking with provider please schedule for 40 minute appointment.    Mid level provider 30 minutes.    Thank you!

## 2025-07-22 NOTE — TELEPHONE ENCOUNTER
Action Requested: Summary for Provider     []  Critical Lab, Recommendations Needed  [] Need Additional Advice  []   FYI    []   Need Orders  [] Need Medications Sent to Pharmacy  []  Other     SUMMARY: Patient reports shortness of breath that is causing him to wake at night and abdominal bloating for the past 3-4 days. Shortness of breath was gradual, worsens with exertion. Lungs feel like they are burning. Patient is active, golfs and exercises at health club. Has to stop frequently to rest. Denies cough or wheezing. Advised patient to go to  now for immediate evaluation. Patient is agreeable to plan. [place] location reviewed. Will keep appointment this week as follow up appointment.     Reason for call: Shortness Of Breath  Onset: 3-4 days     Reason for Disposition   MILD difficulty breathing (e.g., minimal/no SOB at rest, SOB with walking, pulse < 100) of new-onset or worse than normal    Protocols used: Breathing Difficulty-A-OH

## 2025-07-22 NOTE — PLAN OF CARE
Assumed care of patient at 1445 from ED. Patient alert and oriented x4. Afib on telemetry. Patient declines any pain. On heparin and cardizem gtts. Call light within reach. Patient instructed to call for help when getting up.     Problem: CARDIOVASCULAR - ADULT  Goal: Maintains optimal cardiac output and hemodynamic stability  Description: INTERVENTIONS:  - Monitor vital signs, rhythm, and trends  - Monitor for bleeding, hypotension and signs of decreased cardiac output  - Evaluate effectiveness of vasoactive medications to optimize hemodynamic stability  - Monitor arterial and/or venous puncture sites for bleeding and/or hematoma  - Assess quality of pulses, skin color and temperature  - Assess for signs of decreased coronary artery perfusion - ex. Angina  - Evaluate fluid balance, assess for edema, trend weights  Outcome: Progressing  Goal: Absence of cardiac arrhythmias or at baseline  Description: INTERVENTIONS:  - Continuous cardiac monitoring, monitor vital signs, obtain 12 lead EKG if indicated  - Evaluate effectiveness of antiarrhythmic and heart rate control medications as ordered  - Initiate emergency measures for life threatening arrhythmias  - Monitor electrolytes and administer replacement therapy as ordered  Outcome: Progressing

## 2025-07-22 NOTE — ED INITIAL ASSESSMENT (HPI)
Pt to ed from [facility] pt reports he went for abdominal pain and sob, pt was placed on monitors and found to be in Afib RVR, pt denies any chest pain/palpitations.

## 2025-07-22 NOTE — ED QUICK NOTES
Orders for admission, patient is aware of plan and ready to go upstairs. Any questions, please call ED RN Benton at extension [phone].     Patient Covid vaccination status: Fully vaccinated     COVID Test Ordered in ED: None    COVID Suspicion at Admission: N/A    Running Infusions: Medication Infusions[1]     Mental Status/LOC at time of transport: AxOx4    Other pertinent information: pt went to [facility] with abdominal pain, was found to be in Afib RVR, currently on cardizem and heparin, denies chest pain/palpitations  CIWA score: N/A   NIH score:  N/A      Rounding Completed    Plan of Care reviewed. Waiting for room to be cleaned.  Elimination needs assessed.  Provided updated.    Bed is locked and in lowest position. Call light within reach.         [1]    dilTIAZem 10 mg/hr (07/22/25 1324)    continuous dose heparin

## 2025-07-22 NOTE — ED PROVIDER NOTES
Patient Seen in: [facility] Emergency Department        History  Chief Complaint   Patient presents with    Arrythmia/Palpitations     Pt to ed from [facility] for afib-rvr, pt went to     Stated Complaint: a. fib with RVR- via 911    Subjective:   HPI            The patient is a pleasant 85-year-old male presented to the emergency room with reports of decreased exercise tolerance along with orthopnea found to be in A-fib with RVR.  He states he has been having shortness of breath with activity for the last 4 days.  He also states he has not been able to sleep.  He feels his belly is slightly distended.  Of note patient has lower extremity swelling but states this is chronic for him.  He has no chest pain.  Patient has never had A-fib to the best of his knowledge.      Objective:     Past Medical History:    Acute appendicitis with generalized peritonitis, without gangrene or abscess    Acute appendicitis with generalized peritonitis, without gangrene or abscess, unspecified whether perforation present    Appendicitis    Back problem    BPH (benign prostatic hyperplasia)    Cancer (HCC)    prostate cancer    Cataract    Edema    Essential hypertension    High blood pressure    High cholesterol    Hyperlipidemia    Lump or mass in breast    Psychosexual dysfunction with inhibited sexual excitement    Sciatica    Special screening for malignant neoplasm of prostate              Past Surgical History:   Procedure Laterality Date    Appendectomy      Cataract Bilateral 2019    Colonoscopy  19    Dr. Paige    Tonsillectomy      with adenoidectomy                Social History     Socioeconomic History    Marital status:    Tobacco Use    Smoking status: Former     Current packs/day: 0.00     Types: Cigarettes     Quit date: 1985     Years since quittin.4    Smokeless tobacco: Never   Vaping Use    Vaping status: Never Used   Substance and Sexual Activity    Alcohol use: Yes     Alcohol/week: 2.0  - 3.0 standard drinks of alcohol     Types: 2 - 3 Glasses of wine per week     Comment: 2-3 glasses per month    Drug use: Never   Other Topics Concern    Caffeine Concern Yes    Exercise No     Comment: not currently   Social History Narrative    The patient does not use an assistive device..      The patient does live in a home with stairs.     Social Drivers of Health     Food Insecurity: No Food Insecurity (7/22/2025)    NCSS - Food Insecurity     Worried About Running Out of Food in the Last Year: No     Ran Out of Food in the Last Year: No   Transportation Needs: No Transportation Needs (7/22/2025)    NCSS - Transportation     Lack of Transportation: No   Housing Stability: Not At Risk (7/22/2025)    NCSS - Housing/Utilities     Has Housing: Yes     Worried About Losing Housing: No     Unable to Get Utilities: No                                Physical Exam    ED Triage Vitals [07/22/25 1136]   BP (!) 141/108   Pulse (!) 130   Resp 20   Temp 98 °F (36.7 °C)   Temp src Oral   SpO2 98 %   O2 Device None (Room air)       Current Vitals:   Vital Signs  BP: 104/87  Pulse: 110  Resp: 20  Temp: 97.5 °F (36.4 °C)  Temp src: Oral  MAP (mmHg): 94    Oxygen Therapy  SpO2: 93 %  O2 Device: None (Room air)  Pulse Oximetry Type: Continuous  Oximetry Probe Site Changed: No  Pulse Ox Probe Location: Left hand            Physical Exam  Vitals and nursing note reviewed.   Constitutional:       General: He is not in acute distress.     Appearance: Normal appearance.   HENT:      Head: Normocephalic.      Nose: Nose normal.      Mouth/Throat:      Mouth: Mucous membranes are moist.   Eyes:      Extraocular Movements: Extraocular movements intact.      Pupils: Pupils are equal, round, and reactive to light.   Cardiovascular:      Rate and Rhythm: Normal rate and regular rhythm.      Pulses: Normal pulses.   Pulmonary:      Effort: Pulmonary effort is normal.      Breath sounds: Normal breath sounds.   Abdominal:      General:  Abdomen is flat. Bowel sounds are normal. There is no distension.      Palpations: Abdomen is soft.      Tenderness: There is no abdominal tenderness. There is no right CVA tenderness, left CVA tenderness, guarding or rebound.      Hernia: No hernia is present.   Musculoskeletal:         General: No swelling or tenderness. Normal range of motion.      Cervical back: Normal range of motion.      Right lower leg: Edema present.      Left lower leg: Edema present.   Skin:     General: Skin is warm and dry.   Neurological:      Mental Status: He is alert and oriented to person, place, and time. Mental status is at baseline.   Psychiatric:         Mood and Affect: Mood normal.                 ED Course  Labs Reviewed   COMP METABOLIC PANEL (14) - Abnormal; Notable for the following components:       Result Value    Glucose 120 (*)     eGFR-Cr 59 (*)     All other components within normal limits   PRO BETA NATRIURETIC PEPTIDE - Abnormal; Notable for the following components:    Pro-Beta Natriuretic Peptide 4,575 (*)     All other components within normal limits   PROTHROMBIN TIME (PT) - Normal   PTT, ACTIVATED - Normal   TROPONIN I HIGH SENSITIVITY - Normal   MAGNESIUM - Normal   TSH W REFLEX TO FREE T4 - Normal   CBC WITH DIFFERENTIAL WITH PLATELET   PTT, ACTIVATED   RAINBOW DRAW LAVENDER   RAINBOW DRAW LIGHT GREEN   RAINBOW DRAW GOLD   RAINBOW DRAW BLUE     EKG    Rate, intervals and axes as noted on EKG Report.  Rate: 151  Rhythm: Atrial Fibrillation  Reading: no yobany/dep or t wave inversion   When compared to prior pt now in afib, prev sinus              XR CHEST AP PORTABLE  (CPT=71045)  Result Date: 7/22/2025  CONCLUSION: See above. Electronically Verified and Signed by Attending Radiologist: Sma Abdi MD 7/22/2025 11:58 AM Workstation: [id number]                      [facility]         Admission disposition: 7/22/2025 12:45 PM           Medical Decision Making  The differential includes the following  Heart failure with  exacerbation, new onset A-fib secondary to heart failure lower    Pertinent comorbidities include  As detailed above    Pertinent social history includes  As detailed above    Labs  Troponin is 26, proBNP is 4500    Imaging studies  I reviewed the images and my independent interpreation after review is bilateral opacities. Additionaly, I reviewd the radiology report that states the following XR CHEST AP PORTABLE  (CPT=71045)  Result Date: 7/22/2025  CONCLUSION: See above. Electronically Verified and Signed by Attending Radiologist: Sam Abdi MD 7/22/2025 11:58 AM Workstation: [id number]        External data reviewed    Discussion of management with external providers  [facility] APN    ER course  On arrival patient tachycardic to the 150s and 160s in A-fib but otherwise hemodynamically stable.  Patient given 10 mg IV Dilt.  He has been placed on a dill drip after consultation with [facility] who agrees with this.  He has also been given 40 mg IV Lasix and admitted to the hospitalist.  I explained the need for admission for IV diuresis echocardiogram and further workup to the patient and his wife and son.    Disposition and Plan     Clinical Impression:  1. Atrial fibrillation with controlled ventricular response (HCC)    2. Acute on chronic congestive heart failure, unspecified heart failure type (HCC)         Disposition:  Admit  7/22/2025 12:45 pm    Follow-up:  No follow-up provider specified.        Medications Prescribed:  Current Discharge Medication List                Supplementary Documentation:         Hospital Problems       Present on Admission  Date Reviewed: 7/22/2025          ICD-10-CM Noted POA    * (Principal) Atrial fibrillation with controlled ventricular response (HCC) I48.91 7/22/2025 Unknown    Acute on chronic congestive heart failure, unspecified heart failure type (HCC) I50.9 7/22/2025 Unknown    Atrial fibrillation with RVR (HCC) I48.91 7/22/2025 Unknown    Essential hypertension I10 6/29/2012 Yes     Prostate cancer (HCC) C61 4/24/2024 Yes    Pure hypercholesterolemia E78.00 6/29/2012 Yes

## 2025-07-22 NOTE — ED QUICK NOTES
Pt awake and alert, skin w/d,resps reg/unlabored. Pt denies cp, sob or any other complaints.    Rounding Completed    Plan of Care reviewed. Waiting for bed assignment.  Elimination needs assessed.  Provided update to pt and  family.    Bed is locked and in lowest position. Call light within reach.

## 2025-07-22 NOTE — ED INITIAL ASSESSMENT (HPI)
Patient states 5 days of shortness of breathe, tired.  Bloated- feels constipated     Roman Banks L

## 2025-07-22 NOTE — PLAN OF CARE
NURSING ADMISSION NOTE     Patient admitted via Cart  Oriented to room.  Safety precautions initiated.  Bed locked and in lowest position.  Call light in reach.     Admission navigator complete.   Patient wears reading glasses.   Walks independently at home.  Wife at bedside.  Heparin & cardizem gtt.

## 2025-07-22 NOTE — PROGRESS NOTES
07/22/25 1446 07/22/25 1449 07/22/25 1451   Vital Signs   /83 114/77 104/87   MAP (mmHg) 94 84 94   BP Location Right arm Right arm Right arm   BP Method Automatic Automatic Automatic   Patient Position Lying Sitting Standing     Declines dizziness

## 2025-07-22 NOTE — ED PROVIDER NOTES
Patient Seen in: [facility]      History  Chief Complaint   Patient presents with    Shortness Of Breath     Stated Complaint: Sob, bloating, trouble sleeping    Subjective:   HPI          Nish Oneill is a 85 year old male here for 5 days of shortness of breath, abdominal bloating, and difficulty sleeping.  Exertional symptoms (+). Shortness of breath wakes him from sleep.  Mild epigastric burning, but does not radiate.  No between shoulder blade pain.  Family history of cardiovascular disease, and MI, but no sudden death.  Elevated heart rate noted otherwise well-appearing.         Objective:     Past Medical History:    Acute appendicitis with generalized peritonitis, without gangrene or abscess    Acute appendicitis with generalized peritonitis, without gangrene or abscess, unspecified whether perforation present    Appendicitis    Back problem    BPH (benign prostatic hyperplasia)    Cancer (HCC)    prostate cancer    Cataract    Edema    Essential hypertension    High blood pressure    High cholesterol    Hyperlipidemia    Lump or mass in breast    Psychosexual dysfunction with inhibited sexual excitement    Sciatica    Special screening for malignant neoplasm of prostate              Past Surgical History:   Procedure Laterality Date    Appendectomy      Cataract Bilateral 2019    Colonoscopy  17    Dr. Paige    Tonsillectomy      with adenoidectomy                Social History     Socioeconomic History    Marital status:    Tobacco Use    Smoking status: Former     Current packs/day: 0.00     Types: Cigarettes     Quit date: 1985     Years since quittin.4    Smokeless tobacco: Never   Vaping Use    Vaping status: Never Used   Substance and Sexual Activity    Alcohol use: Yes     Alcohol/week: 2.0 - 3.0 standard drinks of alcohol     Types: 2 - 3 Glasses of wine per week     Comment: 2-3 glasses per month    Drug use: Never   Other Topics Concern     Caffeine Concern Yes    Exercise No     Comment: not currently   Social History Narrative    The patient does not use an assistive device..      The patient does live in a home with stairs.              Review of Systems    Positive for stated complaint: Sob, bloating, trouble sleeping  Other systems are as noted in HPI.  Constitutional and vital signs reviewed.      All other systems reviewed and negative except as noted above.                  Physical Exam    ED Triage Vitals [07/22/25 1045]   /88   Pulse 115   Resp 18   Temp 97.7 °F (36.5 °C)   Temp src Oral   SpO2 98 %   O2 Device None (Room air)       Current Vitals:   Vital Signs  BP: 113/88  Pulse: 115  Resp: 18  Temp: 97.7 °F (36.5 °C)  Temp src: Oral    Oxygen Therapy  SpO2: 98 %  O2 Device: None (Room air)            Physical Exam  Vitals and nursing note reviewed.   Constitutional:       General: He is not in acute distress.     Appearance: Normal appearance. He is well-developed. He is not toxic-appearing.   HENT:      Head: Normocephalic.      Nose: Nose normal.   Eyes:      Extraocular Movements: Extraocular movements intact.      Conjunctiva/sclera: Conjunctivae normal.      Right eye: Right conjunctiva is not injected.      Left eye: Left conjunctiva is not injected.      Pupils: Pupils are equal, round, and reactive to light.   Cardiovascular:      Rate and Rhythm: Tachycardia present. Rhythm irregular. Extrasystoles are present.     Pulses: Decreased pulses (slight).   Pulmonary:      Effort: Pulmonary effort is normal. Tachypnea (22 RR) present. No accessory muscle usage.      Breath sounds: Decreased breath sounds present. No rhonchi.   Abdominal:      General: There is distension.      Tenderness: There is no abdominal tenderness. There is no guarding.   Musculoskeletal:      Cervical back: Normal range of motion and neck supple.   Skin:     General: Skin is warm.      Capillary Refill: Capillary refill takes less than 2 seconds.       Coloration: Skin is not ashen, cyanotic, mottled or pale.      Findings: No bruising, ecchymosis or rash.   Neurological:      General: No focal deficit present.      Mental Status: He is alert and oriented to person, place, and time.      Motor: No weakness.   Psychiatric:         Mood and Affect: Mood is anxious.         Behavior: Behavior normal.         Thought Content: Thought content normal.         Judgment: Judgment normal.                 ED Course  Labs Reviewed - No data to display  EKG    Rate, intervals and axes as noted on EKG Report.  Rate: 142  Rhythm: afib rvr  Reading: abn                                MDM             Medical Decision Making  [facility] to [facility] emergency department for higher level of care for A-fib with RVR, and shortness of breath.  Mild epigastric chest pain, denies substernal chest pain, or radiating pain.  He is of sound mind with decision-making capabilities.  No sufficient of altered mental status, alcohol, or drug use.    Patient left in stable condition with ambulance.    Problems Addressed:  Abdominal bloating: acute illness or injury  Atrial fibrillation, unspecified type (HCC): acute illness or injury  Difficulty sleeping: acute illness or injury  Epigastric pain: acute illness or injury  SOB (shortness of breath): acute illness or injury    Amount and/or Complexity of Data Reviewed  ECG/medicine tests: ordered and independent interpretation performed. Decision-making details documented in ED Course.     Details: After independent interpretation I agree with radiologist No acute findings A-fib with RVR noted on EKG.        Disposition and Plan     Clinical Impression:  1. Atrial fibrillation, unspecified type (HCC)    2. SOB (shortness of breath)    3. Difficulty sleeping    4. Epigastric pain    5. Abdominal bloating         Disposition:   to ed  7/22/2025 10:56 am    Follow-up:  Sean Valdez MD  [address] [place] [address]  [address]  [facility] [address]  [phone]                Medications Prescribed:  Discharge Medication List as of 7/22/2025 11:12 AM                Supplementary Documentation:

## 2025-07-22 NOTE — H&P
[facility]     Nish Oneill Patient Status:  Emergency    1940 MRN [id number]   Location [facility] EMERGENCY DEPARTMENT Attending Shayy Boone MD   Hosp Day # 0 PCP Sean Valdez MD     Chief Complaint: SOB, exertional dyspnea    Subjective:    History of Present Illness:     Nish Oneill is a 85 year old male with past medical history significant for HTN, DL, BPH, prostate cancer present to the ER with difficulty breathing.  Pt states that he has noticed his breathing is more labored.  He also c/o difficulty lying down flat and of swelling in his legs.  He denies chest pain, palpitations, lightheadedness, or dizziness.  He also noticed increasing abdominal distension.  He went to immediate care today and was transferred to the ER.  He was found to be in A fib with RVR and pro-bnp was elevated.  He was started on cardizem and heparin drips and received IV lasix with improvement of his breathing.    History/Other:    Past Medical History:  Past Medical History[1]  Past Surgical History:   Past Surgical History[2]   Family History:   Family History[3]  Social History:    reports that he quit smoking about 40 years ago. His smoking use included cigarettes. He has never used smokeless tobacco. He reports current alcohol use of about 2.0 - 3.0 standard drinks of alcohol per week. He reports that he does not use drugs.     Allergies: Allergies[4]    Medications:  Medications Ordered Prior to Encounter[5]    Review of Systems:   A comprehensive review of systems was completed.    Pertinent positives and negatives noted in the HPI.    Objective:   Physical Exam:    BP (!) 132/109   Pulse (!) 186   Temp 98 °F (36.7 °C) (Oral)   Resp 26   Ht 5' 11\" (1.803 m)   Wt 195 lb (88.5 kg)   SpO2 95%   BMI 27.20 kg/m²   General: No acute distress, Alert  Respiratory: No rhonchi, no wheezes  Cardiovascular: S1, S2. IR /IR  Abdomen: Soft, Non-tender, non-distended,  positive bowel sounds  Neuro: No new focal deficits  Extremities: + LE edema      Results:    Labs:      Labs Last 24 Hours:    Recent Labs   Lab 07/22/25  1134   RBC 4.93   HGB 14.7   HCT 43.4   MCV 88.0   MCH 29.8   MCHC 33.9   RDW 13.2   NEPRELIM 7.11   WBC 9.5   .0       Recent Labs   Lab 07/22/25  1134   *   BUN 20   CREATSERUM 1.21   EGFRCR 59*   CA 9.7   ALB 4.5      K 3.9      CO2 26.0   ALKPHO 78   AST 33   ALT 38   BILT 1.0   TP 6.9       Estimated Glomerular Filtration Rate: 59 mL/min/1.73m2 (A) (result from lab).    Lab Results   Component Value Date    INR 1.09 07/22/2025       Recent Labs   Lab 07/22/25  1134   TROPHS 26       Recent Labs   Lab 07/22/25  1134   PBNP 4,575*       No results for input(s): \"PCT\" in the last 168 hours.    Imaging: Imaging data reviewed in Epic.    Assessment & Plan:      #Exertional dyspnea, likely related to new onset CHF, A fib with RVR  Await echocardiogram  IV diuretics  Pro-bnp, CXR reviewed  Troponin normal  Cardiology to eval  Consider ischemic eval once volume status improves    #Atrial fibrillation with RVR  TSH  Echo pending  Heparin gtt  Cardizem gtt    #HTN, controlled  Resume home meds as able    #DL, statin    #BPH, prostate cancer      Plan of care discussed with pt and wife.    Samantha Quach MD    Supplementary Documentation:     The 21st Century Cures Act makes medical notes like these available to patients in the interest of transparency. Please be advised this is a medical document. Medical documents are intended to carry relevant information, facts as evident, and the clinical opinion of the practitioner. The medical note is intended as peer to peer communication and may appear blunt or direct. It is written in medical language and may contain abbreviations or verbiage that are unfamiliar.                                       [1]   Past Medical History:   Acute appendicitis with generalized peritonitis, without gangrene or  abscess    Acute appendicitis with generalized peritonitis, without gangrene or abscess, unspecified whether perforation present    Appendicitis    Back problem    BPH (benign prostatic hyperplasia)    Cancer (HCC)    prostate cancer    Cataract    Edema    Essential hypertension    High blood pressure    High cholesterol    Hyperlipidemia    Lump or mass in breast    Psychosexual dysfunction with inhibited sexual excitement    Sciatica    Special screening for malignant neoplasm of prostate   [2]   Past Surgical History:  Procedure Laterality Date    Appendectomy      Cataract Bilateral 06/2019    Colonoscopy  1/5/17    Dr. Paige    Tonsillectomy      with adenoidectomy   [3]   Family History  Problem Relation Age of Onset    Cancer Sister     Other (CHF) Mother     Stroke Father     Other (CHF) Father     Other (CAD) Brother     Lipids Brother         hyperlipidemia   [4]   Allergies  Allergen Reactions    Ace Inhibitors Coughing   [5]   No current facility-administered medications on file prior to encounter.     Current Outpatient Medications on File Prior to Encounter   Medication Sig Dispense Refill    hydroCHLOROthiazide 25 MG Oral Tab Take 1 tablet (25 mg total) by mouth daily. 90 tablet 3    losartan 50 MG Oral Tab Take 1 tablet (50 mg total) by mouth daily. 90 tablet 3    pravastatin 20 MG Oral Tab Take 1 tablet (20 mg total) by mouth nightly. 90 tablet 3    amLODIPine 5 MG Oral Tab Take 1 tablet (5 mg total) by mouth daily. 90 tablet 3

## 2025-07-22 NOTE — CONSULTS
[facility]  Report of Consultation    Nish Oneill Patient Status:  Emergency    1940 MRN [id number]   [facility] EMERGENCY DEPARTMENT Attending Shayy Boone MD   Hosp Day # 0 PCP Sean Valdez MD     Reason for Consultation:  Afib    History of Present Illness:  Nish Oneill is a a(n) 85 year old male presenting with dyspnea on exertion, abdominal fullness and PND for the last 4 days.  Has noted decreased exercise capacity and fast heart rate when exercising.    Denies chest pain. Gets short of breath after 1/2 block. No prior cardiac history.    Has HTN and hyperlipidemia. Denies DM, former smoker. No history of CVA      History:  Past Medical History[1]  Past Surgical History[2]  Family History[3]   reports that he quit smoking about 40 years ago. His smoking use included cigarettes. He has never used smokeless tobacco. He reports current alcohol use of about 2.0 - 3.0 standard drinks of alcohol per week. He reports that he does not use drugs.    Allergies:  Allergies[4]    Medications:  Current Hospital Medications[5]    Review of Systems:  All systems were reviewed and are negative except as described above in HPI.    Physical Exam:  Blood pressure (!) 128/95, pulse 119, temperature 98 °F (36.7 °C), temperature source Oral, resp. rate 17, height 5' 11\" (1.803 m), weight 195 lb (88.5 kg), SpO2 97%.  Temp (24hrs), Av.9 °F (36.6 °C), Min:97.7 °F (36.5 °C), Max:98 °F (36.7 °C)    Wt Readings from Last 3 Encounters:   25 195 lb (88.5 kg)   25 197 lb (89.4 kg)   25 197 lb (89.4 kg)       Telemetry: Afib with RVR  General: Alert and oriented in no apparent distress.  HEENT: No focal deficits.  Neck: No JVD, carotids 2+ no bruits.  Cardiac: Irregular tachycardia, S1, S2 normal, no murmur, rub or gallop.  Lungs: Occasional crackle.  Normal excursions and effort.  Abdomen: Soft, non-tender.   Extremities: Without clubbing, cyanosis. 1+ edema.  Peripheral  pulses are 2+.  Neurologic: Alert and oriented, normal affect.  Skin: Warm and dry.     Laboratories and Data:  Diagnostics:  EKG: Afib with RVR  CXR: No edema    Labs:   Lab Results   Component Value Date    WBC 9.5 07/22/2025    RBC 4.93 07/22/2025    HGB 14.7 07/22/2025    HCT 43.4 07/22/2025    MCV 88.0 07/22/2025    MCH 29.8 07/22/2025    MCHC 33.9 07/22/2025    RDW 13.2 07/22/2025    .0 07/22/2025     Lab Results   Component Value Date    INR 1.09 07/22/2025       Assessment/Plan:  Problem List[6]    Afib with RVR   - IV cardizem and IV heparin started in ED   - Titrate cardizem for rate control   - Eventual DOAC   - Consider CHRISTA guided cardioversion if rates not controlled    2. Shortness of breath   - Concerning for concomitant cardiomyopathy   - Echo for LV function   - IV diuresis and monitor renal function    3. HTN   - Controlled   - Monitor with medication changes    4. Hyperlipidemia   - Cont statin     Fernando Schroeder MD  7/22/2025  2:06 PM         [1]   Past Medical History:   Acute appendicitis with generalized peritonitis, without gangrene or abscess    Acute appendicitis with generalized peritonitis, without gangrene or abscess, unspecified whether perforation present    Appendicitis    Back problem    BPH (benign prostatic hyperplasia)    Cancer (HCC)    prostate cancer    Cataract    Edema    Essential hypertension    High blood pressure    High cholesterol    Hyperlipidemia    Lump or mass in breast    Psychosexual dysfunction with inhibited sexual excitement    Sciatica    Special screening for malignant neoplasm of prostate   [2]   Past Surgical History:  Procedure Laterality Date    Appendectomy      Cataract Bilateral 06/2019    Colonoscopy  1/5/17    Dr. Paige    Tonsillectomy      with adenoidectomy   [3]   Family History  Problem Relation Age of Onset    Cancer Sister     Other (CHF) Mother     Stroke Father     Other (CHF) Father     Other (CAD) Brother     Lipids Brother          hyperlipidemia   [4]   Allergies  Allergen Reactions    Ace Inhibitors Coughing   [5]   Current Facility-Administered Medications:     dilTIAZem (cardIZEM) 100 mg in sodium chloride 0.9% 100 mL IVPB-ADDV, 5 mg/hr, Intravenous, Continuous    heparin (Porcine) 24168 units/250mL infusion ED INITIAL DOSE, 1,000 Units/hr, Intravenous, Once    heparin (Porcine) 21312 units/250mL infusion ACS/AFIB CONTINUOUS, 200-3,000 Units/hr, Intravenous, Continuous  [6]   Patient Active Problem List  Diagnosis    Screening for thyroid disorder    Essential hypertension    Pure hypercholesterolemia    Shingles rash    DDD (degenerative disc disease), lumbar    Spondylosis of cervical region without myelopathy or radiculopathy    Prostate cancer (HCC)    Palpitations    Bradycardia    Atrial fibrillation with controlled ventricular response (HCC)

## 2025-07-23 ENCOUNTER — APPOINTMENT (OUTPATIENT)
Dept: INTERVENTIONAL RADIOLOGY/VASCULAR | Facility: HOSPITAL | Age: 85
End: 2025-07-23
Attending: INTERNAL MEDICINE

## 2025-07-23 ENCOUNTER — APPOINTMENT (OUTPATIENT)
Dept: CV DIAGNOSTICS | Facility: HOSPITAL | Age: 85
End: 2025-07-23
Attending: HOSPITALIST

## 2025-07-23 PROBLEM — I42.9 CARDIOMYOPATHY (HCC): Status: ACTIVE | Noted: 2025-07-23

## 2025-07-23 PROBLEM — I50.21 ACUTE SYSTOLIC HEART FAILURE (HCC): Status: ACTIVE | Noted: 2025-07-23

## 2025-07-23 PROBLEM — N40.0 BENIGN PROSTATIC HYPERPLASIA: Status: ACTIVE | Noted: 2025-07-23

## 2025-07-23 LAB
ANION GAP SERPL CALC-SCNC: 9 MMOL/L (ref 0–18)
APTT PPP: 32.5 SECONDS (ref 23–36)
APTT PPP: 63.8 SECONDS (ref 23–36)
BUN BLD-MCNC: 21 MG/DL (ref 9–23)
CALCIUM BLD-MCNC: 9.2 MG/DL (ref 8.7–10.6)
CHLORIDE SERPL-SCNC: 104 MMOL/L (ref 98–112)
CO2 SERPL-SCNC: 28 MMOL/L (ref 21–32)
CREAT BLD-MCNC: 1.26 MG/DL (ref 0.7–1.3)
EGFRCR SERPLBLD CKD-EPI 2021: 57 ML/MIN/1.73M2 (ref 60–?)
GLUCOSE BLD-MCNC: 105 MG/DL (ref 70–99)
OSMOLALITY SERPL CALC.SUM OF ELEC: 295 MOSM/KG (ref 275–295)
POTASSIUM SERPL-SCNC: 3.6 MMOL/L (ref 3.5–5.1)
POTASSIUM SERPL-SCNC: 4 MMOL/L (ref 3.5–5.1)
SODIUM SERPL-SCNC: 141 MMOL/L (ref 136–145)

## 2025-07-23 PROCEDURE — B246ZZ4 ULTRASONOGRAPHY OF RIGHT AND LEFT HEART, TRANSESOPHAGEAL: ICD-10-PCS | Performed by: INTERNAL MEDICINE

## 2025-07-23 PROCEDURE — 4A023N7 MEASUREMENT OF CARDIAC SAMPLING AND PRESSURE, LEFT HEART, PERCUTANEOUS APPROACH: ICD-10-PCS | Performed by: INTERNAL MEDICINE

## 2025-07-23 PROCEDURE — 99232 SBSQ HOSP IP/OBS MODERATE 35: CPT | Performed by: INTERNAL MEDICINE

## 2025-07-23 PROCEDURE — B2111ZZ FLUOROSCOPY OF MULTIPLE CORONARY ARTERIES USING LOW OSMOLAR CONTRAST: ICD-10-PCS | Performed by: INTERNAL MEDICINE

## 2025-07-23 RX ORDER — VERAPAMIL HYDROCHLORIDE 2.5 MG/ML
INJECTION INTRAVENOUS
Status: COMPLETED
Start: 2025-07-23 | End: 2025-07-23

## 2025-07-23 RX ORDER — NITROGLYCERIN 20 MG/100ML
INJECTION INTRAVENOUS
Status: COMPLETED
Start: 2025-07-23 | End: 2025-07-23

## 2025-07-23 RX ORDER — SODIUM CHLORIDE 9 MG/ML
INJECTION, SOLUTION INTRAVENOUS
Status: DISCONTINUED | OUTPATIENT
Start: 2025-07-24 | End: 2025-07-23 | Stop reason: HOSPADM

## 2025-07-23 RX ORDER — ASPIRIN 81 MG/1
324 TABLET, CHEWABLE ORAL ONCE
Status: COMPLETED | OUTPATIENT
Start: 2025-07-23 | End: 2025-07-23

## 2025-07-23 RX ORDER — POTASSIUM CHLORIDE 1500 MG/1
40 TABLET, EXTENDED RELEASE ORAL EVERY 4 HOURS
Status: DISPENSED | OUTPATIENT
Start: 2025-07-23 | End: 2025-07-23

## 2025-07-23 RX ORDER — METOPROLOL SUCCINATE 25 MG/1
25 TABLET, EXTENDED RELEASE ORAL
Status: DISCONTINUED | OUTPATIENT
Start: 2025-07-23 | End: 2025-07-24

## 2025-07-23 RX ORDER — LIDOCAINE HYDROCHLORIDE 10 MG/ML
INJECTION, SOLUTION EPIDURAL; INFILTRATION; INTRACAUDAL; PERINEURAL
Status: COMPLETED
Start: 2025-07-23 | End: 2025-07-23

## 2025-07-23 RX ORDER — HEPARIN SODIUM 1000 [USP'U]/ML
30 INJECTION, SOLUTION INTRAVENOUS; SUBCUTANEOUS ONCE
Status: COMPLETED | OUTPATIENT
Start: 2025-07-23 | End: 2025-07-23

## 2025-07-23 RX ORDER — HEPARIN SODIUM 5000 [USP'U]/ML
INJECTION, SOLUTION INTRAVENOUS; SUBCUTANEOUS
Status: COMPLETED
Start: 2025-07-23 | End: 2025-07-23

## 2025-07-23 RX ORDER — MIDAZOLAM HYDROCHLORIDE 1 MG/ML
INJECTION INTRAMUSCULAR; INTRAVENOUS
Status: COMPLETED
Start: 2025-07-23 | End: 2025-07-23

## 2025-07-23 NOTE — PLAN OF CARE
High surgical risk for surgical intervention.    Discussed potential plan of continued diuresis to euvolemia, CHRISTA for mitral valve anatomy and plan for percutaneous revascularization.    Patient and family agreeable.  Will cont to diurese, obtain CHRISTA and schedule for PCI in about a month.  I believe euvolemia, rate control and GDMT will improve MR and overall PCI risk will be improved with watchful waiting.

## 2025-07-23 NOTE — PROGRESS NOTES
[facility]   part of [facility]     Hospitalist Progress Note     Nish Oneill Patient Status:  Inpatient    1940 MRN [id number]   Location [facility] 2NE-A Attending Barbara Sher MD   Hosp Day # 1 PCP Sean Valdez MD     Chief Complaint: Shortness of breath, exertional dyspnea    Subjective:     Patient seen with patient's family at bedside.  Denies any chest pain.  Denies any palpitation.  Shortness of breath improving.  Pedal edema and abdominal wall edema persist.  Diuresing well with IV Lasix, creatinine normal.  Awaiting cardiac cath This afternoon.    Objective:    Review of Systems:   A comprehensive review of systems was completed; pertinent positive and negatives stated in subjective.    Vital signs:  Temp:  [97.3 °F (36.3 °C)-97.9 °F (36.6 °C)] 97.9 °F (36.6 °C)  Pulse:  [] 84  Resp:  [18-22] 18  BP: (104-127)/(62-88) 110/81  SpO2:  [92 %-99 %] 99 %    Physical Exam:    /81 (BP Location: Right arm)   Pulse 84   Temp 97.9 °F (36.6 °C) (Oral)   Resp 18   Ht 5' 11\" (1.803 m)   Wt 193 lb 12.6 oz (87.9 kg)   SpO2 99%   BMI 27.03 kg/m²     General: No acute distress  Respiratory: No wheezes, no rhonchi  Cardiovascular: S1, S2, irregularly irregular  Abdomen: Soft, Non-tender, non-distended, positive bowel sounds  Neuro: No new focal deficits.   Extremities: Bilateral pitting pedal edema      Diagnostic Data:    Labs:  Recent Labs   Lab 25  1134 25  1135   WBC 9.5  --    HGB 14.7  --    MCV 88.0  --    .0  --    INR  --  1.09       Recent Labs   Lab 25  1134 25  0604   * 105*   BUN 20 21   CREATSERUM 1.21 1.26   CA 9.7 9.2   ALB 4.5  --     141   K 3.9 3.6    104   CO2 26.0 28.0   ALKPHO 78  --    AST 33  --    ALT 38  --    BILT 1.0  --    TP 6.9  --        Estimated Glomerular Filtration Rate: 57 mL/min/1.73m2 (A) (result from lab).    Recent Labs   Lab 25  1134   TROPHS 26       Recent Labs   Lab  07/22/25  1135   PTP 14.2   INR 1.09                  Microbiology    No results found for this visit on 07/22/25.      Imaging: Reviewed in Epic.    Medications:    potassium chloride  40 mEq Oral Q4H    metoprolol succinate ER  25 mg Oral 2x Daily(Beta Blocker)    [START ON 7/24/2025] sodium chloride   Intravenous On Call    losartan  50 mg Oral Daily    pravastatin  20 mg Oral Nightly    furosemide  20 mg Intravenous BID (Diuretic)       Assessment & Plan:      #Exertional dyspnea, due to new onset acute systolic CHF, A fib with RVR, cardiomyopathy   echocardiogram done on 7/22/2025 showed EF of 20 to 25%, severe diffuse hypokinesis.  IV diuretics  Pro-bnp elevated  Troponin normal  Cardiology on consult  Cardiology plans cardiac cath today    #Atrial fibrillation with RVR  TSH  Echo done on 7/22/2025 showed EF of 20 to 25%, severe diffuse hypokinesis  Heparin gtt  Cardizem gtt     #HTN, controlled  Resume home meds as able    #DL, statin    #BPH, prostate cancer    Discussed with patient, patient's family at bedside.  Discussed with RN.      Barbara Sher MD    Supplementary Documentation:     Quality:  DVT Mechanical Prophylaxis:   SCDs,    DVT Pharmacologic Prophylaxis   Medication    heparin (Porcine) 11784 units/250mL infusion ACS/AFIB CONTINUOUS                Code Status: Full Code  Vazquez: No urinary catheter in place  Vazquez Duration (in days):   Central line:    JORDAN:     Discharge is dependent on: Clinical progress, cardiology clearance  At this point Mr. Oneill is expected to be discharge to: Home    The 21st Century Cures Act makes medical notes like these available to patients in the interest of transparency. Please be advised this is a medical document. Medical documents are intended to carry relevant information, facts as evident, and the clinical opinion of the practitioner. The medical note is intended as peer to peer communication and may appear blunt or direct. It is written in medical language  and may contain abbreviations or verbiage that are unfamiliar.              **Certification      PHYSICIAN Certification of Need for Inpatient Hospitalization - Initial Certification    Patient will require inpatient services that will reasonably be expected to span two midnight's based on the clinical documentation in H+P.   Based on patients current state of illness, I anticipate that, after discharge, patient will require TBD.

## 2025-07-23 NOTE — PROCEDURES
Elective cath via RRA for CMP  % mid with L>R collaterals  LM 80% ostial, 80% distal  LAD 80% prox  Cx 80% mid    TR band placed    CV surgery to see for consideration of surgical intervention

## 2025-07-23 NOTE — PLAN OF CARE
Pt. AxOx4, Spo2> 90% on RA, A-fib (Rate controlled) per telemetry, VSS, no c/o chest pain, SOB, or palpitations on assessment. Slight BENITEZ w/activity. Continent of bowel and bladder, call light placed within reach, educated on heparin/cardizem gtt. heparin currently running @ 11.5 ml/hr; and cardizem @ 10 ml/hr w/HR sitting between 80-90's, APTT therapeutic w/daily draws. Strict I&O's in place for IV lasix BID. [facility] cardiology and EDW hospitalist following. Pt. Updated on POC.    Problem: Patient/Family Goals  Goal: Patient/Family Long Term Goal  Description: Patient's Long Term Goal: stay out of hospital    Interventions:  - Attend F/U  - take medications as prescribed   - See additional Care Plan goals for specific interventions  7/22/2025 2314 by Jayme Ardon RN  Outcome: Progressing  7/22/2025 2313 by Jayme Ardon RN  Outcome: Progressing  7/22/2025 2313 by Jayme Ardon RN  Outcome: Progressing  Goal: Patient/Family Short Term Goal  Description: Patient's Short Term Goal: Discharge    Interventions:   - Heparin/Cardizem gtt  - Cards to discuss ECHO result  - IV lasix   - See additional Care Plan goals for specific interventions  7/22/2025 2314 by Jayme Ardon RN  Outcome: Progressing  7/22/2025 2313 by Jayme Arodn, RN  Outcome: Progressing  7/22/2025 2313 by Jayme Ardon RN  Outcome: Progressing     Problem: CARDIOVASCULAR - ADULT  Goal: Maintains optimal cardiac output and hemodynamic stability  Description: INTERVENTIONS:  - Monitor vital signs, rhythm, and trends  - Monitor for bleeding, hypotension and signs of decreased cardiac output  - Evaluate effectiveness of vasoactive medications to optimize hemodynamic stability  - Monitor arterial and/or venous puncture sites for bleeding and/or hematoma  - Assess quality of pulses, skin color and temperature  - Assess for signs of decreased coronary artery perfusion - ex. Angina  - Evaluate fluid balance, assess for edema, trend  weights  7/22/2025 2314 by Jayme Ardon RN  Outcome: Progressing  7/22/2025 2313 by Jayme Ardon RN  Outcome: Progressing  7/22/2025 2313 by Jayme Ardon RN  Outcome: Progressing  Goal: Absence of cardiac arrhythmias or at baseline  Description: INTERVENTIONS:  - Continuous cardiac monitoring, monitor vital signs, obtain 12 lead EKG if indicated  - Evaluate effectiveness of antiarrhythmic and heart rate control medications as ordered  - Initiate emergency measures for life threatening arrhythmias  - Monitor electrolytes and administer replacement therapy as ordered  7/22/2025 2314 by Jayme Ardon RN  Outcome: Progressing  7/22/2025 2313 by Jayme Ardon RN  Outcome: Progressing  7/22/2025 2313 by Jayme Ardon RN  Outcome: Progressing

## 2025-07-23 NOTE — DISCHARGE INSTRUCTIONS
Going Home Instructions  In this section you will find the tools which will guide you through the first few days after you leave the hospital. Continued use of these tools will help you develop the skills necessary to keep your heart failure under control.     Home Care Instructions Following Heart Failure - the most important things to do every day include:   Weigh yourself and review the “Self-Check Plan” sheet every morning.   Call your cardiologist office if you are in the “Pay Attention-Use Caution” (yellow zone) or “Medical Alert-Warning!” (red zone) as outlined in the Self-Check Plan sheet.  Take your medicines as prescribed.  Limit your sodium (salt) intake.  Know when to call your cardiologist, primary doctor, or nurse.  Know when to seek emergency care.      Things for You to Remember:   1. See your doctor or healthcare provider as written on your discharge instructions.  It is important that you attend this appointment to make sure your symptoms are under control.     2. Your recommended sodium intake is 4387-4845 mg daily.    3.  Weigh yourself every day.    4. Some exercise and activity is important to help keep your heart functioning and strong. Unless instructed not to exercise, you may walk at a slow to moderate pace for 10-15 minutes 2-3 days per week to start. Pace your activity to prevent shortness of breath or fatigue. Stop exercising if you develop chest pain, lightheadedness, or significant shortness of breath.       Call Your Cardiologist If:   You gain 2-3 pounds in one day or 5 pounds in one week.  You have more difficulty breathing.  You are getting more tired with normal activity.  You are more short of breath lying down, or awaken at night short of breath.  You have swelling of your feet or legs.  You urinate less often during the day and more often at night.  You have cramps in your legs.  You have blurred vision or see yellowish-green halos around objects of lights.    Go to the  Emergency Room If:   You have pain or tightness in your chest  You are extremely short of breath  You are coughing up pink-frothy mucus  You are traveling and develop symptoms of worsening heart failure      ** Please follow up with your cardiologist or Advanced Practice Provider as written on your discharge instructions. If you are not provided with an appointment, let your nurse know so you can get an appointment**

## 2025-07-23 NOTE — PROGRESS NOTES
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  He feels better with IV lasix and rate control.  On RA.    Medications:  Scheduled Medications[1]    Continuous Infusions:  Medication Infusions[2]      Allergies:  Allergies[3]      Intake/Output:    Intake/Output Summary (Last 24 hours) at 2025 0732  Last data filed at 2025 0450  Gross per 24 hour   Intake 440 ml   Output 2510 ml   Net -2070 ml           Last 3 Weights   25 0450 193 lb 12.6 oz (87.9 kg)   25 1451 197 lb 8.5 oz (89.6 kg)   25 1136 195 lb (88.5 kg)   25 1044 197 lb (89.4 kg)   25 1123 197 lb (89.4 kg)            Physical Exam:    Temp:  [97.3 °F (36.3 °C)-98 °F (36.7 °C)] 97.8 °F (36.6 °C)  Pulse:  [] 86  Resp:  [14-26] 20  BP: (104-141)/() 114/62  SpO2:  [92 %-98 %] 96 %    General: No apparent distress  HEENT: No focal deficits.  Neck: supple. JVP normal  Cardiac: Irregular rhythm, S1, S2 normal,  rub or gallop.  2/6 HSM apex.  Lungs: CTA  Abdomen: Soft, non-tender.   Extremities: No edema  Neurologic: no focal deficits  Skin: Warm and dry.     Telemetry: fib    EKG:      Echo:      Cardiac Cath:      Labs:  HEM:  Recent Labs   Lab 25  1134   WBC 9.5   HGB 14.7   .0       Chem:  Recent Labs   Lab 25  1134 25  0604    141   K 3.9 3.6    104   CO2 26.0 28.0   BUN 20 21   CREATSERUM 1.21 1.26   CA 9.7 9.2   MG 1.9  --    * 105*       Recent Labs   Lab 25  1134   ALT 38   AST 33   ALB 4.5       No results for input(s): \"TROP\", \"CK\" in the last 168 hours.    Recent Labs   Lab 25  1135   PTP 14.2   INR 1.09                 Impression:  Acute HFrEF - echo on 25 reviewed.  EF near 25% with severe central MR.  Likely  tachy induced, though could be primary MR.  PAF - presentation with RVR on 7/22/25.  Remains on IV diltiazem.  HTN, now low  H/o prostate CA          Plan:  Begin Toprol 25 mg BID.  Wean IV diltiazem as able.  Cardiac cath later today.  Continue IV lasix 20 mg BID.  Add further GDMT as indicated.  Plan CHRISTA tomorrow likely to assess MR.  Hopefully functional MR and no CAD, and can reassess as o/p once rate controlled, on aggressive GDMT.        Gigi Arreguin MD  7/23/2025  7:32 AM  L3           [1]    potassium chloride  40 mEq Oral Q4H    losartan  50 mg Oral Daily    pravastatin  20 mg Oral Nightly    furosemide  20 mg Intravenous BID (Diuretic)   [2]    dilTIAZem 10 mg/hr (07/22/25 2041)    continuous dose heparin 1,150 Units/hr (07/23/25 0648)   [3]   Allergies  Allergen Reactions    Ace Inhibitors Coughing

## 2025-07-23 NOTE — PLAN OF CARE
Assumed care of patient at 0730.  Alert and oriented x4.  On room air with adequate O2 saturations.  Afib on tele, rates are controlled. On cardizem gtt and heparin gtt. Weaning cardizem gtt as tolerated.  Continent of bowel and bladder.  Up ad ewelina with steady gait.  Pt updated on plan of care, verbalized understanding.     Problem: CARDIOVASCULAR - ADULT  Goal: Maintains optimal cardiac output and hemodynamic stability  Description: INTERVENTIONS:  - Monitor vital signs, rhythm, and trends  - Monitor for bleeding, hypotension and signs of decreased cardiac output  - Evaluate effectiveness of vasoactive medications to optimize hemodynamic stability  - Monitor arterial and/or venous puncture sites for bleeding and/or hematoma  - Assess quality of pulses, skin color and temperature  - Assess for signs of decreased coronary artery perfusion - ex. Angina  - Evaluate fluid balance, assess for edema, trend weights  Outcome: Progressing  Goal: Absence of cardiac arrhythmias or at baseline  Description: INTERVENTIONS:  - Continuous cardiac monitoring, monitor vital signs, obtain 12 lead EKG if indicated  - Evaluate effectiveness of antiarrhythmic and heart rate control medications as ordered  - Initiate emergency measures for life threatening arrhythmias  - Monitor electrolytes and administer replacement therapy as ordered  Outcome: Progressing     Problem: Patient/Family Goals  Goal: Patient/Family Long Term Goal  Description: Patient's Long Term Goal: stay out of hospital    Interventions:  - Attend F/U  - take medications as prescribed   - See additional Care Plan goals for specific interventions  Outcome: Progressing  Goal: Patient/Family Short Term Goal  Description: Patient's Short Term Goal: Discharge    Interventions:   - Heparin/Cardizem gtt  - Cards to discuss ECHO result  - IV lasix   - See additional Care Plan goals for specific interventions  Outcome: Progressing     Problem: PAIN - ADULT  Goal:  Verbalizes/displays adequate comfort level or patient's stated pain goal  Description: INTERVENTIONS:  - Encourage pt to monitor pain and request assistance  - Assess pain using appropriate pain scale  - Administer analgesics based on type and severity of pain and evaluate response  - Implement non-pharmacological measures as appropriate and evaluate response  - Consider cultural and social influences on pain and pain management  - Manage/alleviate anxiety  - Utilize distraction and/or relaxation techniques  - Monitor for opioid side effects  - Notify MD/LIP if interventions unsuccessful or patient reports new pain  - Anticipate increased pain with activity and pre-medicate as appropriate  Outcome: Progressing     Problem: RISK FOR INFECTION - ADULT  Goal: Absence of fever/infection during anticipated neutropenic period  Description: INTERVENTIONS  - Monitor WBC  - Administer growth factors as ordered  - Implement neutropenic guidelines  Outcome: Progressing     Problem: SAFETY ADULT - FALL  Goal: Free from fall injury  Description: INTERVENTIONS:  - Assess pt frequently for physical needs  - Identify cognitive and physical deficits and behaviors that affect risk of falls.  - [place] fall precautions as indicated by assessment.  - Educate pt/family on patient safety including physical limitations  - Instruct pt to call for assistance with activity based on assessment  - Modify environment to reduce risk of injury  - Provide assistive devices as appropriate  - Consider OT/PT consult to assist with strengthening/mobility  - Encourage toileting schedule  Outcome: Progressing     Problem: DISCHARGE PLANNING  Goal: Discharge to home or other facility with appropriate resources  Description: INTERVENTIONS:  - Identify barriers to discharge w/pt and caregiver  - Include patient/family/discharge partner in discharge planning  - Arrange for needed discharge resources and transportation as appropriate  - Identify discharge  learning needs (meds, wound care, etc)  - Arrange for interpreters to assist at discharge as needed  - Consider post-discharge preferences of patient/family/discharge partner  - Complete POLST form as appropriate  - Assess patient's ability to be responsible for managing their own health  - Refer to Case Management Department for coordinating discharge planning if the patient needs post-hospital services based on physician/LIP order or complex needs related to functional status, cognitive ability or social support system  Outcome: Progressing     Problem: Altered Communication/Language Barrier  Goal: Patient/Family is able to understand and participate in their care  Description: Interventions:  - Assess communication ability and preferred communication style  - Implement communication aides and strategies  - Use visual cues when possible  - Listen attentively, be patient, do not interrupt  - Minimize distractions  - Allow time for understanding and response  - Establish method for patient to ask for assistance (call light)  - Provide an  as needed  - Communicate barriers and strategies to overcome with those who interact with patient  Outcome: Progressing

## 2025-07-24 ENCOUNTER — APPOINTMENT (OUTPATIENT)
Dept: INTERVENTIONAL RADIOLOGY/VASCULAR | Facility: HOSPITAL | Age: 85
End: 2025-07-24
Attending: NURSE PRACTITIONER

## 2025-07-24 ENCOUNTER — APPOINTMENT (OUTPATIENT)
Dept: CV DIAGNOSTICS | Facility: HOSPITAL | Age: 85
End: 2025-07-24
Attending: NURSE PRACTITIONER

## 2025-07-24 LAB
ANION GAP SERPL CALC-SCNC: 12 MMOL/L (ref 0–18)
APTT PPP: 56.1 SECONDS (ref 23–36)
BUN BLD-MCNC: 17 MG/DL (ref 9–23)
CALCIUM BLD-MCNC: 9.3 MG/DL (ref 8.7–10.6)
CHLORIDE SERPL-SCNC: 104 MMOL/L (ref 98–112)
CO2 SERPL-SCNC: 24 MMOL/L (ref 21–32)
CREAT BLD-MCNC: 1.16 MG/DL (ref 0.7–1.3)
EGFRCR SERPLBLD CKD-EPI 2021: 62 ML/MIN/1.73M2 (ref 60–?)
GLUCOSE BLD-MCNC: 106 MG/DL (ref 70–99)
OSMOLALITY SERPL CALC.SUM OF ELEC: 292 MOSM/KG (ref 275–295)
POTASSIUM SERPL-SCNC: 3.8 MMOL/L (ref 3.5–5.1)
SODIUM SERPL-SCNC: 140 MMOL/L (ref 136–145)

## 2025-07-24 PROCEDURE — 99232 SBSQ HOSP IP/OBS MODERATE 35: CPT | Performed by: INTERNAL MEDICINE

## 2025-07-24 PROCEDURE — 93320 DOPPLER ECHO COMPLETE: CPT | Performed by: NURSE PRACTITIONER

## 2025-07-24 PROCEDURE — 93325 DOPPLER ECHO COLOR FLOW MAPG: CPT | Performed by: NURSE PRACTITIONER

## 2025-07-24 RX ORDER — FUROSEMIDE 10 MG/ML
40 INJECTION INTRAMUSCULAR; INTRAVENOUS
Status: DISCONTINUED | OUTPATIENT
Start: 2025-07-24 | End: 2025-07-25

## 2025-07-24 RX ORDER — HEPARIN SODIUM AND DEXTROSE 10000; 5 [USP'U]/100ML; G/100ML
INJECTION INTRAVENOUS
Status: COMPLETED
Start: 2025-07-24 | End: 2025-07-24

## 2025-07-24 RX ORDER — METOPROLOL SUCCINATE 50 MG/1
50 TABLET, EXTENDED RELEASE ORAL
Status: DISCONTINUED | OUTPATIENT
Start: 2025-07-24 | End: 2025-07-27

## 2025-07-24 RX ORDER — SODIUM CHLORIDE 9 MG/ML
INJECTION, SOLUTION INTRAVENOUS
Status: ACTIVE | OUTPATIENT
Start: 2025-07-25 | End: 2025-07-25

## 2025-07-24 RX ORDER — MIDAZOLAM HYDROCHLORIDE 1 MG/ML
4 INJECTION INTRAMUSCULAR; INTRAVENOUS ONCE
Status: COMPLETED | OUTPATIENT
Start: 2025-07-24 | End: 2025-07-24

## 2025-07-24 RX ORDER — MIDAZOLAM HYDROCHLORIDE 1 MG/ML
INJECTION INTRAMUSCULAR; INTRAVENOUS
Status: COMPLETED
Start: 2025-07-24 | End: 2025-07-24

## 2025-07-24 RX ORDER — POTASSIUM CHLORIDE 1500 MG/1
40 TABLET, EXTENDED RELEASE ORAL ONCE
Status: COMPLETED | OUTPATIENT
Start: 2025-07-24 | End: 2025-07-24

## 2025-07-24 NOTE — PLAN OF CARE
Pt. AxOx4, Spo2 >90% on RA (lungs clear on auscultation w/slight BENITEZ), A-fib on telemetry, VSS. Heparin gtt restarted at prior rate of 11.5, APTT set for redraw 6 hours after initiation of heparin (started 2 hrs after radial band removal @ 2045). R radial site C/D/I w/no swelling, drainage, or bruising present on assessment. POC discussed w/pt. - NPO @ 0000 for possible CHRISTA, will continue to monitor rate. [facility] cards and Edw hospitalist following.     0206: 7 beats of V tach, placed on cardizem @ 5mg/hr    0328: Cardizem gtt up titrated to 10 mg/hr for HR not remaining below or <120 (-130)    0418: Cardizem gtt up titrated to 15 mg/hr for HR not remaining below or <120 (-130)    0448: Cardizem gtt up titrated to 20 mg/hr for HR not remaining below or <120 (-130) c/o SOB, will continue to monitor and page if rate inc.    0727-0691: lowered to 15 mg/hr > 10 mg/hr      Problem: Patient/Family Goals  Goal: Patient/Family Long Term Goal  Description: Patient's Long Term Goal: stay out of hospital    Interventions:  - Attend F/U  - take medications as prescribed   - See additional Care Plan goals for specific interventions  Outcome: Progressing  Goal: Patient/Family Short Term Goal  Description: Patient's Short Term Goal: Discharge    Interventions:   - IV lasix  - Heparin gtt  - NPO for CHRISTA 7/24  - surgical c/s for MVD s/p C  - See additional Care Plan goals for specific interventions  Outcome: Progressing     Problem: CARDIOVASCULAR - ADULT  Goal: Maintains optimal cardiac output and hemodynamic stability  Description: INTERVENTIONS:  - Monitor vital signs, rhythm, and trends  - Monitor for bleeding, hypotension and signs of decreased cardiac output  - Evaluate effectiveness of vasoactive medications to optimize hemodynamic stability  - Monitor arterial and/or venous puncture sites for bleeding and/or hematoma  - Assess quality of pulses, skin color and temperature  - Assess for signs of decreased  coronary artery perfusion - ex. Angina  - Evaluate fluid balance, assess for edema, trend weights  Outcome: Progressing  Goal: Absence of cardiac arrhythmias or at baseline  Description: INTERVENTIONS:  - Continuous cardiac monitoring, monitor vital signs, obtain 12 lead EKG if indicated  - Evaluate effectiveness of antiarrhythmic and heart rate control medications as ordered  - Initiate emergency measures for life threatening arrhythmias  - Monitor electrolytes and administer replacement therapy as ordered  Outcome: Progressing

## 2025-07-24 NOTE — PLAN OF CARE
Assumed care of patient at 0730.  Alert and oriented x4.  On room air with adequate O2 saturations.  Afib on tele, rates controlled. On heparin gtt per acs/afib protocol. Cardizem gtt infusing per order.  Continent of bowel and bladder.  Up ad ewelina with steady gait.  Pt updated on plan of care, verbalized understanding.     Problem: CARDIOVASCULAR - ADULT  Goal: Maintains optimal cardiac output and hemodynamic stability  Description: INTERVENTIONS:  - Monitor vital signs, rhythm, and trends  - Monitor for bleeding, hypotension and signs of decreased cardiac output  - Evaluate effectiveness of vasoactive medications to optimize hemodynamic stability  - Monitor arterial and/or venous puncture sites for bleeding and/or hematoma  - Assess quality of pulses, skin color and temperature  - Assess for signs of decreased coronary artery perfusion - ex. Angina  - Evaluate fluid balance, assess for edema, trend weights  Outcome: Progressing  Goal: Absence of cardiac arrhythmias or at baseline  Description: INTERVENTIONS:  - Continuous cardiac monitoring, monitor vital signs, obtain 12 lead EKG if indicated  - Evaluate effectiveness of antiarrhythmic and heart rate control medications as ordered  - Initiate emergency measures for life threatening arrhythmias  - Monitor electrolytes and administer replacement therapy as ordered  Outcome: Progressing     Problem: Patient/Family Goals  Goal: Patient/Family Long Term Goal  Description: Patient's Long Term Goal: stay out of hospital    Interventions:  - Attend F/U  - take medications as prescribed   - See additional Care Plan goals for specific interventions  Outcome: Progressing  Goal: Patient/Family Short Term Goal  Description: Patient's Short Term Goal: Discharge    Interventions:   - IV lasix  - Heparin gtt  - NPO for CHRISTA 7/24  - surgical c/s for MVD s/p C  - See additional Care Plan goals for specific interventions  Outcome: Progressing     Problem: PAIN - ADULT  Goal:  Verbalizes/displays adequate comfort level or patient's stated pain goal  Description: INTERVENTIONS:  - Encourage pt to monitor pain and request assistance  - Assess pain using appropriate pain scale  - Administer analgesics based on type and severity of pain and evaluate response  - Implement non-pharmacological measures as appropriate and evaluate response  - Consider cultural and social influences on pain and pain management  - Manage/alleviate anxiety  - Utilize distraction and/or relaxation techniques  - Monitor for opioid side effects  - Notify MD/LIP if interventions unsuccessful or patient reports new pain  - Anticipate increased pain with activity and pre-medicate as appropriate  Outcome: Progressing     Problem: RISK FOR INFECTION - ADULT  Goal: Absence of fever/infection during anticipated neutropenic period  Description: INTERVENTIONS  - Monitor WBC  - Administer growth factors as ordered  - Implement neutropenic guidelines  Outcome: Progressing     Problem: SAFETY ADULT - FALL  Goal: Free from fall injury  Description: INTERVENTIONS:  - Assess pt frequently for physical needs  - Identify cognitive and physical deficits and behaviors that affect risk of falls.  - [place] fall precautions as indicated by assessment.  - Educate pt/family on patient safety including physical limitations  - Instruct pt to call for assistance with activity based on assessment  - Modify environment to reduce risk of injury  - Provide assistive devices as appropriate  - Consider OT/PT consult to assist with strengthening/mobility  - Encourage toileting schedule  Outcome: Progressing     Problem: DISCHARGE PLANNING  Goal: Discharge to home or other facility with appropriate resources  Description: INTERVENTIONS:  - Identify barriers to discharge w/pt and caregiver  - Include patient/family/discharge partner in discharge planning  - Arrange for needed discharge resources and transportation as appropriate  - Identify discharge  learning needs (meds, wound care, etc)  - Arrange for interpreters to assist at discharge as needed  - Consider post-discharge preferences of patient/family/discharge partner  - Complete POLST form as appropriate  - Assess patient's ability to be responsible for managing their own health  - Refer to Case Management Department for coordinating discharge planning if the patient needs post-hospital services based on physician/LIP order or complex needs related to functional status, cognitive ability or social support system  Outcome: Progressing     Problem: Altered Communication/Language Barrier  Goal: Patient/Family is able to understand and participate in their care  Description: Interventions:  - Assess communication ability and preferred communication style  - Implement communication aides and strategies  - Use visual cues when possible  - Listen attentively, be patient, do not interrupt  - Minimize distractions  - Allow time for understanding and response  - Establish method for patient to ask for assistance (call light)  - Provide an  as needed  - Communicate barriers and strategies to overcome with those who interact with patient  Outcome: Progressing

## 2025-07-24 NOTE — PROGRESS NOTES
[facility]  Progress Note    Nish Oneill Patient Status:  Inpatient    1940 MRN [id number]   Location [facility] 2NE-A Attending Barbara Sher MD   Hosp Day # 2 PCP Sean Valdez MD       Assessment:    Fluid overload  HFrEF  3V CAD  Severe MR  Afib    Plan:     Cont IV diuresis and increase dose  CHRISTA today  Plan for MV PCI with Impella support once optimized  Increase BB dose    Subjective:  No chest pain or shortness of breath currently. Orthopnea overnight    Objective:  /85 (BP Location: Left arm)   Pulse 81   Temp 97.5 °F (36.4 °C) (Oral)   Resp 20   Ht 5' 11\" (1.803 m)   Wt 195 lb 1.7 oz (88.5 kg)   SpO2 93%   BMI 27.21 kg/m²     Temp (24hrs), Av.9 °F (36.6 °C), Min:97.5 °F (36.4 °C), Max:98.2 °F (36.8 °C)      Tele  Afib, CVR now, RVR overnight    Intake/Output:    Intake/Output Summary (Last 24 hours) at 2025 0649  Last data filed at 2025 0418  Gross per 24 hour   Intake 485 ml   Output 550 ml   Net -65 ml       Last 3 Weights   25 0516 195 lb 1.7 oz (88.5 kg)   25 0450 193 lb 12.6 oz (87.9 kg)   25 1451 197 lb 8.5 oz (89.6 kg)   25 1136 195 lb (88.5 kg)   25 1044 197 lb (89.4 kg)   25 1123 197 lb (89.4 kg)               Physical Exam:    Gen: alert, oriented x 3, NAD  Heent/neck: no jvd  Cardiac: Irregularly irregular, normal S1,S2  Lungs: Crackles at bases  Abd: soft, NT/ND +bs  Ext: 2+ edema      Labs:  Lab Results   Component Value Date    CREATSERUM 1.16 2025    BUN 17 2025     2025    K 3.8 2025     2025    CO2 24.0 2025     2025    CA 9.3 2025    PTT 56.1 2025       Medications:    Scheduled Medications[1]  Medication Infusions[2]      Fernando Schroeder MD  2025  6:49 AM         [1]    potassium chloride  40 mEq Oral Once    metoprolol succinate ER  25 mg Oral 2x Daily(Beta Blocker)    losartan  50 mg Oral Daily    pravastatin  20 mg Oral  Nightly    furosemide  20 mg Intravenous BID (Diuretic)   [2]    dilTIAZem 15 mg/hr (07/24/25 0626)    continuous dose heparin 1,150 Units/hr (07/23/25 2051)

## 2025-07-24 NOTE — PROGRESS NOTES
Pt. With 7 beats of Vtach ; pt. asymptomatic. Denies any chest discomfort , pt. Afib on the monitor 's to 130's  ; started Cardizem 5 mg /hr. BP stable.

## 2025-07-24 NOTE — PROGRESS NOTES
[facility]   part of [facility]     Hospitalist Progress Note     Nish Oneill Patient Status:  Inpatient    1940 MRN [id number]   Location [facility] 2NE-A Attending Barbara Sher MD   Hosp Day # 2 PCP Sean Valdez MD     Chief Complaint: Shortness of breath, exertional dyspnea    Subjective:     Patient seen with patient's family at bedside.  Denies any chest pain.  Denies any palpitation.  Shortness of breath felt worse last night pedal edema and abdominal wall edema persist.  Diuresing well with IV Lasix, creatinine normal.      Objective:    Review of Systems:   A comprehensive review of systems was completed; pertinent positive and negatives stated in subjective.    Vital signs:  Temp:  [97.4 °F (36.3 °C)-98.2 °F (36.8 °C)] 97.9 °F (36.6 °C)  Pulse:  [] 97  Resp:  [14-22] 17  BP: ()/(61-95) 109/95  SpO2:  [92 %-99 %] 98 %    Physical Exam:    BP (!) 109/95   Pulse 97   Temp 97.9 °F (36.6 °C) (Oral)   Resp 17   Ht 5' 11\" (1.803 m)   Wt 195 lb 1.7 oz (88.5 kg)   SpO2 98%   BMI 27.21 kg/m²     General: No acute distress  Respiratory: No wheezes, no rhonchi  Cardiovascular: S1, S2, irregularly irregular  Abdomen: Soft, Non-tender, non-distended, positive bowel sounds  Neuro: No new focal deficits.   Extremities: Bilateral pitting pedal edema      Diagnostic Data:    Labs:  Recent Labs   Lab 25  1134 25  1135   WBC 9.5  --    HGB 14.7  --    MCV 88.0  --    .0  --    INR  --  1.09       Recent Labs   Lab 25  1134 25  0604 25  1429 25  0355   * 105*  --  106*   BUN 20 21  --  17   CREATSERUM 1.21 1.26  --  1.16   CA 9.7 9.2  --  9.3   ALB 4.5  --   --   --     141  --  140   K 3.9 3.6 4.0 3.8    104  --  104   CO2 26.0 28.0  --  24.0   ALKPHO 78  --   --   --    AST 33  --   --   --    ALT 38  --   --   --    BILT 1.0  --   --   --    TP 6.9  --   --   --        Estimated Glomerular Filtration Rate: 62  mL/min/1.73m2 (result from lab).    Recent Labs   Lab 07/22/25  1134   TROPHS 26       Recent Labs   Lab 07/22/25  1135   PTP 14.2   INR 1.09                  Microbiology    No results found for this visit on 07/22/25.      Imaging: Reviewed in Epic.    Medications:    metoprolol succinate ER  50 mg Oral 2x Daily(Beta Blocker)    furosemide  40 mg Intravenous BID (Diuretic)    [START ON 7/25/2025] sodium chloride   Intravenous On Call    [START ON 7/25/2025] sodium chloride   Intravenous On Call    benzocaine  1 spray Mouth/Throat See Admin Instructions    [START ON 7/25/2025] sodium chloride   Intravenous On Call    benzocaine  1 spray Mouth/Throat See Admin Instructions    losartan  50 mg Oral Daily    pravastatin  20 mg Oral Nightly       Assessment & Plan:      #Exertional dyspnea, due to new onset acute systolic CHF, A fib with RVR, cardiomyopathy, multivessel CAD   echocardiogram done on 7/22/2025 showed EF of 20 to 25%, severe diffuse hypokinesis.  IV diuretics  Pro-bnp elevated  Troponin normal  Cardiology on consult  S/P cardiac cath done by cardiology Dr. Schroeder on 7/23/2024 which showed multivessel disease, seen by CV surgeon also for advice that patient quite high risk for surgery and advised complex PCI if feasible, interventional cardiology following regarding this  - Status post CHRISTA done by cardiology on 7/24/2025.    #Atrial fibrillation with RVR  TSH  Echo done on 7/22/2025 showed EF of 20 to 25%, severe diffuse hypokinesis  Heparin gtt  Cardizem gtt     #HTN, controlled  Resume home meds as able    # Hyperlipidemia, dyslipidemia-continue statin    #BPH, prostate cancer    Discussed with patient, patient's family at bedside.  Discussed with RN.      Barbara Sher MD    Supplementary Documentation:     Quality:  DVT Mechanical Prophylaxis:   SCDs,    DVT Pharmacologic Prophylaxis   Medication    heparin (Porcine) 07933 units/250mL infusion ACS/AFIB CONTINUOUS                Code Status: Full  Code  Vazquez: No urinary catheter in place  Vazquez Duration (in days):   Central line:    JORDAN:     Discharge is dependent on: Clinical progress, cardiology clearance  At this point Mr. Oneill is expected to be discharge to: Home    The 21st Century Cures Act makes medical notes like these available to patients in the interest of transparency. Please be advised this is a medical document. Medical documents are intended to carry relevant information, facts as evident, and the clinical opinion of the practitioner. The medical note is intended as peer to peer communication and may appear blunt or direct. It is written in medical language and may contain abbreviations or verbiage that are unfamiliar.              **Certification      PHYSICIAN Certification of Need for Inpatient Hospitalization - Initial Certification    Patient will require inpatient services that will reasonably be expected to span two midnight's based on the clinical documentation in H+P.   Based on patients current state of illness, I anticipate that, after discharge, patient will require TBD.

## 2025-07-24 NOTE — PROGRESS NOTES
[facility] CARDIOLOGY  Preliminary CHRISTA Note    Nish Oneill Location:      MRN [id number]   Admission Date 7/22/2025 Operation Date 7/24/2025   Attending Physician Barbara Sher MD Operating Physician Matti Gallardo MD     Pre-Operative Diagnosis: CHF.  A fib.  CAD.  Mitral regurgitation.    Post-Operative Diagnosis: Same as above    Procedure Performed: CHRISTA  Conscious sedation: Versed 4 mg, Fentanyl 50 mics.  [id number].    Summary of Case: Marked LEN, LVE with severe LV dysfunction.  Mixed structural and functional severe, 4+ MR.  Full report to follow.  No immediate complications.  Full report to follow.    Matti Gallardo MD  7/24/2025  3:27 PM

## 2025-07-24 NOTE — DIETARY NOTE
Clinical Nutrition     Following a review of the patient’s current clinical condition and care plan, it has been determined that a CHF education consult is not appropriate at this time.If the patient's clinical status changes please re-consult the dietitian.     Sonido Ni RD, BRYN  Clinical Nutrition   Available via Epic secure chat         Sent to provider for review and recommendation.   Lab orders in place. Pt was notified to schedule lab draw 07/01/2021    TSH with free T4 reflex  Order: [id number]  Status:  Final result   Visible to patient:  Yes (Kathi)    Ref Range & Units 7d ago 6mo ago    TSH 0.40 - 4.00 mU/L 191.68High

## 2025-07-24 NOTE — PROGRESS NOTES
CHRISTA at bedside with Dr Gallardo. Pt tolerated procedure well. See bedside sedation record. Pt suctioned clear white secretions.     14:00: Pt awake, tolerated po intake well. Report called to Shalini MELENDEZ. Pt transported back to room in stable condition without c/o.

## 2025-07-24 NOTE — PLAN OF CARE
Attempted to see the patient today for heart failure education, but he was down for cardioversion at the time. I will re-attempt education later today once he is available.     Christine RODRIGUEZ, RN, PCCN   HF  [id number]

## 2025-07-25 PROBLEM — I34.0 MITRAL VALVE INSUFFICIENCY: Status: ACTIVE | Noted: 2025-07-25

## 2025-07-25 LAB
ANION GAP SERPL CALC-SCNC: 10 MMOL/L (ref 0–18)
APTT PPP: 64.5 SECONDS (ref 23–36)
BUN BLD-MCNC: 18 MG/DL (ref 9–23)
CALCIUM BLD-MCNC: 9.3 MG/DL (ref 8.7–10.6)
CHLORIDE SERPL-SCNC: 104 MMOL/L (ref 98–112)
CO2 SERPL-SCNC: 28 MMOL/L (ref 21–32)
CREAT BLD-MCNC: 1.18 MG/DL (ref 0.7–1.3)
EGFRCR SERPLBLD CKD-EPI 2021: 61 ML/MIN/1.73M2 (ref 60–?)
GLUCOSE BLD-MCNC: 100 MG/DL (ref 70–99)
OSMOLALITY SERPL CALC.SUM OF ELEC: 296 MOSM/KG (ref 275–295)
POTASSIUM SERPL-SCNC: 3.7 MMOL/L (ref 3.5–5.1)
POTASSIUM SERPL-SCNC: 3.7 MMOL/L (ref 3.5–5.1)
SODIUM SERPL-SCNC: 142 MMOL/L (ref 136–145)

## 2025-07-25 PROCEDURE — 99232 SBSQ HOSP IP/OBS MODERATE 35: CPT | Performed by: INTERNAL MEDICINE

## 2025-07-25 RX ORDER — AMIODARONE HYDROCHLORIDE 200 MG/1
400 TABLET ORAL 2 TIMES DAILY WITH MEALS
Status: DISCONTINUED | OUTPATIENT
Start: 2025-07-25 | End: 2025-07-27

## 2025-07-25 RX ORDER — FUROSEMIDE 10 MG/ML
80 INJECTION INTRAMUSCULAR; INTRAVENOUS
Status: DISCONTINUED | OUTPATIENT
Start: 2025-07-25 | End: 2025-07-26

## 2025-07-25 RX ORDER — POTASSIUM CHLORIDE 1500 MG/1
40 TABLET, EXTENDED RELEASE ORAL ONCE
Status: COMPLETED | OUTPATIENT
Start: 2025-07-25 | End: 2025-07-25

## 2025-07-25 NOTE — PROGRESS NOTES
Heart Failure Nurse  Progress Note    Patient was evaluated by the Heart Failure Nurse  for understanding, verbalization, demonstration, and recall of education related to heart failure, overall adherence to the behaviors necessary to maintain a compensated status, and risk for readmission.     Patient assessment: Patient is alert and oriented, lives with his wife Yari who was present for the education. Also present was patient's daughter and son in law. Patient is very active, plays golf. Patient and Yari were receptive to the education, engaged and asking questions.    Patient is able to verbalize signs/symptoms fluid overload/impending HF exacerbation and who to contact with problems                                          __X_ yes  ___ no      Patient is following a 2000 mg sodium diet                                             ___ yes  _X__ no    If no, barriers to 2000 mg sodium diet: Patient was not told to follow a low sodium diet prior to admission. Patient will follow moving forward, admits this will be hard.     Patient informed of 2-Part dietician classes that is free if sign up within 30 days of discharge or $40  __X_ yes  ___ no  Yari informed she could take the class in patient's place.     Patient is adherent to medication regimen                                              _X__ yes  ___ no    If no, barriers to medication regimen:    Patient has sufficient funds to purchase medication                      _X__ yes  ___ no      Patient has a scale in the home              _X__ yes  ___ no      Patient is adherent to daily weight monitoring                                        ___ yes   __X_ no    If no, barriers to daily weight monitoring: Patient was never told to weigh himself daily, will do so moving forward.     Symptom Tracker Worksheet reviewed with patient  _X__ yes   ___ no      Patient verbalizes understanding of stoplight/heart failure zones          _X__ yes   ___  no      Patient understands the importance of 7-day follow-up appointment      _X__ yes  ___ no    Appointment Date:          Patient has adequate transportation to attend follow-up appointments    __X_ yes  ___ no  Patient transports himself to his appointments.     If no, was referral to Social Work made  ___yes  ___ no      Family/Friend present during education: Wife Yari, daughter and son in law    Additional consultations required: Cardiac Rehab and Dietician    Char Bowers RN (signature)  Extension [phone]

## 2025-07-25 NOTE — PROGRESS NOTES
[facility]   part of [facility]     Hospitalist Progress Note     Nish Oneill Patient Status:  Inpatient    1940 MRN [id number]   [facility] 2NE-A Attending Barbara Sher MD   Hosp Day # 3 PCP Sean Valdez MD     Chief Complaint: Shortness of breath, exertional dyspnea    Subjective:     Patient seen with patient's family at bedside.  Denies any chest pain.  Denies any palpitation.  Shortness of breath , pedal edema persist.  Diuresing well with IV Lasix, creatinine normal.      Objective:    Review of Systems:   A comprehensive review of systems was completed; pertinent positive and negatives stated in subjective.    Vital signs:  Temp:  [97.4 °F (36.3 °C)-98.2 °F (36.8 °C)] 97.4 °F (36.3 °C)  Pulse:  [] 90  Resp:  [14-22] 19  BP: ()/(58-95) 116/83  SpO2:  [94 %-99 %] 98 %    Physical Exam:    /83   Pulse 90   Temp 97.4 °F (36.3 °C) (Oral)   Resp 19   Ht 5' 11\" (1.803 m)   Wt 192 lb 3.9 oz (87.2 kg)   SpO2 98%   BMI 26.81 kg/m²     General: No acute distress  Respiratory: No wheezes, no rhonchi  Cardiovascular: S1, S2, irregularly irregular  Abdomen: Soft, Non-tender, non-distended, positive bowel sounds  Neuro: No new focal deficits.   Extremities: Bilateral pitting pedal edema      Diagnostic Data:    Labs:  Recent Labs   Lab 25  1134 25  1135   WBC 9.5  --    HGB 14.7  --    MCV 88.0  --    .0  --    INR  --  1.09       Recent Labs   Lab 25  1134 25  0604 25  1429 25  0355 25  0530   * 105*  --  106* 100*   BUN 20 21  --  17 18   CREATSERUM 1.21 1.26  --  1.16 1.18   CA 9.7 9.2  --  9.3 9.3   ALB 4.5  --   --   --   --     141  --  140 142   K 3.9 3.6 4.0 3.8 3.7  3.7    104  --  104 104   CO2 26.0 28.0  --  24.0 28.0   ALKPHO 78  --   --   --   --    AST 33  --   --   --   --    ALT 38  --   --   --   --    BILT 1.0  --   --   --   --    TP 6.9  --   --   --   --         Estimated Glomerular Filtration Rate: 61 mL/min/1.73m2 (result from lab).    Recent Labs   Lab 07/22/25  1134   TROPHS 26       Recent Labs   Lab 07/22/25  1135   PTP 14.2   INR 1.09                  Microbiology    No results found for this visit on 07/22/25.      Imaging: Reviewed in Epic.    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  IV diuretics  Pro-bnp elevated  Troponin normal  Cardiology on consult  S/P cardiac cath done by cardiology Dr. Schroeder on 7/23/2024 which showed multivessel disease, seen by CV surgeon also for advice that patient quite high risk for surgery and advised complex PCI if feasible, interventional cardiology following regarding this  - Status post CHRISTA done by cardiology on 7/24/2025- Severe MR.    #Atrial fibrillation with RVR  TSH  Echo done on 7/22/2025 showed EF of 20 to 25%, severe diffuse hypokinesis  Heparin gtt  Cardizem gtt     #HTN, controlled  Resume home meds as able    # Hyperlipidemia, dyslipidemia-continue statin    #BPH, prostate cancer    Discussed with patient, patient's family at bedside.  Discussed with RN.      Barbara Sher MD    Supplementary Documentation:     Quality:  DVT Mechanical Prophylaxis:   SCDs,    DVT Pharmacologic Prophylaxis   Medication    heparin (Porcine) 27909 units/250mL infusion ACS/AFIB CONTINUOUS                 Code Status: Full Code  Vazquez: No urinary catheter in place  Vazquez Duration (in days):   Central line:    JORDAN:     Discharge is dependent on: Clinical progress, cardiology clearance  At this point Mr. Oneill is expected to be discharge to: Home    The 21st Century Cures Act makes medical notes like these available to patients in the interest of transparency. Please be advised this is a medical document. Medical documents are intended to carry relevant information, facts as evident, and the clinical opinion of the practitioner. The medical note is intended as peer to peer communication and may appear blunt or direct. It is written in medical language and may contain abbreviations or verbiage that are unfamiliar.              **Certification      PHYSICIAN Certification of Need for Inpatient Hospitalization - Initial Certification    Patient will require inpatient services that will reasonably be expected to span two midnight's based on the clinical documentation in H+P.   Based on patients current state of illness, I anticipate that, after discharge, patient will require TBD.

## 2025-07-25 NOTE — PLAN OF CARE
Pt. AxOx4, Spo2 >90% on RA lungs clear to auscultation; BENITEZ w/ambulation, A-fib per telemetry with rates in the mid 80's-90's at rest, VSS. No c/o chest pain, discomfort, or SOB. Continent of bowel and bladder, has yet to have a bowel movement since admission according to chart. Call light placed within reach, educated on appropriate usage. Cardizem gtt @ 5 mg/hr, Heparin gtt @ 11.5 ml/hr; daily APTT draws. Mild pitting in BLE near ankles; managed and responding well to IV lasix 40 BID. POC discussed w/patient and family, plan for CV surg to discuss options moving forward 7/25 in the am. [facility] cardiology and EDW Hospitalist following.     Problem: Patient/Family Goals  Goal: Patient/Family Long Term Goal  Description: Patient's Long Term Goal: stay out of hospital    Interventions:  - Attend F/U  - take medications as prescribed   - See additional Care Plan goals for specific interventions  Outcome: Progressing  Goal: Patient/Family Short Term Goal  Description: Patient's Short Term Goal: Discharge    Interventions:   - IV lasix 40 BID  - Heparin gtt 11.5 ml/hr/Cardizem gtt 5 mg/hr  - surgical c/s for MVD s/p LHC, CV surg to see tomorrow   - See additional Care Plan goals for specific interventions  Outcome: Progressing     Problem: CARDIOVASCULAR - ADULT  Goal: Maintains optimal cardiac output and hemodynamic stability  Description: INTERVENTIONS:  - Monitor vital signs, rhythm, and trends  - Monitor for bleeding, hypotension and signs of decreased cardiac output  - Evaluate effectiveness of vasoactive medications to optimize hemodynamic stability  - Monitor arterial and/or venous puncture sites for bleeding and/or hematoma  - Assess quality of pulses, skin color and temperature  - Assess for signs of decreased coronary artery perfusion - ex. Angina  - Evaluate fluid balance, assess for edema, trend weights  Outcome: Progressing  Goal: Absence of cardiac arrhythmias or at baseline  Description: INTERVENTIONS:  -  Continuous cardiac monitoring, monitor vital signs, obtain 12 lead EKG if indicated  - Evaluate effectiveness of antiarrhythmic and heart rate control medications as ordered  - Initiate emergency measures for life threatening arrhythmias  - Monitor electrolytes and administer replacement therapy as ordered  Outcome: Progressing

## 2025-07-25 NOTE — PLAN OF CARE
Assumed care of patient at 0730.  Alert and oriented x4.  On room air with adequate O2 saturations.  Afib on tele, cardizem off. Rates controlled. Heparin gtt per order. Denies pain.  Continent of bowel and bladder.  Up ad ewelina with steady gait.  Pt updated on plan of care, verbalized understanding.     Addendum:  Pt's cardizem shut off around 1050, heart rates started jumping back up to low 100s, cardiology notified and instructed to monitor for now.    Problem: CARDIOVASCULAR - ADULT  Goal: Maintains optimal cardiac output and hemodynamic stability  Description: INTERVENTIONS:  - Monitor vital signs, rhythm, and trends  - Monitor for bleeding, hypotension and signs of decreased cardiac output  - Evaluate effectiveness of vasoactive medications to optimize hemodynamic stability  - Monitor arterial and/or venous puncture sites for bleeding and/or hematoma  - Assess quality of pulses, skin color and temperature  - Assess for signs of decreased coronary artery perfusion - ex. Angina  - Evaluate fluid balance, assess for edema, trend weights  Outcome: Progressing  Goal: Absence of cardiac arrhythmias or at baseline  Description: INTERVENTIONS:  - Continuous cardiac monitoring, monitor vital signs, obtain 12 lead EKG if indicated  - Evaluate effectiveness of antiarrhythmic and heart rate control medications as ordered  - Initiate emergency measures for life threatening arrhythmias  - Monitor electrolytes and administer replacement therapy as ordered  Outcome: Progressing     Problem: Patient/Family Goals  Goal: Patient/Family Long Term Goal  Description: Patient's Long Term Goal: stay out of hospital    Interventions:  - Attend F/U  - take medications as prescribed   - See additional Care Plan goals for specific interventions  Outcome: Progressing  Goal: Patient/Family Short Term Goal  Description: Patient's Short Term Goal: Discharge    Interventions:   - IV lasix 40 BID  - Heparin gtt 11.5 ml/hr/Cardizem gtt 5 mg/hr  -  surgical c/s for MVD s/p LHC, CV surg to see tomorrow   - See additional Care Plan goals for specific interventions  Outcome: Progressing     Problem: PAIN - ADULT  Goal: Verbalizes/displays adequate comfort level or patient's stated pain goal  Description: INTERVENTIONS:  - Encourage pt to monitor pain and request assistance  - Assess pain using appropriate pain scale  - Administer analgesics based on type and severity of pain and evaluate response  - Implement non-pharmacological measures as appropriate and evaluate response  - Consider cultural and social influences on pain and pain management  - Manage/alleviate anxiety  - Utilize distraction and/or relaxation techniques  - Monitor for opioid side effects  - Notify MD/LIP if interventions unsuccessful or patient reports new pain  - Anticipate increased pain with activity and pre-medicate as appropriate  Outcome: Progressing     Problem: RISK FOR INFECTION - ADULT  Goal: Absence of fever/infection during anticipated neutropenic period  Description: INTERVENTIONS  - Monitor WBC  - Administer growth factors as ordered  - Implement neutropenic guidelines  Outcome: Progressing     Problem: SAFETY ADULT - FALL  Goal: Free from fall injury  Description: INTERVENTIONS:  - Assess pt frequently for physical needs  - Identify cognitive and physical deficits and behaviors that affect risk of falls.  - Clements fall precautions as indicated by assessment.  - Educate pt/family on patient safety including physical limitations  - Instruct pt to call for assistance with activity based on assessment  - Modify environment to reduce risk of injury  - Provide assistive devices as appropriate  - Consider OT/PT consult to assist with strengthening/mobility  - Encourage toileting schedule  Outcome: Progressing     Problem: DISCHARGE PLANNING  Goal: Discharge to home or other facility with appropriate resources  Description: INTERVENTIONS:  - Identify barriers to discharge w/pt and  caregiver  - Include patient/family/discharge partner in discharge planning  - Arrange for needed discharge resources and transportation as appropriate  - Identify discharge learning needs (meds, wound care, etc)  - Arrange for interpreters to assist at discharge as needed  - Consider post-discharge preferences of patient/family/discharge partner  - Complete POLST form as appropriate  - Assess patient's ability to be responsible for managing their own health  - Refer to Case Management Department for coordinating discharge planning if the patient needs post-hospital services based on physician/LIP order or complex needs related to functional status, cognitive ability or social support system  Outcome: Progressing     Problem: Altered Communication/Language Barrier  Goal: Patient/Family is able to understand and participate in their care  Description: Interventions:  - Assess communication ability and preferred communication style  - Implement communication aides and strategies  - Use visual cues when possible  - Listen attentively, be patient, do not interrupt  - Minimize distractions  - Allow time for understanding and response  - Establish method for patient to ask for assistance (call light)  - Provide an  as needed  - Communicate barriers and strategies to overcome with those who interact with patient  Outcome: Progressing

## 2025-07-25 NOTE — PROGRESS NOTES
Progress Note  Nish Oneill Patient Status:  Inpatient    1940 MRN [id number]   [facility] 2NE-A Attending Barbara Sher MD   Hosp Day # 3 PCP Sean Valdez MD     Subjective   Feels much better. Sob improved, BLE significantly down. Asking if he can go home today.        Objective:   /83   Pulse 90   Temp 97.4 °F (36.3 °C) (Oral)   Resp 19   Ht 5' 11\" (1.803 m)   Wt 192 lb 3.9 oz (87.2 kg)   SpO2 98%   BMI 26.81 kg/m²     Telemetry: afib, rates in 90's to low 100's     Intake/Output:    Intake/Output Summary (Last 24 hours) at 2025 0940  Last data filed at 2025 0833  Gross per 24 hour   Intake 620 ml   Output 2800 ml   Net -2180 ml       Wt Readings from Last 3 Encounters:   25 192 lb 3.9 oz (87.2 kg)   25 197 lb (89.4 kg)   25 197 lb (89.4 kg)         Labs:  Recent Labs   Lab 25  0604 25  1429 25  0355 25  0530   *  --  106* 100*   BUN 21  --  17 18   CREATSERUM 1.26  --  1.16 1.18   EGFRCR 57*  --  62 61   CA 9.2  --  9.3 9.3     --  140 142   K 3.6 4.0 3.8 3.7  3.7     --  104 104   CO2 28.0  --  24.0 28.0     Recent Labs   Lab 25  1134   RBC 4.93   HGB 14.7   HCT 43.4   MCV 88.0   MCH 29.8   MCHC 33.9   RDW 13.2   NEPRELIM 7.11   WBC 9.5   .0         Recent Labs   Lab 25  1134   TROPHS 26         Review of Systems:     10 point review of systems completed and negative except as noted in HPI      Exam:     Physical Exam:  General: Alert and oriented x 3. No apparent distress.   HEENT: Normocephalic, neck supple, no thyromegaly or adenopathy.  Neck: No JVD, carotids 2+, no bruits.  Cardiac: Irregularly irregular. Systolic murmur 3/6.   Lungs: Clear without wheezes, rales, rhonchi or dullness.  Normal excursions and effort.  Abdomen: Soft, non-tender. BS-present.  Extremities: Without clubbing or cyanosis. No edema.    Neurologic: Alert and oriented, normal affect. No focal  defects  Skin: Warm and dry.       Diagnostic Studies:     Echo 7/25/25   Conclusions:     1. Left ventricle: The cavity size was mildly increased. Wall thickness was      normal. Systolic function was markedly reduced. The estimated ejection      fraction was 20-25%, by visual assessment. There was severe diffuse      hypokinesis. Left ventricular diastolic function is indeterminate.      Doppler parameters are consistent with elevated mean left atrial filling      pressure. There was no evidence of a thrombus revealed by acoustic      contrast opacification.   2. Right ventricle: The cavity size was normal. Systolic function was low      normal.   3. Left atrium: The atrium was moderately dilated.   4. Right atrium: The atrium was moderately dilated.   5. Aortic valve: The valve was trileaflet. The leaflets were mildly      calcified. Transvalvular velocity was minimally increased. There was no      evidence for significant stenosis. The peak systolic velocity was      1.81m/sec. The mean systolic gradient was 7mm Hg.   6. Mitral valve: The leaflets were mildly thickened and mildly calcified.      There was severe4+ regurgitation.   7. Pulmonary arteries: Systolic pressure was mildly increased, in the range      of 40mm Hg to 45mm Hg.   8. Pericardium, extracardiac: A trivial pericardial effusion was identified.      There was no evidence of hemodynamic compromise. There were bilateral      pleural effusions present.   Impressions:  No previous study from [facility] was   available for comparison.     [facility] 7/23/25  % mid with L>R collaterals  LM 80% ostial, 80% distal  LAD 80% prox  Cx 80% mid    Assessment and Plan:     Assessment:  # acute HFrEF, LVEF 20-25%   New ischemic cardiomyopathy   Volume status improving with IV diuresis   GDMT: ARB, BB   # multivessel CAD as above   Plan for complex PCI vs open heart surgery ?   High intensity statin   Heparin IV   # severe MR, functional   S/p  CHRISTA with severe 4+ MR   Continue to optimize volume status   # new onset atrial fibrillation, presented with RVR   Rates controlled on diltiazem gtt, wean off as able   Heparin IV     Plan:  Plan for complex PCI vs CABG ? Will discuss with IC   Volume status improving with IV diuresis, total net neg 4.5L, wt down to 192 lbs   Transition to PO lasix this afternoon   Remains on diltiazem drip for rate control, wean off, titrate BB as bp allows   Consider DCCV given new onset and HF symptoms, CHRISTA 7/24 no clots in ROLDAN     Plan of care discussed with patient, RN.      Kadi Carmona, APRN  7/25/2025  Ph [phone] ([place])  Ph [phone] ([place])      Patient seen and examined. Agree with the findings and plan of care.  Remains fluid overloaded with orthopnea again last night and dyspnea with activity.  Cont IV diuresis  and monitor renal function.  Adding amiodarone for better rate control. No ROLDAN thrombus on CHRISTA yesterday.    Anticipate he will need IV diuresis through the weekend.  Plan for discharge with Lifevest and Protected PCI of LM/LAD/Cx/ RCA in a month.  Hoping MR improves with euvolemia and subsequent revasc

## 2025-07-25 NOTE — PROGRESS NOTES
Attempted to see patient for heart failure education, but patient's wife at bedside and patient would like her to be present for the education. Per patient, his wife will be here after 1:00 pm, will attempt to see patient as time allows.    Char Bowers RN  HF   Extension [phone]

## 2025-07-25 NOTE — PROCEDURES
[facility]    PATIENT'S NAME: JOSUE CHEATHAM   ATTENDING PHYSICIAN: Barbara Sher M.D.   OPERATING PHYSICIAN: Matti Gallardo M.D.   PATIENT ACCOUNT#:   [id number]    LOCATION:  [address] [place] [address]  MEDICAL RECORD #:   [id number]       YOB: 1940  ADMISSION DATE:       07/22/2025      OPERATION DATE:  07/24/2025    CARDIAC PROCEDURE TRANSCRIPTION      TRANSESOPHAGEAL ECHOCARDIOGRAM    PREOPERATIVE DIAGNOSIS:  Nonrheumatic mitral regurgitation.  Acute systolic congestive heart failure.  Atrial fibrillation.  Coronary artery disease.  POSTOPERATIVE DIAGNOSIS:  Nonrheumatic mitral regurgitation.  Acute systolic congestive heart failure.  Atrial fibrillation.  Coronary artery disease.  PROCEDURE PERFORMED:  Intravenous conscious sedation.  Transesophageal echocardiography probe placement.  Complete Omniplane transesophageal echocardiography with 2- and 3-dimensional imaging, Doppler and color flow mapping.    INDICATIONS:  Evaluate mitral valve anatomy and function.     SEDATION:  IV conscious sedation for the procedure consisted of Versed 4 mg and fentanyl 50 mcg.  Conscious sedation monitoring time 1453 to 1520.  The patient tolerated the CHRISTA procedure well, with no immediate complications.    PROCEDURAL COMMENTS:  Following topical Hurricaine pharyngeal anesthesia and IV conscious sedation, the CHRISTA probe was gently inserted through the retropharynx and into the esophagus and stomach.  Complete Omniplane transesophageal echocardiography was performed from the standard imaging windows.  These were of excellent quality.    FINDINGS:  The rhythm is atrial fibrillation with controlled ventricular response rate.  The left atrium is enlarged.  The left ventricle is enlarged.  Left ventricular systolic function appears moderately-severely reduced.  The visually estimated LVEF is 25% to 30%.  Right atrium is enlarged.  Right ventricular size and systolic function appear within normal limits.  No  pericardial effusion is noted.    The aortic valve is trileaflet and opens adequately in systole.  The tricuspid and pulmonic valve leaflets appear anatomically normal and have normal motion.    The mitral valve annulus is dilated.  Mitral valve leaflets are nonspecifically thickened.  There is some foreshortening and tethering of the posterior mitral valve leaflets.  Diastolic motion of the mitral valve is normal.  There are no flail or prolapsing segments.  There is minimal overriding and malcoaptation of the anterior mitral leaflet, especially in the vicinity of the A2-P2 coaptation point, predominantly on the medial side.  The bicommissural view shows 2 predominant regurgitant jets which coalesce into a single MR jet.  There is severe (4+) mitral valve regurgitation noted.  There is no mitral stenosis, with a mean diastolic gradient of 1 to 2 mmHg.  No other significant regurgitant or stenotic valvular lesions are noted.  There are no intracardiac shunting lesions noted.    Visualized portions of superior vena cava, main pulmonary artery in ascending thoracic aorta appear normal in caliber and free of significant pathology.  Diffuse atherosclerotic calcified plaquing is noted in the retrocardiac descending thoracic aorta.  Incidentally noted is a left pleural effusion.  The left atrial appendage has so-called windsock-type anatomy and appears free of masses and clots.    CONCLUSIONS:  Biatrial enlargement.  Left ventricular systolic dysfunction.  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     Dictated By Matti Gallardo M.D.  d: 07/24/2025 22:09:19  t: 07/24/2025 22:20:31  [facility] [id number]  SC/

## 2025-07-26 LAB
ANION GAP SERPL CALC-SCNC: 10 MMOL/L (ref 0–18)
ANION GAP SERPL CALC-SCNC: 9 MMOL/L (ref 0–18)
APTT PPP: 30.7 SECONDS (ref 23–36)
BUN BLD-MCNC: 21 MG/DL (ref 9–23)
BUN BLD-MCNC: 28 MG/DL (ref 9–23)
CALCIUM BLD-MCNC: 10.1 MG/DL (ref 8.7–10.6)
CALCIUM BLD-MCNC: 9.9 MG/DL (ref 8.7–10.6)
CHLORIDE SERPL-SCNC: 101 MMOL/L (ref 98–112)
CHLORIDE SERPL-SCNC: 98 MMOL/L (ref 98–112)
CO2 SERPL-SCNC: 30 MMOL/L (ref 21–32)
CO2 SERPL-SCNC: 31 MMOL/L (ref 21–32)
CREAT BLD-MCNC: 1.43 MG/DL (ref 0.7–1.3)
CREAT BLD-MCNC: 1.58 MG/DL (ref 0.7–1.3)
EGFRCR SERPLBLD CKD-EPI 2021: 43 ML/MIN/1.73M2 (ref 60–?)
EGFRCR SERPLBLD CKD-EPI 2021: 49 ML/MIN/1.73M2 (ref 60–?)
GLUCOSE BLD-MCNC: 101 MG/DL (ref 70–99)
GLUCOSE BLD-MCNC: 102 MG/DL (ref 70–99)
OSMOLALITY SERPL CALC.SUM OF ELEC: 292 MOSM/KG (ref 275–295)
OSMOLALITY SERPL CALC.SUM OF ELEC: 295 MOSM/KG (ref 275–295)
POTASSIUM SERPL-SCNC: 4 MMOL/L (ref 3.5–5.1)
POTASSIUM SERPL-SCNC: 4 MMOL/L (ref 3.5–5.1)
POTASSIUM SERPL-SCNC: 4.1 MMOL/L (ref 3.5–5.1)
SODIUM SERPL-SCNC: 138 MMOL/L (ref 136–145)
SODIUM SERPL-SCNC: 141 MMOL/L (ref 136–145)

## 2025-07-26 PROCEDURE — 99232 SBSQ HOSP IP/OBS MODERATE 35: CPT | Performed by: INTERNAL MEDICINE

## 2025-07-26 RX ORDER — LOSARTAN POTASSIUM 25 MG/1
25 TABLET ORAL DAILY
Status: DISCONTINUED | OUTPATIENT
Start: 2025-07-27 | End: 2025-07-27

## 2025-07-26 RX ORDER — AMIODARONE HYDROCHLORIDE 200 MG/1
400 TABLET ORAL ONCE
Status: COMPLETED | OUTPATIENT
Start: 2025-07-26 | End: 2025-07-26

## 2025-07-26 RX ORDER — FUROSEMIDE 10 MG/ML
40 INJECTION INTRAMUSCULAR; INTRAVENOUS
Status: DISCONTINUED | OUTPATIENT
Start: 2025-07-26 | End: 2025-07-27

## 2025-07-26 RX ORDER — ASPIRIN 81 MG/1
81 TABLET, CHEWABLE ORAL DAILY
Status: DISCONTINUED | OUTPATIENT
Start: 2025-07-26 | End: 2025-07-27

## 2025-07-26 NOTE — PROCEDURES
[facility]    PATIENT'S NAME: JOSUE CHEATHAM   ATTENDING PHYSICIAN: Barbara Sher M.D.   OPERATING PHYSICIAN: Fernando Schroeder MD   PATIENT ACCOUNT#:   [id number]    LOCATION:  [address] [place] [address]  MEDICAL RECORD #:   [id number]       YOB: 1940  ADMISSION DATE:       07/22/2025      OPERATION DATE:  07/23/2025    CARDIAC PROCEDURE TRANSCRIPTION    CARDIAC CATHETERIZATION    PREOPERATIVE DIAGNOSIS:  Cardiomyopathy.  POSTOPERATIVE DIAGNOSIS:  1.   Cardiomyopathy.  2.   Coronary artery disease.  PROCEDURE PERFORMED:  Coronary angiography.    PROCEDURAL DETAILS:  After obtaining informed consent, we obtained access in the right radial artery.  JR4 catheter was advanced over a wire and used to engage the right coronary artery.  Right coronary was dominant and was occluded in the proximal portion.  JR4 catheter was removed over a wire and placed the JL3.5.  JL3.5 catheter was used to engage the left main.  The left main was 80% to 90% stenosed throughout from the ostium to the bifurcation.  The LAD had severe disease in the proximal portion of approximately 80%.  The circumflex had a proximal 80% to 90% lesion, a mid 90% lesion, and provided epicardial collaterals to the posterior lateral branch.  There is no obvious septal perforators to the PDA.  JL3.5 catheter was removed over a wire.  TR band was placed.  The patient was transferred to Recovery in stable condition.    COMPLICATIONS:  None.    ESTIMATED BLOOD LOSS:  1 mL.    CONCLUSIONS:  1.   Severe 3-vessel coronary artery disease.  2.   Right dominant coronary tree.  3.   Severe left main coronary artery disease.    Dictated By Fernando Schroeder MD  d: 07/25/2025 14:26:20  t: 07/25/2025 19:25:45  Job [id number]  TD/

## 2025-07-26 NOTE — PLAN OF CARE
Assumed care at 0730; Pt A/o x 4, stable VS and no complaints of pain. Saturating well on RA; Plan of care discussed with patient. HR increases to the 120's when ambulating. Medication adjusted. Life vest fitting to be done. Educated on fall risk and use of call light. Belongings and call light within reach; Bed at lowest position and locked.     Problem: CARDIOVASCULAR - ADULT  Goal: Maintains optimal cardiac output and hemodynamic stability  Description: INTERVENTIONS:  - Monitor vital signs, rhythm, and trends  - Monitor for bleeding, hypotension and signs of decreased cardiac output  - Evaluate effectiveness of vasoactive medications to optimize hemodynamic stability  - Monitor arterial and/or venous puncture sites for bleeding and/or hematoma  - Assess quality of pulses, skin color and temperature  - Assess for signs of decreased coronary artery perfusion - ex. Angina  - Evaluate fluid balance, assess for edema, trend weights  Outcome: Progressing  Goal: Absence of cardiac arrhythmias or at baseline  Description: INTERVENTIONS:  - Continuous cardiac monitoring, monitor vital signs, obtain 12 lead EKG if indicated  - Evaluate effectiveness of antiarrhythmic and heart rate control medications as ordered  - Initiate emergency measures for life threatening arrhythmias  - Monitor electrolytes and administer replacement therapy as ordered  Outcome: Progressing     Problem: Patient/Family Goals  Goal: Patient/Family Long Term Goal  Description: Patient's Long Term Goal: stay out of hospital    Interventions:  - Attend F/U  - take medications as prescribed   - See additional Care Plan goals for specific interventions  Outcome: Progressing  Goal: Patient/Family Short Term Goal  Description: Patient's Short Term Goal: Discharge    Interventions:   - IV lasix 40 BID  - Heparin gtt 11.5 ml/hr/Cardizem gtt 5 mg/hr  - surgical c/s for MVD s/p LHC, CV surg to see tomorrow   - See additional Care Plan goals for specific  interventions  Outcome: Progressing     Problem: PAIN - ADULT  Goal: Verbalizes/displays adequate comfort level or patient's stated pain goal  Description: INTERVENTIONS:  - Encourage pt to monitor pain and request assistance  - Assess pain using appropriate pain scale  - Administer analgesics based on type and severity of pain and evaluate response  - Implement non-pharmacological measures as appropriate and evaluate response  - Consider cultural and social influences on pain and pain management  - Manage/alleviate anxiety  - Utilize distraction and/or relaxation techniques  - Monitor for opioid side effects  - Notify MD/LIP if interventions unsuccessful or patient reports new pain  - Anticipate increased pain with activity and pre-medicate as appropriate  Outcome: Progressing     Problem: RISK FOR INFECTION - ADULT  Goal: Absence of fever/infection during anticipated neutropenic period  Description: INTERVENTIONS  - Monitor WBC  - Administer growth factors as ordered  - Implement neutropenic guidelines  Outcome: Progressing     Problem: SAFETY ADULT - FALL  Goal: Free from fall injury  Description: INTERVENTIONS:  - Assess pt frequently for physical needs  - Identify cognitive and physical deficits and behaviors that affect risk of falls.  - [place] fall precautions as indicated by assessment.  - Educate pt/family on patient safety including physical limitations  - Instruct pt to call for assistance with activity based on assessment  - Modify environment to reduce risk of injury  - Provide assistive devices as appropriate  - Consider OT/PT consult to assist with strengthening/mobility  - Encourage toileting schedule  Outcome: Progressing     Problem: DISCHARGE PLANNING  Goal: Discharge to home or other facility with appropriate resources  Description: INTERVENTIONS:  - Identify barriers to discharge w/pt and caregiver  - Include patient/family/discharge partner in discharge planning  - Arrange for needed  discharge resources and transportation as appropriate  - Identify discharge learning needs (meds, wound care, etc)  - Arrange for interpreters to assist at discharge as needed  - Consider post-discharge preferences of patient/family/discharge partner  - Complete POLST form as appropriate  - Assess patient's ability to be responsible for managing their own health  - Refer to Case Management Department for coordinating discharge planning if the patient needs post-hospital services based on physician/LIP order or complex needs related to functional status, cognitive ability or social support system  Outcome: Progressing     Problem: Altered Communication/Language Barrier  Goal: Patient/Family is able to understand and participate in their care  Description: Interventions:  - Assess communication ability and preferred communication style  - Implement communication aides and strategies  - Use visual cues when possible  - Listen attentively, be patient, do not interrupt  - Minimize distractions  - Allow time for understanding and response  - Establish method for patient to ask for assistance (call light)  - Provide an  as needed  - Communicate barriers and strategies to overcome with those who interact with patient  Outcome: Progressing

## 2025-07-26 NOTE — CM/SW NOTE
SW noted and acknowledged orders for a wearable defibrillator.        SW sent referral and order to Ana/Laura via [employer]. Awaiting response.         to remain available for support and/or discharge planning.     Moriah Pal MARÍA ELENA  Discharge Planner  [phone]

## 2025-07-26 NOTE — PROGRESS NOTES
Progress Note  Nish D Mai Patient Status:  Inpatient    1940 MRN [id number]   [facility] 2NE-A Attending Barbara Sher MD   Hosp Day # 4 PCP Sean Valdez MD     Subjective   Feels much better. Breathing improved. Rates still borderline controlled.       Objective:   /89   Pulse 95   Temp 97.8 °F (36.6 °C) (Oral)   Resp 16   Ht 5' 11\" (1.803 m)   Wt 187 lb 2.7 oz (84.9 kg)   SpO2 95%   BMI 26.11 kg/m²     Telemetry: afib, rates in mid 110's     Intake/Output:    Intake/Output Summary (Last 24 hours) at 2025 0603  Last data filed at 2025 0406  Gross per 24 hour   Intake 540 ml   Output 3800 ml   Net -3260 ml       Wt Readings from Last 3 Encounters:   25 187 lb 2.7 oz (84.9 kg)   25 197 lb (89.4 kg)   25 197 lb (89.4 kg)         Labs:  Recent Labs   Lab 25  0604 25  1429 25  0355 25  0530   *  --  106* 100*   BUN 21  --  17 18   CREATSERUM 1.26  --  1.16 1.18   EGFRCR 57*  --  62 61   CA 9.2  --  9.3 9.3     --  140 142   K 3.6 4.0 3.8 3.7  3.7     --  104 104   CO2 28.0  --  24.0 28.0     Recent Labs   Lab 25  1134   RBC 4.93   HGB 14.7   HCT 43.4   MCV 88.0   MCH 29.8   MCHC 33.9   RDW 13.2   NEPRELIM 7.11   WBC 9.5   .0         Recent Labs   Lab 25  1134   TROPHS 26         Review of Systems:     10 point review of systems completed and negative except as noted in HPI      Exam:     Physical Exam:  General: Alert and oriented x 3. No apparent distress.   HEENT: Normocephalic, neck supple, no thyromegaly or adenopathy.  Neck: No JVD, carotids 2+, no bruits.  Cardiac: Irregularly irregular. Systolic murmur 3/6.   Lungs: Clear without wheezes, rales, rhonchi or dullness.  Normal excursions and effort.  Abdomen: Soft, non-tender. BS-present.  Extremities: Without clubbing or cyanosis. No edema.    Neurologic: Alert and oriented, normal affect. No focal defects  Skin: Warm and dry.        Diagnostic Studies:     Echo 7/25/25   Conclusions:     1. Left ventricle: The cavity size was mildly increased. Wall thickness was      normal. Systolic function was markedly reduced. The estimated ejection      fraction was 20-25%, by visual assessment. There was severe diffuse      hypokinesis. Left ventricular diastolic function is indeterminate.      Doppler parameters are consistent with elevated mean left atrial filling      pressure. There was no evidence of a thrombus revealed by acoustic      contrast opacification.   2. Right ventricle: The cavity size was normal. Systolic function was low      normal.   3. Left atrium: The atrium was moderately dilated.   4. Right atrium: The atrium was moderately dilated.   5. Aortic valve: The valve was trileaflet. The leaflets were mildly      calcified. Transvalvular velocity was minimally increased. There was no      evidence for significant stenosis. The peak systolic velocity was      1.81m/sec. The mean systolic gradient was 7mm Hg.   6. Mitral valve: The leaflets were mildly thickened and mildly calcified.      There was severe4+ regurgitation.   7. Pulmonary arteries: Systolic pressure was mildly increased, in the range      of 40mm Hg to 45mm Hg.   8. Pericardium, extracardiac: A trivial pericardial effusion was identified.      There was no evidence of hemodynamic compromise. There were bilateral      pleural effusions present.   Impressions:  No previous study from [facility] was   available for comparison.     [facility] 7/23/25  % mid with L>R collaterals  LM 80% ostial, 80% distal  LAD 80% prox  Cx 80% mid    Assessment and Plan:     Assessment:  # acute HFrEF, LVEF 20-25%   New ischemic cardiomyopathy   Volume status improving with IV diuresis   GDMT: ARB, BB   # multivessel CAD as above   Plan for complex PCI next month   High intensity statin, aspirin   # severe MR, functional   S/p CHRISTA with severe 4+ MR   Continue to optimize  volume status   # new onset atrial fibrillation, presented with RVR   PO Amiodarone load 400 mg bid for better rate control, No ROLDAN thrombus on CHRISTA 7/24, has been on heparin IV since admission, now transitioned to Eliquis 5 mg bid     Plan:  Plan for complex PCI next month with Dr. Bullock   Volume status improving with IV diuresis, total net neg 7.9L, wt down to 187 lbs, will likely transition to PO this afternoon if no exertional dyspnea    PO amiodarone 400 mg bid x7 days then 200 mg daily, continue BB for rate control, eliquis   Life vest at discharge, will get fitted today   Discharge planning     Plan of care discussed with patient, RN.      Kadi Carmona, APRN  7/26/2025  Ph [phone] ([place])  Ph [phone] ([place])

## 2025-07-26 NOTE — DIETARY NOTE
[facility]   part of [facility]   CLINICAL NUTRITION    Nish Oneill     Admitting diagnosis:  Atrial fibrillation with controlled ventricular response (HCC) [I48.91]  Acute on chronic congestive heart failure, unspecified heart failure type (HCC) [I50.9]    Ht: 180.3 cm (5' 11\")  Wt: 84.9 kg (187 lb 2.7 oz).   Body mass index is 26.11 kg/m².  IBW: 78.2kg    Wt Readings from Last 6 Encounters:   07/26/25 84.9 kg (187 lb 2.7 oz)   06/03/25 89.4 kg (197 lb)   04/14/25 89.4 kg (197 lb)   10/28/24 87.8 kg (193 lb 9.6 oz)   04/22/24 89.8 kg (198 lb)   05/11/23 90.3 kg (199 lb)        Labs/Meds reviewed    Diet:       Procedures    Cardiac diet Cardiac; Sodium Restriction: 2 GM NA; Fluid Restriction: 2000 ml; Is Patient on Accuchecks? No     Percent Meals Eaten (last 3 days)       Date/Time Percent Meals Eaten (%)    07/23/25 1239 0 %    07/23/25 1900 100 %    07/25/25 0833 360 %    07/25/25 1402 100 %    07/26/25 0815 100 %        Dietitian consult received per CHF protocol for diet education. Provided and reviewed handouts. Dicussed tips to reduce sodium, fluid guidelines, foods to avoid, seasoning without salt and dining out. Pt receptive and expect compliance. All questions answered.     Patient reports good appetite at this time.  Nursing notes reports Percent Meals Eaten (%): 100 % intake for last meal.  Tolerating po diet without diarrhea, emesis, or constipation.   No significant weight changes noted.     Patient is at low nutrition risk at this time.    Please consult if patient status changes or nutrition issues arise.    Brenda Saldivar MS RD LDN  [phone]

## 2025-07-26 NOTE — PROGRESS NOTES
[facility]   part of [facility]     Hospitalist Progress Note     Nish Oneill Patient Status:  Inpatient    1940 MRN [id number]   Location [facility] 2NE-A Attending Barbara Sher MD   Hosp Day # 4 PCP Sean Valdez MD     Chief Complaint: Shortness of breath, exertional dyspnea    Subjective:     Patient feeling better today.  Denies any chest pain.  Denies any palpitation.  Shortness of breath , pedal edema persist.  Diuresing well with IV Lasix, creatinine normal.  Getting fitted for LifeVest today, amiodarone being adjusted by cardiology.    Objective:    Review of Systems:   A comprehensive review of systems was completed; pertinent positive and negatives stated in subjective.    Vital signs:  Temp:  [97.6 °F (36.4 °C)-97.9 °F (36.6 °C)] 97.9 °F (36.6 °C)  Pulse:  [] 93  Resp:  [16-18] 16  BP: (100-126)/(64-89) 112/79  SpO2:  [89 %-98 %] 95 %    Physical Exam:    /79 (BP Location: Left arm)   Pulse 93   Temp 97.9 °F (36.6 °C) (Oral)   Resp 16   Ht 5' 11\" (1.803 m)   Wt 187 lb 2.7 oz (84.9 kg)   SpO2 95%   BMI 26.11 kg/m²     General: No acute distress  Respiratory: No wheezes, no rhonchi  Cardiovascular: S1, S2, irregularly irregular  Abdomen: Soft, Non-tender, non-distended, positive bowel sounds  Neuro: No new focal deficits.   Extremities: Bilateral pitting pedal edema      Diagnostic Data:    Labs:  Recent Labs   Lab 25  1134 25  1135   WBC 9.5  --    HGB 14.7  --    MCV 88.0  --    .0  --    INR  --  1.09       Recent Labs   Lab 25  1134 25  0604 25  0355 25  0530 25  0615   *   < > 106* 100* 101*   BUN 20   < > 17 18 21   CREATSERUM 1.21   < > 1.16 1.18 1.43*   CA 9.7   < > 9.3 9.3 9.9   ALB 4.5  --   --   --   --       < > 140 142 141   K 3.9   < > 3.8 3.7  3.7 4.0  4.0      < > 104 104 101   CO2 26.0   < > 24.0 28.0 31.0   ALKPHO 78  --   --   --   --    AST 33  --   --   --   --     ALT 38  --   --   --   --    BILT 1.0  --   --   --   --    TP 6.9  --   --   --   --     < > = values in this interval not displayed.       Estimated Glomerular Filtration Rate: 49 mL/min/1.73m2 (A) (result from lab).    Recent Labs   Lab 07/22/25  1134   TROPHS 26       Recent Labs   Lab 07/22/25  1135   PTP 14.2   INR 1.09                  Microbiology    No results found for this visit on 07/22/25.      Imaging: Reviewed in Epic.    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  IV diuretics  Pro-bnp elevated  Troponin normal  Cardiology on consult  S/P cardiac cath done by cardiology Dr. Schroeder on 7/23/2024 which showed multivessel disease, seen by CV surgeon also for advice that patient quite high risk for surgery and advised complex PCI if feasible, interventional cardiology following regarding this  - Status post CHRISTA done by cardiology on 7/24/2025- Severe MR.    #Atrial fibrillation with RVR  TSH  Echo done on 7/22/2025 showed EF of 20 to 25%, severe diffuse hypokinesis  Heparin gtt  Cardizem gtt     #HTN, controlled  Resume home meds as able    # Hyperlipidemia, dyslipidemia-continue statin    #BPH, prostate cancer    Discussed with patient,   Discussed with RN.  Discharge planning later today versus tomorrow per cardiology, LifeVest being fitted today, amiodarone being  adjusted by cardiologist.  Cardiac medications at the time of discharge per cardiologist.  Follow-up with cardiologist as directed and with regular outpatient primary care physician within 1 week in office    Barbara Sher MD    Supplementary Documentation:     Quality:  DVT Mechanical Prophylaxis:   SCDs,    DVT Pharmacologic Prophylaxis   Medication    apixaban (Eliquis) tab 5 mg      DVT Pharmacologic prophylaxis: Aspirin 162 mg         Code Status: Full Code  Vazquez: No urinary catheter in place  Vazquez Duration (in days):   Central line:    JORDAN:     Discharge is dependent on: Clinical progress, cardiology clearance  At this point Mr. Oneill is expected to be discharge to: Home    The 21st Century Cures Act makes medical notes like these available to patients in the interest of transparency. Please be advised this is a medical document. Medical documents are intended to carry relevant information, facts as evident, and the clinical opinion of the practitioner. The medical note is intended as peer to peer communication and may appear blunt or direct. It is written in medical language and may contain abbreviations or verbiage that are unfamiliar.              **Certification      PHYSICIAN Certification of Need for Inpatient Hospitalization - Initial Certification    Patient will require inpatient services that will reasonably be expected to span two midnight's based on the clinical documentation in H+P.   Based on patients current state of illness, I anticipate that, after discharge, patient will require TBD.

## 2025-07-26 NOTE — PLAN OF CARE
A&Ox4. RA. Clear lung sounds. . No shortness of breath. Afib on telemetry. Patient denies chest pain. Patient denies chest pain or shortness of breath. Cardiac diet. Continent of bowel and bladder. No complaints of pain. Up SBA overnight. Pt declining bed alarms overnight. Patient educated on the importance of bed alarms and calling prior to exiting the bed overnight. Discussed plan of care with patient. Patient verbalizes understanding.    Plan: Eliquis, IV lasix BID, strict I/O, monitor on tele.     Problem: CARDIOVASCULAR - ADULT  Goal: Maintains optimal cardiac output and hemodynamic stability  Description: INTERVENTIONS:  - Monitor vital signs, rhythm, and trends  - Monitor for bleeding, hypotension and signs of decreased cardiac output  - Evaluate effectiveness of vasoactive medications to optimize hemodynamic stability  - Monitor arterial and/or venous puncture sites for bleeding and/or hematoma  - Assess quality of pulses, skin color and temperature  - Assess for signs of decreased coronary artery perfusion - ex. Angina  - Evaluate fluid balance, assess for edema, trend weights  Outcome: Progressing  Goal: Absence of cardiac arrhythmias or at baseline  Description: INTERVENTIONS:  - Continuous cardiac monitoring, monitor vital signs, obtain 12 lead EKG if indicated  - Evaluate effectiveness of antiarrhythmic and heart rate control medications as ordered  - Initiate emergency measures for life threatening arrhythmias  - Monitor electrolytes and administer replacement therapy as ordered  Outcome: Progressing     Problem: Patient/Family Goals  Goal: Patient/Family Long Term Goal  Description: Patient's Long Term Goal: stay out of hospital    Interventions:  - Attend F/U  - take medications as prescribed   - See additional Care Plan goals for specific interventions  Outcome: Progressing  Goal: Patient/Family Short Term Goal  Description: Patient's Short Term Goal: Discharge    Interventions:   - IV lasix 80  BID  - Eliquis  - surgical c/s for MVD   - See additional Care Plan goals for specific interventions  Outcome: Progressing    Problem: SAFETY ADULT - FALL  Goal: Free from fall injury  Description: INTERVENTIONS:  - Assess pt frequently for physical needs  - Identify cognitive and physical deficits and behaviors that affect risk of falls.  - [place] fall precautions as indicated by assessment.  - Educate pt/family on patient safety including physical limitations  - Instruct pt to call for assistance with activity based on assessment  - Modify environment to reduce risk of injury  - Provide assistive devices as appropriate  - Consider OT/PT consult to assist with strengthening/mobility  - Encourage toileting schedule  Outcome: Progressing

## 2025-07-27 LAB
ANION GAP SERPL CALC-SCNC: 9 MMOL/L (ref 0–18)
BASOPHILS # BLD AUTO: 0.01 X10(3) UL (ref 0–0.2)
BASOPHILS NFR BLD AUTO: 0.1 %
BUN BLD-MCNC: 26 MG/DL (ref 9–23)
CALCIUM BLD-MCNC: 9.2 MG/DL (ref 8.7–10.6)
CHLORIDE SERPL-SCNC: 102 MMOL/L (ref 98–112)
CO2 SERPL-SCNC: 29 MMOL/L (ref 21–32)
CREAT BLD-MCNC: 1.38 MG/DL (ref 0.7–1.3)
EGFRCR SERPLBLD CKD-EPI 2021: 51 ML/MIN/1.73M2 (ref 60–?)
EOSINOPHIL # BLD AUTO: 0.17 X10(3) UL (ref 0–0.7)
EOSINOPHIL NFR BLD AUTO: 2 %
ERYTHROCYTE [DISTWIDTH] IN BLOOD BY AUTOMATED COUNT: 13.1 %
GLUCOSE BLD-MCNC: 110 MG/DL (ref 70–99)
HCT VFR BLD AUTO: 42.3 % (ref 39–53)
HGB BLD-MCNC: 14.2 G/DL (ref 13–17.5)
IMM GRANULOCYTES # BLD AUTO: 0.02 X10(3) UL (ref 0–1)
IMM GRANULOCYTES NFR BLD: 0.2 %
LYMPHOCYTES # BLD AUTO: 1.11 X10(3) UL (ref 1–4)
LYMPHOCYTES NFR BLD AUTO: 13.1 %
MAGNESIUM SERPL-MCNC: 1.9 MG/DL (ref 1.6–2.6)
MCH RBC QN AUTO: 29.5 PG (ref 26–34)
MCHC RBC AUTO-ENTMCNC: 33.6 G/DL (ref 31–37)
MCV RBC AUTO: 87.9 FL (ref 80–100)
MONOCYTES # BLD AUTO: 0.73 X10(3) UL (ref 0.1–1)
MONOCYTES NFR BLD AUTO: 8.6 %
NEUTROPHILS # BLD AUTO: 6.43 X10 (3) UL (ref 1.5–7.7)
NEUTROPHILS # BLD AUTO: 6.43 X10(3) UL (ref 1.5–7.7)
NEUTROPHILS NFR BLD AUTO: 76 %
NT-PROBNP SERPL-MCNC: 2161 PG/ML (ref ?–450)
OSMOLALITY SERPL CALC.SUM OF ELEC: 295 MOSM/KG (ref 275–295)
PLATELET # BLD AUTO: 226 10(3)UL (ref 150–450)
POTASSIUM SERPL-SCNC: 3.5 MMOL/L (ref 3.5–5.1)
RBC # BLD AUTO: 4.81 X10(6)UL (ref 3.8–5.8)
SODIUM SERPL-SCNC: 140 MMOL/L (ref 136–145)
WBC # BLD AUTO: 8.5 X10(3) UL (ref 4–11)

## 2025-07-27 PROCEDURE — 99239 HOSP IP/OBS DSCHRG MGMT >30: CPT | Performed by: INTERNAL MEDICINE

## 2025-07-27 RX ORDER — AMIODARONE HYDROCHLORIDE 200 MG/1
200 TABLET ORAL DAILY
Qty: 30 TABLET | Refills: 0 | Status: SHIPPED | OUTPATIENT
Start: 2025-08-02

## 2025-07-27 RX ORDER — FUROSEMIDE 20 MG/1
20 TABLET ORAL 2 TIMES DAILY
Qty: 60 TABLET | Refills: 0 | Status: SHIPPED | OUTPATIENT
Start: 2025-07-27

## 2025-07-27 RX ORDER — ASPIRIN 81 MG/1
81 TABLET, CHEWABLE ORAL DAILY
Qty: 30 TABLET | Refills: 0 | Status: SHIPPED | OUTPATIENT
Start: 2025-07-28

## 2025-07-27 RX ORDER — METOPROLOL SUCCINATE 50 MG/1
50 TABLET, EXTENDED RELEASE ORAL
Qty: 60 TABLET | Refills: 0 | Status: SHIPPED | OUTPATIENT
Start: 2025-07-27

## 2025-07-27 RX ORDER — POTASSIUM CHLORIDE 1500 MG/1
40 TABLET, EXTENDED RELEASE ORAL EVERY 4 HOURS
Status: DISCONTINUED | OUTPATIENT
Start: 2025-07-27 | End: 2025-07-27

## 2025-07-27 RX ORDER — POTASSIUM CHLORIDE 1.5 G/1.58G
20 POWDER, FOR SOLUTION ORAL 2 TIMES DAILY
Qty: 60 EACH | Refills: 0 | Status: SHIPPED | OUTPATIENT
Start: 2025-07-27

## 2025-07-27 RX ORDER — MAGNESIUM OXIDE 400 MG/1
200 TABLET ORAL 2 TIMES DAILY WITH MEALS
Status: DISCONTINUED | OUTPATIENT
Start: 2025-07-27 | End: 2025-07-27

## 2025-07-27 RX ORDER — AMIODARONE HYDROCHLORIDE 400 MG/1
400 TABLET ORAL 2 TIMES DAILY WITH MEALS
Qty: 10 TABLET | Refills: 0 | Status: SHIPPED | OUTPATIENT
Start: 2025-07-27 | End: 2025-08-01

## 2025-07-27 RX ORDER — LOSARTAN POTASSIUM 25 MG/1
25 TABLET ORAL DAILY
Status: SHIPPED | COMMUNITY
Start: 2025-07-28

## 2025-07-27 NOTE — PLAN OF CARE
Assumed care of pt at 1930.  A&O x4. Denies pain.  O2 sat 95% on RA.  Afib on tele.  Continent of B&B.  Right wrist covered with bandaid. CDI, soft, no hematoma, nontender, radial pulse +2.  Activity level up ad ewelina.  Call light & belongings within reach. Bed in lowest position with alarm on and wheels locked. Updated on POC & verbalized understanding.    POC:  - Discharge planning      Problem: CARDIOVASCULAR - ADULT  Goal: Maintains optimal cardiac output and hemodynamic stability  Description: INTERVENTIONS:  - Monitor vital signs, rhythm, and trends  - Monitor for bleeding, hypotension and signs of decreased cardiac output  - Evaluate effectiveness of vasoactive medications to optimize hemodynamic stability  - Monitor arterial and/or venous puncture sites for bleeding and/or hematoma  - Assess quality of pulses, skin color and temperature  - Assess for signs of decreased coronary artery perfusion - ex. Angina  - Evaluate fluid balance, assess for edema, trend weights  Outcome: Progressing  Goal: Absence of cardiac arrhythmias or at baseline  Description: INTERVENTIONS:  - Continuous cardiac monitoring, monitor vital signs, obtain 12 lead EKG if indicated  - Evaluate effectiveness of antiarrhythmic and heart rate control medications as ordered  - Initiate emergency measures for life threatening arrhythmias  - Monitor electrolytes and administer replacement therapy as ordered  Outcome: Progressing     Problem: Patient/Family Goals  Goal: Patient/Family Long Term Goal  Description: Patient's Long Term Goal: stay out of hospital    Interventions:  - Attend F/U  - take medications as prescribed   - See additional Care Plan goals for specific interventions  Outcome: Progressing  Goal: Patient/Family Short Term Goal  Description: Patient's Short Term Goal: Discharge    Interventions:   - IV lasix 40 BID  - Heparin gtt 11.5 ml/hr/Cardizem gtt 5 mg/hr  - surgical c/s for MVD s/p LHC, CV surg to see tomorrow   - See  additional Care Plan goals for specific interventions  Outcome: Progressing     Problem: PAIN - ADULT  Goal: Verbalizes/displays adequate comfort level or patient's stated pain goal  Description: INTERVENTIONS:  - Encourage pt to monitor pain and request assistance  - Assess pain using appropriate pain scale  - Administer analgesics based on type and severity of pain and evaluate response  - Implement non-pharmacological measures as appropriate and evaluate response  - Consider cultural and social influences on pain and pain management  - Manage/alleviate anxiety  - Utilize distraction and/or relaxation techniques  - Monitor for opioid side effects  - Notify MD/LIP if interventions unsuccessful or patient reports new pain  - Anticipate increased pain with activity and pre-medicate as appropriate  Outcome: Progressing     Problem: RISK FOR INFECTION - ADULT  Goal: Absence of fever/infection during anticipated neutropenic period  Description: INTERVENTIONS  - Monitor WBC  - Administer growth factors as ordered  - Implement neutropenic guidelines  Outcome: Progressing     Problem: SAFETY ADULT - FALL  Goal: Free from fall injury  Description: INTERVENTIONS:  - Assess pt frequently for physical needs  - Identify cognitive and physical deficits and behaviors that affect risk of falls.  - [place] fall precautions as indicated by assessment.  - Educate pt/family on patient safety including physical limitations  - Instruct pt to call for assistance with activity based on assessment  - Modify environment to reduce risk of injury  - Provide assistive devices as appropriate  - Consider OT/PT consult to assist with strengthening/mobility  - Encourage toileting schedule  Outcome: Progressing     Problem: DISCHARGE PLANNING  Goal: Discharge to home or other facility with appropriate resources  Description: INTERVENTIONS:  - Identify barriers to discharge w/pt and caregiver  - Include patient/family/discharge partner in  discharge planning  - Arrange for needed discharge resources and transportation as appropriate  - Identify discharge learning needs (meds, wound care, etc)  - Arrange for interpreters to assist at discharge as needed  - Consider post-discharge preferences of patient/family/discharge partner  - Complete POLST form as appropriate  - Assess patient's ability to be responsible for managing their own health  - Refer to Case Management Department for coordinating discharge planning if the patient needs post-hospital services based on physician/LIP order or complex needs related to functional status, cognitive ability or social support system  Outcome: Progressing     Problem: Altered Communication/Language Barrier  Goal: Patient/Family is able to understand and participate in their care  Description: Interventions:  - Assess communication ability and preferred communication style  - Implement communication aides and strategies  - Use visual cues when possible  - Listen attentively, be patient, do not interrupt  - Minimize distractions  - Allow time for understanding and response  - Establish method for patient to ask for assistance (call light)  - Provide an  as needed  - Communicate barriers and strategies to overcome with those who interact with patient  Outcome: Progressing

## 2025-07-27 NOTE — PLAN OF CARE
Assumed care at 0730; Pt A/o x 4, stable VS and no complaints of pain. Saturating well on RA; Plan of care discussed with patient. Pending possible discharge today. Walks completed. HR controlled. Lytes replaced. Educated on fall risk and use of call light. Belongings and call light within reach. Bed at lowest position and locked.     1510: Pt cleared by attending and consults. Tele monitor and IV line removed. Discharge papers, education sheets and belongings sent home with patient. Life vest taken home with patient as well.     Problem: CARDIOVASCULAR - ADULT  Goal: Maintains optimal cardiac output and hemodynamic stability  Description: INTERVENTIONS:  - Monitor vital signs, rhythm, and trends  - Monitor for bleeding, hypotension and signs of decreased cardiac output  - Evaluate effectiveness of vasoactive medications to optimize hemodynamic stability  - Monitor arterial and/or venous puncture sites for bleeding and/or hematoma  - Assess quality of pulses, skin color and temperature  - Assess for signs of decreased coronary artery perfusion - ex. Angina  - Evaluate fluid balance, assess for edema, trend weights  Outcome: Progressing  Goal: Absence of cardiac arrhythmias or at baseline  Description: INTERVENTIONS:  - Continuous cardiac monitoring, monitor vital signs, obtain 12 lead EKG if indicated  - Evaluate effectiveness of antiarrhythmic and heart rate control medications as ordered  - Initiate emergency measures for life threatening arrhythmias  - Monitor electrolytes and administer replacement therapy as ordered  Outcome: Progressing     Problem: Patient/Family Goals  Goal: Patient/Family Long Term Goal  Description: Patient's Long Term Goal: stay out of hospital    Interventions:  - Attend F/U  - take medications as prescribed   - See additional Care Plan goals for specific interventions  Outcome: Progressing  Goal: Patient/Family Short Term Goal  Description: Patient's Short Term Goal:  Discharge    Interventions:   - IV lasix 40 BID  - Heparin gtt 11.5 ml/hr/Cardizem gtt 5 mg/hr  - surgical c/s for MVD s/p LHC, CV surg to see tomorrow   - See additional Care Plan goals for specific interventions  Outcome: Progressing     Problem: PAIN - ADULT  Goal: Verbalizes/displays adequate comfort level or patient's stated pain goal  Description: INTERVENTIONS:  - Encourage pt to monitor pain and request assistance  - Assess pain using appropriate pain scale  - Administer analgesics based on type and severity of pain and evaluate response  - Implement non-pharmacological measures as appropriate and evaluate response  - Consider cultural and social influences on pain and pain management  - Manage/alleviate anxiety  - Utilize distraction and/or relaxation techniques  - Monitor for opioid side effects  - Notify MD/LIP if interventions unsuccessful or patient reports new pain  - Anticipate increased pain with activity and pre-medicate as appropriate  Outcome: Progressing     Problem: RISK FOR INFECTION - ADULT  Goal: Absence of fever/infection during anticipated neutropenic period  Description: INTERVENTIONS  - Monitor WBC  - Administer growth factors as ordered  - Implement neutropenic guidelines  Outcome: Progressing     Problem: SAFETY ADULT - FALL  Goal: Free from fall injury  Description: INTERVENTIONS:  - Assess pt frequently for physical needs  - Identify cognitive and physical deficits and behaviors that affect risk of falls.  - [place] fall precautions as indicated by assessment.  - Educate pt/family on patient safety including physical limitations  - Instruct pt to call for assistance with activity based on assessment  - Modify environment to reduce risk of injury  - Provide assistive devices as appropriate  - Consider OT/PT consult to assist with strengthening/mobility  - Encourage toileting schedule  Outcome: Progressing     Problem: DISCHARGE PLANNING  Goal: Discharge to home or other facility with  appropriate resources  Description: INTERVENTIONS:  - Identify barriers to discharge w/pt and caregiver  - Include patient/family/discharge partner in discharge planning  - Arrange for needed discharge resources and transportation as appropriate  - Identify discharge learning needs (meds, wound care, etc)  - Arrange for interpreters to assist at discharge as needed  - Consider post-discharge preferences of patient/family/discharge partner  - Complete POLST form as appropriate  - Assess patient's ability to be responsible for managing their own health  - Refer to Case Management Department for coordinating discharge planning if the patient needs post-hospital services based on physician/LIP order or complex needs related to functional status, cognitive ability or social support system  Outcome: Progressing     Problem: Altered Communication/Language Barrier  Goal: Patient/Family is able to understand and participate in their care  Description: Interventions:  - Assess communication ability and preferred communication style  - Implement communication aides and strategies  - Use visual cues when possible  - Listen attentively, be patient, do not interrupt  - Minimize distractions  - Allow time for understanding and response  - Establish method for patient to ask for assistance (call light)  - Provide an  as needed  - Communicate barriers and strategies to overcome with those who interact with patient  Outcome: Progressing

## 2025-07-27 NOTE — PROGRESS NOTES
[facility]   part of [facility]     Hospitalist Progress Note     Nish Oneill Patient Status:  Inpatient    1940 MRN [id number]   Location [facility] 2NE-A Attending Barbara Sher MD   Hosp Day # 5 PCP Sean Valdez MD     Chief Complaint: Shortness of breath, exertional dyspnea    Subjective:     Patient feeling better today.  Denies any chest pain.  Denies any palpitation.  Shortness of breath , pedal edema improving.  Fitted for LifeVest last night and cleared for discharge today by cardiology.    Objective:    Review of Systems:   A comprehensive review of systems was completed; pertinent positive and negatives stated in subjective.    Vital signs:  Temp:  [97.7 °F (36.5 °C)-98.2 °F (36.8 °C)] 97.8 °F (36.6 °C)  Pulse:  [] 101  Resp:  [16-19] 19  BP: ()/(67-97) 104/90  SpO2:  [92 %-96 %] 95 %    Physical Exam:    /90 (BP Location: Left arm)   Pulse 101   Temp 97.8 °F (36.6 °C) (Oral)   Resp 19   Ht 5' 11\" (1.803 m)   Wt 186 lb 1.1 oz (84.4 kg)   SpO2 95%   BMI 25.95 kg/m²     General: No acute distress  Respiratory: No wheezes, no rhonchi  Cardiovascular: S1, S2, irregularly irregular  Abdomen: Soft, Non-tender, non-distended, positive bowel sounds  Neuro: No new focal deficits.   Extremities: Bilateral pitting pedal edema      Diagnostic Data:    Labs:  Recent Labs   Lab 25  1134 25  1135 25  0655   WBC 9.5  --  8.5   HGB 14.7  --  14.2   MCV 88.0  --  87.9   .0  --  226.0   INR  --  1.09  --        Recent Labs   Lab 25  1134 25  0604 25  0615 25  1705 25  0655   *   < > 101* 102* 110*   BUN 20   < > 21 28* 26*   CREATSERUM 1.21   < > 1.43* 1.58* 1.38*   CA 9.7   < > 9.9 10.1 9.2   ALB 4.5  --   --   --   --       < > 141 138 140   K 3.9   < > 4.0  4.0 4.1 3.5      < > 101 98 102   CO2 26.0   < > 31.0 30.0 29.0   ALKPHO 78  --   --   --   --    AST 33  --   --   --   --    ALT 38   --   --   --   --    BILT 1.0  --   --   --   --    TP 6.9  --   --   --   --     < > = values in this interval not displayed.       Estimated Glomerular Filtration Rate: 51 mL/min/1.73m2 (A) (result from lab).    Recent Labs   Lab 07/22/25  1134   TROPHS 26       Recent Labs   Lab 07/22/25  1135   PTP 14.2   INR 1.09                  Microbiology    No results found for this visit on 07/22/25.      Imaging: Reviewed in Epic.    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  IV diuretics  Pro-bnp elevated  Troponin normal  Cardiology on consult  S/P cardiac cath done by cardiology Dr. Schroeder on 7/23/2024 which showed multivessel disease, seen by CV surgeon also for advice that patient quite high risk for surgery and advised complex PCI if feasible, interventional cardiology following regarding this  - Status post CHRISTA done by cardiology on 7/24/2025- Severe MR.    #Atrial fibrillation with RVR  TSH  Echo done on 7/22/2025 showed EF of 20 to 25%, severe diffuse hypokinesis  Heparin gtt  Cardizem gtt     #HTN, controlled  Resume home meds as able    # Hyperlipidemia, dyslipidemia-continue statin    #BPH, prostate cancer    Discussed with patient,   Discussed with RN.  Discharge planning today per cardiology, LifeVest at time of discharge per cardiologist.  Cardiac  medications at the time of discharge per cardiologist.  Follow-up with cardiologist as directed and with regular outpatient primary care physician Sean Valdez MD within 1 week in office    Barbara Sher MD    Supplementary Documentation:     Quality:  DVT Mechanical Prophylaxis:   SCDs,    DVT Pharmacologic Prophylaxis   Medication    apixaban (Eliquis) tab 5 mg      DVT Pharmacologic prophylaxis: Aspirin 162 mg         Code Status: Full Code  Vazquez: No urinary catheter in place  Vazquez Duration (in days):   Central line:    JORDAN: 7/27/2025    Discharge is dependent on: Clinical progress, cardiology clearance  At this point Mr. Oneill is expected to be discharge to: Home    The 21st Century Cures Act makes medical notes like these available to patients in the interest of transparency. Please be advised this is a medical document. Medical documents are intended to carry relevant information, facts as evident, and the clinical opinion of the practitioner. The medical note is intended as peer to peer communication and may appear blunt or direct. It is written in medical language and may contain abbreviations or verbiage that are unfamiliar.              **Certification      PHYSICIAN Certification of Need for Inpatient Hospitalization - Initial Certification    Patient will require inpatient services that will reasonably be expected to span two midnight's based on the clinical documentation in H+P.   Based on patients current state of illness, I anticipate that, after discharge, patient will require TBD.

## 2025-07-27 NOTE — PROGRESS NOTES
Progress Note  Nish Oneill Patient Status:  Inpatient    1940 MRN [id number]   Location [facility] 2NE-A Attending Barbara Sher MD   Hosp Day # 5 PCP Sean Valdez MD     Subjective   No acute issues overnight. Overall feeling much better. Ambulating w/o exertional symptoms.       Objective:   /81 (BP Location: Left arm)   Pulse 78   Temp 97.8 °F (36.6 °C) (Oral)   Resp 16   Ht 5' 11\" (1.803 m)   Wt 186 lb 1.1 oz (84.4 kg)   SpO2 92%   BMI 25.95 kg/m²     Telemetry: afib, rates in 80-90's     Intake/Output:    Intake/Output Summary (Last 24 hours) at 2025 0554  Last data filed at 2025 1615  Gross per 24 hour   Intake 240 ml   Output 900 ml   Net -660 ml       Wt Readings from Last 3 Encounters:   25 186 lb 1.1 oz (84.4 kg)   25 197 lb (89.4 kg)   25 197 lb (89.4 kg)         Labs:  Recent Labs   Lab 25  0530 25  0615 25  1705   * 101* 102*   BUN 18 21 28*   CREATSERUM 1.18 1.43* 1.58*   EGFRCR 61 49* 43*   CA 9.3 9.9 10.1    141 138   K 3.7  3.7 4.0  4.0 4.1    101 98   CO2 28.0 31.0 30.0     Recent Labs   Lab 25  1134   RBC 4.93   HGB 14.7   HCT 43.4   MCV 88.0   MCH 29.8   MCHC 33.9   RDW 13.2   NEPRELIM 7.11   WBC 9.5   .0         Recent Labs   Lab 25  1134   TROPHS 26         Review of Systems:     10 point review of systems completed and negative except as noted in HPI      Exam:     Physical Exam:  General: Alert and oriented x 3. No apparent distress.   HEENT: Normocephalic, neck supple, no thyromegaly or adenopathy.  Neck: No JVD, carotids 2+, no bruits.  Cardiac: Irregularly irregular. Systolic murmur 3/6.   Lungs: Clear without wheezes, rales, rhonchi or dullness.  Normal excursions and effort.  Abdomen: Soft, non-tender. BS-present.  Extremities: Without clubbing or cyanosis. No edema.    Neurologic: Alert and oriented, normal affect. No focal defects  Skin: Warm and dry.        Diagnostic Studies:     Echo 7/25/25   Conclusions:     1. Left ventricle: The cavity size was mildly increased. Wall thickness was      normal. Systolic function was markedly reduced. The estimated ejection      fraction was 20-25%, by visual assessment. There was severe diffuse      hypokinesis. Left ventricular diastolic function is indeterminate.      Doppler parameters are consistent with elevated mean left atrial filling      pressure. There was no evidence of a thrombus revealed by acoustic      contrast opacification.   2. Right ventricle: The cavity size was normal. Systolic function was low      normal.   3. Left atrium: The atrium was moderately dilated.   4. Right atrium: The atrium was moderately dilated.   5. Aortic valve: The valve was trileaflet. The leaflets were mildly      calcified. Transvalvular velocity was minimally increased. There was no      evidence for significant stenosis. The peak systolic velocity was      1.81m/sec. The mean systolic gradient was 7mm Hg.   6. Mitral valve: The leaflets were mildly thickened and mildly calcified.      There was severe4+ regurgitation.   7. Pulmonary arteries: Systolic pressure was mildly increased, in the range      of 40mm Hg to 45mm Hg.   8. Pericardium, extracardiac: A trivial pericardial effusion was identified.      There was no evidence of hemodynamic compromise. There were bilateral      pleural effusions present.   Impressions:  No previous study from [facility] was   available for comparison.     [facility] 7/23/25  % mid with L>R collaterals  LM 80% ostial, 80% distal  LAD 80% prox  Cx 80% mid    Assessment and Plan:     Assessment:  # acute HFrEF, LVEF 20-25%   New ischemic cardiomyopathy   Euvolemic, will transition to PO lasix 20 mg bid at discharge   GDMT: ARB, BB   # multivessel CAD as above   Plan for complex PCI next month   High intensity statin, aspirin   # severe MR, functional   S/p CHRISTA with severe 4+ MR    Continue to optimize volume status   # new onset atrial fibrillation, presented with RVR   PO Amiodarone load 400 mg bid for better rate control, No ROLDAN thrombus on CHRISTA 7/24, has been on heparin IV since admission, transitioned to Eliquis 5 mg bid 7/25    Plan:  Plan for complex PCI next month with Dr. Bullock   Euvolemic, transition to PO lasix at discharge   Cr trending down   PO amiodarone 400 mg bid x7 days then 200 mg daily, continue BB for rate control, eliquis   Life vest set up for discharge   Ok to discharge from cardiac perspective. Will arrange for close follow up.      Plan of care discussed with patient, RN.      Kadi Carmona, APRN  7/27/2025  Ph [phone] ([place])  Ph [phone] ([place])

## 2025-07-27 NOTE — DISCHARGE SUMMARY
[facility]  Discharge Summary    Nish Oneill Patient Status:  Inpatient    1940 MRN [id number]   Location [facility] 2NE-A Attending No att. providers found   Hosp Day # 5 PCP Sean Valdez MD     Date of Admission: 2025    Date of Discharge: 2025      Disposition: Home or Self Care    Discharge Diagnosis:   #Exertional dyspnea, due to new onset acute systolic CHF, A fib with RVR, cardiomyopathy, multivessel CAD, valvular heart disease with severe MR  # Acute systolic heart failure  #Atrial fibrillation with RVR  # Cardiomyopathy  # Multivessel CAD  # Severe mitral regurgitation  #HTN  # Hyperlipidemia, dyslipidemia  #BPH, prostate cancer    Chief Complaint:   Chief Complaint   Patient presents with    Arrythmia/Palpitations     Pt to ed from [facility] for afib-rvr, pt went to       History of Present Illness:   Nish Oneill is a 85 year old male with past medical history significant for HTN, DL, BPH, prostate cancer present to the ER with difficulty breathing.  Pt states that he has noticed his breathing is more labored.  He also c/o difficulty lying down flat and of swelling in his legs.  He denies chest pain, palpitations, lightheadedness, or dizziness.  He also noticed increasing abdominal distension.  He went to immediate care today and was transferred to the ER.  He was found to be in A fib with RVR and pro-bnp was elevated.  He was started on cardizem and heparin drips and received IV lasix with improvement of his breathing.   Please refer to symptoms written by Dr. Samantha Quach for details on admission    Brief Synopsis:     #Exertional dyspnea, due to new onset acute systolic CHF, A fib with RVR, cardiomyopathy, multivessel CAD, valvular heart disease with severe MR   echocardiogram done on 2025 showed EF of 20 to 25%, severe diffuse hypokinesis.  Treated with IV diuretics  Pro-bnp elevated  Troponin normal  Seen by cardiology on consult  S/P cardiac cath done by  cardiology Dr. Schroeder on 7/23/2024 which showed multivessel disease, seen by CV surgeon also for advice that patient quite high risk for surgery and advised complex PCI if feasible, interventional cardiology following regarding this  - Status post CHRISTA done by cardiology on 7/24/2025- Severe MR.    #Atrial fibrillation with RVR  TSH  Echo done on 7/22/2025 showed EF of 20 to 25%, severe diffuse hypokinesis  Status post heparin gtt  Status post Cardizem gtt  Started on amiodarone by cardiology.     #HTN, controlled    # Hyperlipidemia, dyslipidemia-continued statin    #BPH, prostate cancer     Discharge planning today per cardiology, LifeVest at time of discharge per cardiologist.  Cardiac medications at the time of discharge per cardiologist.  Follow-up with cardiologist as directed and with regular outpatient primary care physician Sean Valdez MD within 1 week in office      TCM Diagnosis at discharge from Hospital: Other: See above; still recommend for TCM follow-up    Lace+ Score: 75  59-90 High Risk  29-58 Medium Risk  0-28   Low Risk.     TCM Follow-Up Recommendation:Nish Oneill   LACE > 58: High Risk of readmission after discharge from the hospital.      PCP: Sean Valdez MD    Consultations:   Consultants         Provider   Role Specialty     Gera Rae MD      Consulting Physician Surgery, Cardiothoracic     Fernando Schroeder MD      Consulting Physician Interventional, Cardiology       Procedures during hospitalization:   Coronary angiogram done by cardiology Fernando Schroeder MD on 7/23/2025 which showed multivessel CAD/severe three-vessel CAD  CHRISTA done by cardiology Dr. Matti Gallardo MD on 7/24/2025    Incidental or significant findings and recommendations (brief descriptions):  None    Lab/Test results pending at Discharge:   None      Discharge Medications:        Discharge Medications        START taking these medications        Instructions Prescription details   Amiodarone HCl  400 MG Tabs  Commonly known as: PACERONE      Take 1 tablet (400 mg total) by mouth 2 (two) times daily with meals for 5 days.   Stop taking on: August 1, 2025  Quantity: 10 tablet  Refills: 0     amiodarone 200 MG Tabs  Commonly known as: Pacerone  Start taking on: August 2, 2025      Take 1 tablet (200 mg total) by mouth daily.   Quantity: 30 tablet  Refills: 0     apixaban 5 MG Tabs  Commonly known as: Eliquis      Take 1 tablet (5 mg total) by mouth 2 (two) times daily.   Quantity: 60 tablet  Refills: 0     aspirin 81 MG Chew  Start taking on: July 28, 2025      Chew 1 tablet (81 mg total) by mouth daily.   Quantity: 30 tablet  Refills: 0     furosemide 20 MG Tabs  Commonly known as: Lasix      Take 1 tablet (20 mg total) by mouth 2 (two) times daily.   Quantity: 60 tablet  Refills: 0     metoprolol succinate ER 50 MG Tb24  Commonly known as: Toprol XL      Take 1 tablet (50 mg total) by mouth 2x Daily(Beta Blocker).   Quantity: 60 tablet  Refills: 0     potassium chloride 20 MEQ Pack  Commonly known as: Klor-Con      Take 20 mEq by mouth 2 (two) times daily.   Quantity: 60 each  Refills: 0            CHANGE how you take these medications        Instructions Prescription details   losartan 25 MG Tabs  Commonly known as: Cozaar  Start taking on: July 28, 2025  What changed:   medication strength  how much to take      Take 1 tablet (25 mg total) by mouth daily.   Refills: 0     Magnesium 100 MG Tabs  What changed: when to take this      Take 1 tablet (100 mg total) by mouth in the morning and 1 tablet (100 mg total) before bedtime.   Quantity: 30 tablet  Refills: 0            CONTINUE taking these medications        Instructions Prescription details   pravastatin 20 MG Tabs  Commonly known as: Pravachol      Take 1 tablet (20 mg total) by mouth nightly.   Quantity: 90 tablet  Refills: 3            STOP taking these medications      hydroCHLOROthiazide 25 MG Tabs                  Where to Get Your Medications         These medications were sent to [id number] - [place], [place] - [address] [phone], [phone] [address], [facility] [address]      Phone: [phone]   amiodarone 200 MG Tabs  Amiodarone HCl 400 MG Tabs  apixaban 5 MG Tabs  aspirin 81 MG Chew  furosemide 20 MG Tabs  Magnesium 100 MG Tabs  metoprolol succinate ER 50 MG Tb24  potassium chloride 20 MEQ Pack          [place] prescription monitoring program data reviewed in epic before prescribing narcotics/controlled substances: Not applicable as no narcotics prescribed    Follow up Visits: Follow-up with Sean Valdez MD in 1 week    [facility]  [address] [facility] [address]  [facility] [address] [phone]  Schedule an appointment as soon as possible for a visit      Sean Valdez MD  [address] [place] [address] [address]  [place] [address]  [phone]    Schedule an appointment as soon as possible for a visit in 1 week(s)      Tran Wood APRN  [address] [facility]  [facility] [address]  [place] [address]  [phone]    Go on 8/1/2025  You have an appointment scheduled for 8/1/25 at 11:00 am    *Please do not remove appointment, call HF  at [phone]*    Fernando Schroeder MD  [address] [place] [address]  [phone]    Follow up      Gera Rae MD  [address] [facility] [address]  [place] [address]  [phone]    Follow up      Appointments for Next 30 Days 7/27/2025 - 8/26/2025      None              Physical Exam:  /90 (BP Location: Left arm)   Pulse 101   Temp 97.8 °F (36.6 °C) (Oral)   Resp 19   Ht 5' 11\" (1.803 m)   Wt 186 lb 1.1 oz (84.4 kg)   SpO2 95%   BMI 25.95 kg/m²     General: No acute distress  Respiratory: No wheezes, no rhonchi  Cardiovascular: S1, S2, irregularly irregular  Abdomen: Soft, Non-tender, non-distended, positive bowel sounds  Neuro: No new focal deficits.   Extremities: Bilateral pitting pedal edema       Discharge Condition: stable    Patient Discharge Instructions: See  electronic chart      More than 30 minutes on discharge    Barbara Sher MD  7/27/2025

## 2025-07-28 ENCOUNTER — PATIENT OUTREACH (OUTPATIENT)
Age: 85
End: 2025-07-28

## 2025-07-29 ENCOUNTER — PATIENT OUTREACH (OUTPATIENT)
Dept: CASE MANAGEMENT | Age: 85
End: 2025-07-29

## 2025-08-01 ENCOUNTER — OFFICE VISIT (OUTPATIENT)
Dept: INTERNAL MEDICINE CLINIC | Facility: CLINIC | Age: 85
End: 2025-08-01

## 2025-08-01 ENCOUNTER — HOSPITAL ENCOUNTER (OUTPATIENT)
Dept: LAB | Facility: HOSPITAL | Age: 85
Discharge: HOME | End: 2025-08-01
Attending: NURSE PRACTITIONER

## 2025-08-01 VITALS
TEMPERATURE: 98 F | HEIGHT: 71 IN | HEART RATE: 76 BPM | DIASTOLIC BLOOD PRESSURE: 60 MMHG | WEIGHT: 186 LBS | OXYGEN SATURATION: 98 % | SYSTOLIC BLOOD PRESSURE: 98 MMHG | RESPIRATION RATE: 18 BRPM | BODY MASS INDEX: 26.04 KG/M2

## 2025-08-01 DIAGNOSIS — I42.9 CARDIOMYOPATHY, UNSPECIFIED TYPE (HCC): ICD-10-CM

## 2025-08-01 DIAGNOSIS — I10 ESSENTIAL HYPERTENSION: ICD-10-CM

## 2025-08-01 DIAGNOSIS — I48.91 ATRIAL FIBRILLATION WITH RVR (HCC): ICD-10-CM

## 2025-08-01 DIAGNOSIS — R06.02 SHORTNESS OF BREATH: Primary | ICD-10-CM

## 2025-08-01 DIAGNOSIS — N17.9 AKI (ACUTE KIDNEY INJURY): ICD-10-CM

## 2025-08-01 DIAGNOSIS — I34.0 MITRAL VALVE INSUFFICIENCY, UNSPECIFIED ETIOLOGY: ICD-10-CM

## 2025-08-01 DIAGNOSIS — I25.10 CORONARY ARTERY DISEASE INVOLVING NATIVE CORONARY ARTERY OF NATIVE HEART WITHOUT ANGINA PECTORIS: ICD-10-CM

## 2025-08-01 DIAGNOSIS — Z79.01 ANTICOAGULATED ON ELIQUIS: ICD-10-CM

## 2025-08-01 DIAGNOSIS — I50.21 ACUTE SYSTOLIC HEART FAILURE (HCC): ICD-10-CM

## 2025-08-01 DIAGNOSIS — R60.0 BILATERAL LOWER EXTREMITY EDEMA: ICD-10-CM

## 2025-08-01 LAB
ANION GAP SERPL CALC-SCNC: 7 MMOL/L (ref 0–18)
BUN BLD-MCNC: 31 MG/DL (ref 9–23)
CALCIUM BLD-MCNC: 9.5 MG/DL (ref 8.7–10.6)
CHLORIDE SERPL-SCNC: 103 MMOL/L (ref 98–112)
CO2 SERPL-SCNC: 30 MMOL/L (ref 21–32)
CREAT BLD-MCNC: 1.64 MG/DL (ref 0.7–1.3)
EGFRCR SERPLBLD CKD-EPI 2021: 41 ML/MIN/1.73M2 (ref 60–?)
FASTING STATUS PATIENT QL REPORTED: NO
GLUCOSE BLD-MCNC: 101 MG/DL (ref 70–99)
MAGNESIUM SERPL-MCNC: 2.3 MG/DL (ref 1.6–2.6)
OSMOLALITY SERPL CALC.SUM OF ELEC: 297 MOSM/KG (ref 275–295)
POTASSIUM SERPL-SCNC: 4.6 MMOL/L (ref 3.5–5.1)
SODIUM SERPL-SCNC: 140 MMOL/L (ref 136–145)

## 2025-08-01 PROCEDURE — 83735 ASSAY OF MAGNESIUM: CPT | Performed by: NURSE PRACTITIONER

## 2025-08-01 PROCEDURE — 99495 TRANSJ CARE MGMT MOD F2F 14D: CPT | Performed by: NURSE PRACTITIONER

## 2025-08-01 PROCEDURE — 36415 COLL VENOUS BLD VENIPUNCTURE: CPT | Performed by: NURSE PRACTITIONER

## 2025-08-01 PROCEDURE — 80048 BASIC METABOLIC PNL TOTAL CA: CPT | Performed by: NURSE PRACTITIONER

## 2025-08-03 PROBLEM — R06.02 SHORTNESS OF BREATH: Status: ACTIVE | Noted: 2025-08-03

## 2025-08-03 PROBLEM — Z79.01 ANTICOAGULATED ON ELIQUIS: Status: ACTIVE | Noted: 2025-08-03

## 2025-08-03 PROBLEM — R60.0 BILATERAL LOWER EXTREMITY EDEMA: Status: ACTIVE | Noted: 2025-08-03

## 2025-08-03 PROBLEM — I25.10 CORONARY ARTERY DISEASE INVOLVING NATIVE CORONARY ARTERY OF NATIVE HEART WITHOUT ANGINA PECTORIS: Status: ACTIVE | Noted: 2025-08-03

## 2025-08-03 PROBLEM — N17.9 AKI (ACUTE KIDNEY INJURY): Status: ACTIVE | Noted: 2025-08-03

## 2025-08-04 ENCOUNTER — OFFICE VISIT (OUTPATIENT)
Dept: INTERNAL MEDICINE CLINIC | Facility: CLINIC | Age: 85
End: 2025-08-04

## 2025-08-04 VITALS
HEART RATE: 70 BPM | RESPIRATION RATE: 16 BRPM | OXYGEN SATURATION: 99 % | DIASTOLIC BLOOD PRESSURE: 54 MMHG | HEIGHT: 71 IN | TEMPERATURE: 98 F | SYSTOLIC BLOOD PRESSURE: 100 MMHG | WEIGHT: 187 LBS | BODY MASS INDEX: 26.18 KG/M2

## 2025-08-04 DIAGNOSIS — I42.9 CARDIOMYOPATHY, UNSPECIFIED TYPE (HCC): Primary | ICD-10-CM

## 2025-08-04 DIAGNOSIS — I50.23 ACUTE ON CHRONIC SYSTOLIC HEART FAILURE (HCC): ICD-10-CM

## 2025-08-04 DIAGNOSIS — I34.0 MITRAL VALVE INSUFFICIENCY, UNSPECIFIED ETIOLOGY: ICD-10-CM

## 2025-08-04 DIAGNOSIS — I10 ESSENTIAL HYPERTENSION: ICD-10-CM

## 2025-08-04 DIAGNOSIS — I50.21 ACUTE SYSTOLIC HEART FAILURE (HCC): ICD-10-CM

## 2025-08-04 PROCEDURE — 99214 OFFICE O/P EST MOD 30 MIN: CPT | Performed by: INTERNAL MEDICINE

## 2025-08-07 ENCOUNTER — HOSPITAL ENCOUNTER (OUTPATIENT)
Dept: LAB | Facility: HOSPITAL | Age: 85
Discharge: HOME | End: 2025-08-07
Attending: NURSE PRACTITIONER

## 2025-08-07 LAB
ANION GAP SERPL CALC-SCNC: 8 MMOL/L (ref 0–18)
BUN BLD-MCNC: 22 MG/DL (ref 9–23)
CALCIUM BLD-MCNC: 9.7 MG/DL (ref 8.7–10.6)
CHLORIDE SERPL-SCNC: 105 MMOL/L (ref 98–112)
CO2 SERPL-SCNC: 27 MMOL/L (ref 21–32)
CREAT BLD-MCNC: 1.5 MG/DL (ref 0.7–1.3)
EGFRCR SERPLBLD CKD-EPI 2021: 46 ML/MIN/1.73M2 (ref 60–?)
FASTING STATUS PATIENT QL REPORTED: NO
GLUCOSE BLD-MCNC: 86 MG/DL (ref 70–99)
OSMOLALITY SERPL CALC.SUM OF ELEC: 293 MOSM/KG (ref 275–295)
POTASSIUM SERPL-SCNC: 4.5 MMOL/L (ref 3.5–5.1)
SODIUM SERPL-SCNC: 140 MMOL/L (ref 136–145)

## 2025-08-07 PROCEDURE — 80048 BASIC METABOLIC PNL TOTAL CA: CPT | Performed by: NURSE PRACTITIONER

## 2025-08-07 PROCEDURE — 36415 COLL VENOUS BLD VENIPUNCTURE: CPT | Performed by: NURSE PRACTITIONER

## 2025-08-14 DIAGNOSIS — I10 ESSENTIAL HYPERTENSION: ICD-10-CM

## 2025-08-18 VITALS
HEIGHT: 71 IN | BODY MASS INDEX: 25.2 KG/M2 | RESPIRATION RATE: 19 BRPM | WEIGHT: 180 LBS | HEART RATE: 101 BPM | TEMPERATURE: 98 F | OXYGEN SATURATION: 95 % | DIASTOLIC BLOOD PRESSURE: 90 MMHG | SYSTOLIC BLOOD PRESSURE: 104 MMHG

## 2025-08-18 RX ORDER — LOSARTAN POTASSIUM 50 MG/1
50 TABLET ORAL DAILY
Qty: 90 TABLET | Refills: 3 | OUTPATIENT
Start: 2025-08-18

## 2025-08-20 ENCOUNTER — HOSPITAL ENCOUNTER (OUTPATIENT)
Dept: LAB | Facility: HOSPITAL | Age: 85
Discharge: HOME | End: 2025-08-20
Attending: NURSE PRACTITIONER

## 2025-08-20 LAB
ANION GAP SERPL CALC-SCNC: 8 MMOL/L (ref 0–18)
BUN BLD-MCNC: 23 MG/DL (ref 9–23)
CALCIUM BLD-MCNC: 9.7 MG/DL (ref 8.7–10.6)
CHLORIDE SERPL-SCNC: 105 MMOL/L (ref 98–112)
CO2 SERPL-SCNC: 28 MMOL/L (ref 21–32)
CREAT BLD-MCNC: 1.48 MG/DL (ref 0.7–1.3)
EGFRCR SERPLBLD CKD-EPI 2021: 47 ML/MIN/1.73M2 (ref 60–?)
FASTING STATUS PATIENT QL REPORTED: NO
GLUCOSE BLD-MCNC: 95 MG/DL (ref 70–99)
OSMOLALITY SERPL CALC.SUM OF ELEC: 295 MOSM/KG (ref 275–295)
POTASSIUM SERPL-SCNC: 4.9 MMOL/L (ref 3.5–5.1)
SODIUM SERPL-SCNC: 141 MMOL/L (ref 136–145)

## 2025-08-20 PROCEDURE — 80048 BASIC METABOLIC PNL TOTAL CA: CPT | Performed by: NURSE PRACTITIONER

## 2025-08-20 PROCEDURE — 36415 COLL VENOUS BLD VENIPUNCTURE: CPT | Performed by: NURSE PRACTITIONER

## 2025-08-21 RX ORDER — CLOPIDOGREL BISULFATE 75 MG/1
75 TABLET ORAL DAILY
Status: ON HOLD | COMMUNITY
End: 2025-08-28

## 2025-08-21 RX ORDER — DAPAGLIFLOZIN 10 MG/1
10 TABLET, FILM COATED ORAL DAILY
Status: ON HOLD | COMMUNITY
End: 2025-08-28

## 2025-08-26 ENCOUNTER — ANESTHESIA EVENT (OUTPATIENT)
Dept: INTERVENTIONAL RADIOLOGY/VASCULAR | Facility: HOSPITAL | Age: 85
End: 2025-08-26

## 2025-08-27 ENCOUNTER — ANESTHESIA (OUTPATIENT)
Dept: INTERVENTIONAL RADIOLOGY/VASCULAR | Facility: HOSPITAL | Age: 85
End: 2025-08-27

## 2025-08-27 ENCOUNTER — HOSPITAL ENCOUNTER (INPATIENT)
Dept: INTERVENTIONAL RADIOLOGY/VASCULAR | Facility: HOSPITAL | Age: 85
LOS: 1 days | Discharge: HOME | End: 2025-08-28
Attending: INTERNAL MEDICINE | Admitting: INTERNAL MEDICINE

## 2025-08-27 DIAGNOSIS — I25.10 CHRONIC TOTAL OCCLUSION OF NATIVE CORONARY ARTERY: ICD-10-CM

## 2025-08-27 DIAGNOSIS — E78.00 PURE HYPERCHOLESTEROLEMIA: ICD-10-CM

## 2025-08-27 DIAGNOSIS — I25.82 CHRONIC TOTAL OCCLUSION OF NATIVE CORONARY ARTERY: ICD-10-CM

## 2025-08-27 LAB
BASE EXCESS BLD CALC-SCNC: -2 MMOL/L
CA-I BLD-SCNC: 1.15 MMOL/L (ref 1.12–1.32)
CO2 BLD-SCNC: 24 MMOL/L (ref 22–32)
GLUCOSE BLD-MCNC: 123 MG/DL (ref 70–99)
HCO3 BLD-SCNC: 22.7 MEQ/L
HCT VFR BLD CALC: 37 % (ref 37–53)
ISTAT ACTIVATED CLOTTING TIME: 227 SECONDS (ref 74–137)
ISTAT ACTIVATED CLOTTING TIME: 308 SECONDS (ref 74–137)
ISTAT ACTIVATED CLOTTING TIME: 308 SECONDS (ref 74–137)
ISTAT ACTIVATED CLOTTING TIME: 314 SECONDS (ref 74–137)
ISTAT ACTIVATED CLOTTING TIME: 331 SECONDS (ref 74–137)
ISTAT ACTIVATED CLOTTING TIME: 348 SECONDS (ref 74–137)
ISTAT ACTIVATED CLOTTING TIME: 377 SECONDS (ref 74–137)
MRSA DNA SPEC QL NAA+PROBE: NEGATIVE
PCO2 BLD: 35.9 MMHG
PH BLD: 7.41
PO2 BLD: 340 MMHG
POTASSIUM BLD-SCNC: 3.9 MMOL/L (ref 3.6–5.1)
SAO2 % BLD: 100 %
SODIUM BLD-SCNC: 138 MMOL/L (ref 136–145)

## 2025-08-27 PROCEDURE — 33990 INSJ PERQ VAD L HRT ARTERIAL: CPT | Performed by: INTERNAL MEDICINE

## 2025-08-27 PROCEDURE — 82803 BLOOD GASES ANY COMBINATION: CPT

## 2025-08-27 PROCEDURE — 92978 ENDOLUMINL IVUS OCT C 1ST: CPT | Performed by: INTERNAL MEDICINE

## 2025-08-27 PROCEDURE — 93451 RIGHT HEART CATH: CPT | Performed by: INTERNAL MEDICINE

## 2025-08-27 PROCEDURE — 02HA3RJ INSERTION OF SHORT-TERM EXTERNAL HEART ASSIST SYSTEM INTO HEART, INTRAOPERATIVE, PERCUTANEOUS APPROACH: ICD-10-PCS | Performed by: INTERNAL MEDICINE

## 2025-08-27 PROCEDURE — 85014 HEMATOCRIT: CPT

## 2025-08-27 PROCEDURE — 027337Z DILATION OF CORONARY ARTERY, FOUR OR MORE ARTERIES WITH FOUR OR MORE DRUG-ELUTING INTRALUMINAL DEVICES, PERCUTANEOUS APPROACH: ICD-10-PCS | Performed by: INTERNAL MEDICINE

## 2025-08-27 PROCEDURE — 87641 MR-STAPH DNA AMP PROBE: CPT | Performed by: INTERNAL MEDICINE

## 2025-08-27 PROCEDURE — 93010 ELECTROCARDIOGRAM REPORT: CPT | Performed by: INTERNAL MEDICINE

## 2025-08-27 PROCEDURE — B211YZZ FLUOROSCOPY OF MULTIPLE CORONARY ARTERIES USING OTHER CONTRAST: ICD-10-PCS | Performed by: INTERNAL MEDICINE

## 2025-08-27 PROCEDURE — 82330 ASSAY OF CALCIUM: CPT

## 2025-08-27 PROCEDURE — 84132 ASSAY OF SERUM POTASSIUM: CPT

## 2025-08-27 PROCEDURE — 93005 ELECTROCARDIOGRAM TRACING: CPT

## 2025-08-27 PROCEDURE — 92979 ENDOLUMINL IVUS OCT C EA: CPT | Performed by: INTERNAL MEDICINE

## 2025-08-27 PROCEDURE — B241ZZ3 ULTRASONOGRAPHY OF MULTIPLE CORONARY ARTERIES, INTRAVASCULAR: ICD-10-PCS | Performed by: INTERNAL MEDICINE

## 2025-08-27 PROCEDURE — 84295 ASSAY OF SERUM SODIUM: CPT

## 2025-08-27 PROCEDURE — 5A0221D ASSISTANCE WITH CARDIAC OUTPUT USING IMPELLER PUMP, CONTINUOUS: ICD-10-PCS | Performed by: INTERNAL MEDICINE

## 2025-08-27 PROCEDURE — 85347 COAGULATION TIME ACTIVATED: CPT

## 2025-08-27 PROCEDURE — 4A023N6 MEASUREMENT OF CARDIAC SAMPLING AND PRESSURE, RIGHT HEART, PERCUTANEOUS APPROACH: ICD-10-PCS | Performed by: INTERNAL MEDICINE

## 2025-08-27 RX ORDER — PRAVASTATIN SODIUM 20 MG
20 TABLET ORAL NIGHTLY
Status: DISCONTINUED | OUTPATIENT
Start: 2025-08-27 | End: 2025-08-28

## 2025-08-27 RX ORDER — SODIUM CHLORIDE 9 MG/ML
INJECTION, SOLUTION INTRAVENOUS
Status: DISCONTINUED | OUTPATIENT
Start: 2025-08-28 | End: 2025-08-27 | Stop reason: HOSPADM

## 2025-08-27 RX ORDER — ASPIRIN 81 MG/1
81 TABLET ORAL DAILY
Status: DISCONTINUED | OUTPATIENT
Start: 2025-08-28 | End: 2025-08-28

## 2025-08-27 RX ORDER — LIDOCAINE HYDROCHLORIDE 10 MG/ML
INJECTION, SOLUTION EPIDURAL; INFILTRATION; INTRACAUDAL; PERINEURAL AS NEEDED
Status: DISCONTINUED | OUTPATIENT
Start: 2025-08-27 | End: 2025-08-27 | Stop reason: SURG

## 2025-08-27 RX ORDER — HEPARIN SODIUM 1000 [USP'U]/ML
INJECTION, SOLUTION INTRAVENOUS; SUBCUTANEOUS AS NEEDED
Status: DISCONTINUED | OUTPATIENT
Start: 2025-08-27 | End: 2025-08-27 | Stop reason: SURG

## 2025-08-27 RX ORDER — NITROGLYCERIN 20 MG/100ML
INJECTION INTRAVENOUS
Status: COMPLETED
Start: 2025-08-27 | End: 2025-08-27

## 2025-08-27 RX ORDER — SODIUM CHLORIDE 9 MG/ML
INJECTION, SOLUTION INTRAVENOUS CONTINUOUS
Status: ACTIVE | OUTPATIENT
Start: 2025-08-27 | End: 2025-08-27

## 2025-08-27 RX ORDER — AMIODARONE HYDROCHLORIDE 200 MG/1
200 TABLET ORAL DAILY
Status: DISCONTINUED | OUTPATIENT
Start: 2025-08-28 | End: 2025-08-28

## 2025-08-27 RX ORDER — DOBUTAMINE HYDROCHLORIDE 200 MG/100ML
INJECTION INTRAVENOUS
Status: COMPLETED
Start: 2025-08-27 | End: 2025-08-27

## 2025-08-27 RX ORDER — ROCURONIUM BROMIDE 10 MG/ML
INJECTION, SOLUTION INTRAVENOUS AS NEEDED
Status: DISCONTINUED | OUTPATIENT
Start: 2025-08-27 | End: 2025-08-27 | Stop reason: SURG

## 2025-08-27 RX ORDER — LOSARTAN POTASSIUM 50 MG/1
50 TABLET ORAL DAILY
Status: DISCONTINUED | OUTPATIENT
Start: 2025-08-27 | End: 2025-08-28

## 2025-08-27 RX ORDER — SODIUM CHLORIDE, SODIUM LACTATE, POTASSIUM CHLORIDE, CALCIUM CHLORIDE 600; 310; 30; 20 MG/100ML; MG/100ML; MG/100ML; MG/100ML
INJECTION, SOLUTION INTRAVENOUS CONTINUOUS PRN
Status: DISCONTINUED | OUTPATIENT
Start: 2025-08-27 | End: 2025-08-27 | Stop reason: SURG

## 2025-08-27 RX ORDER — CLOPIDOGREL BISULFATE 75 MG/1
75 TABLET ORAL DAILY
Status: DISCONTINUED | OUTPATIENT
Start: 2025-08-28 | End: 2025-08-28

## 2025-08-27 RX ORDER — KETAMINE HYDROCHLORIDE 50 MG/ML
INJECTION, SOLUTION INTRAMUSCULAR; INTRAVENOUS AS NEEDED
Status: DISCONTINUED | OUTPATIENT
Start: 2025-08-27 | End: 2025-08-27 | Stop reason: SURG

## 2025-08-27 RX ORDER — VERAPAMIL HYDROCHLORIDE 2.5 MG/ML
INJECTION INTRAVENOUS
Status: COMPLETED
Start: 2025-08-27 | End: 2025-08-27

## 2025-08-27 RX ORDER — LIDOCAINE HYDROCHLORIDE 10 MG/ML
INJECTION, SOLUTION EPIDURAL; INFILTRATION; INTRACAUDAL; PERINEURAL
Status: COMPLETED
Start: 2025-08-27 | End: 2025-08-27

## 2025-08-27 RX ORDER — DEXAMETHASONE SODIUM PHOSPHATE 4 MG/ML
VIAL (ML) INJECTION AS NEEDED
Status: DISCONTINUED | OUTPATIENT
Start: 2025-08-27 | End: 2025-08-27 | Stop reason: SURG

## 2025-08-27 RX ORDER — ONDANSETRON 2 MG/ML
INJECTION INTRAMUSCULAR; INTRAVENOUS AS NEEDED
Status: DISCONTINUED | OUTPATIENT
Start: 2025-08-27 | End: 2025-08-27 | Stop reason: SURG

## 2025-08-27 RX ORDER — HEPARIN SODIUM 5000 [USP'U]/ML
INJECTION, SOLUTION INTRAVENOUS; SUBCUTANEOUS
Status: COMPLETED
Start: 2025-08-27 | End: 2025-08-27

## 2025-08-27 RX ORDER — METOPROLOL SUCCINATE 50 MG/1
50 TABLET, EXTENDED RELEASE ORAL
Status: DISCONTINUED | OUTPATIENT
Start: 2025-08-27 | End: 2025-08-28

## 2025-08-27 RX ADMIN — SODIUM CHLORIDE, SODIUM LACTATE, POTASSIUM CHLORIDE, CALCIUM CHLORIDE: 600; 310; 30; 20 INJECTION, SOLUTION INTRAVENOUS at 13:11:00

## 2025-08-27 RX ADMIN — ONDANSETRON 4 MG: 2 INJECTION INTRAMUSCULAR; INTRAVENOUS at 16:52:00

## 2025-08-27 RX ADMIN — HEPARIN SODIUM 6000 UNITS: 1000 INJECTION, SOLUTION INTRAVENOUS; SUBCUTANEOUS at 14:08:00

## 2025-08-27 RX ADMIN — HEPARIN SODIUM 8000 UNITS: 1000 INJECTION, SOLUTION INTRAVENOUS; SUBCUTANEOUS at 13:44:00

## 2025-08-27 RX ADMIN — HEPARIN SODIUM 2000 UNITS: 1000 INJECTION, SOLUTION INTRAVENOUS; SUBCUTANEOUS at 15:28:00

## 2025-08-27 RX ADMIN — HEPARIN SODIUM 2000 UNITS: 1000 INJECTION, SOLUTION INTRAVENOUS; SUBCUTANEOUS at 14:33:00

## 2025-08-27 RX ADMIN — HEPARIN SODIUM 2000 UNITS: 1000 INJECTION, SOLUTION INTRAVENOUS; SUBCUTANEOUS at 15:57:00

## 2025-08-27 RX ADMIN — PRAVASTATIN SODIUM 20 MG: 20 MG TABLET ORAL at 20:16:00

## 2025-08-27 RX ADMIN — ROCURONIUM BROMIDE 100 MG: 10 INJECTION, SOLUTION INTRAVENOUS at 13:19:00

## 2025-08-27 RX ADMIN — SODIUM CHLORIDE: 9 INJECTION, SOLUTION INTRAVENOUS at 17:32:00

## 2025-08-27 RX ADMIN — DEXAMETHASONE SODIUM PHOSPHATE 4 MG: 4 MG/ML VIAL (ML) INJECTION at 13:34:00

## 2025-08-27 RX ADMIN — KETAMINE HYDROCHLORIDE 50 MG: 50 INJECTION, SOLUTION INTRAMUSCULAR; INTRAVENOUS at 13:19:00

## 2025-08-27 RX ADMIN — LIDOCAINE HYDROCHLORIDE 10 ML: 10 INJECTION, SOLUTION EPIDURAL; INFILTRATION; INTRACAUDAL; PERINEURAL at 13:19:00

## 2025-08-28 VITALS
HEART RATE: 117 BPM | RESPIRATION RATE: 23 BRPM | SYSTOLIC BLOOD PRESSURE: 115 MMHG | BODY MASS INDEX: 25.46 KG/M2 | WEIGHT: 181.88 LBS | HEIGHT: 71 IN | OXYGEN SATURATION: 97 % | DIASTOLIC BLOOD PRESSURE: 102 MMHG | TEMPERATURE: 98 F

## 2025-08-28 PROBLEM — I25.10 CHRONIC TOTAL OCCLUSION OF NATIVE CORONARY ARTERY: Status: ACTIVE | Noted: 2025-08-28

## 2025-08-28 PROBLEM — I25.82 CHRONIC TOTAL OCCLUSION OF NATIVE CORONARY ARTERY: Status: ACTIVE | Noted: 2025-08-28

## 2025-08-28 LAB
ANION GAP SERPL CALC-SCNC: 10 MMOL/L (ref 0–18)
ATRIAL RATE: 22 BPM
BUN BLD-MCNC: 20 MG/DL (ref 9–23)
CALCIUM BLD-MCNC: 9.4 MG/DL (ref 8.7–10.6)
CHLORIDE SERPL-SCNC: 106 MMOL/L (ref 98–112)
CO2 SERPL-SCNC: 25 MMOL/L (ref 21–32)
CREAT BLD-MCNC: 1.15 MG/DL (ref 0.7–1.3)
EGFRCR SERPLBLD CKD-EPI 2021: 63 ML/MIN/1.73M2 (ref 60–?)
ERYTHROCYTE [DISTWIDTH] IN BLOOD BY AUTOMATED COUNT: 13.5 %
GLUCOSE BLD-MCNC: 126 MG/DL (ref 70–99)
HCT VFR BLD AUTO: 39.4 % (ref 39–53)
HGB BLD-MCNC: 13.3 G/DL (ref 13–17.5)
MCH RBC QN AUTO: 29.6 PG (ref 26–34)
MCHC RBC AUTO-ENTMCNC: 33.8 G/DL (ref 31–37)
MCV RBC AUTO: 87.6 FL (ref 80–100)
OSMOLALITY SERPL CALC.SUM OF ELEC: 296 MOSM/KG (ref 275–295)
PLATELET # BLD AUTO: 186 10(3)UL (ref 150–450)
POTASSIUM SERPL-SCNC: 4.4 MMOL/L (ref 3.5–5.1)
Q-T INTERVAL: 476 MS
QRS DURATION: 102 MS
QTC CALCULATION (BEZET): 463 MS
R AXIS: -23 DEGREES
RBC # BLD AUTO: 4.5 X10(6)UL (ref 3.8–5.8)
SODIUM SERPL-SCNC: 141 MMOL/L (ref 136–145)
T AXIS: -4 DEGREES
VENTRICULAR RATE: 57 BPM
WBC # BLD AUTO: 10 X10(3) UL (ref 4–11)

## 2025-08-28 PROCEDURE — 99211 OFF/OP EST MAY X REQ PHY/QHP: CPT

## 2025-08-28 PROCEDURE — 85027 COMPLETE CBC AUTOMATED: CPT | Performed by: INTERNAL MEDICINE

## 2025-08-28 PROCEDURE — 80048 BASIC METABOLIC PNL TOTAL CA: CPT | Performed by: INTERNAL MEDICINE

## 2025-08-28 RX ORDER — POTASSIUM CHLORIDE 1500 MG/1
20 TABLET, EXTENDED RELEASE ORAL DAILY
COMMUNITY
End: 2025-08-29

## 2025-08-28 RX ORDER — ATORVASTATIN CALCIUM 80 MG/1
80 TABLET, FILM COATED ORAL NIGHTLY
Qty: 30 TABLET | Refills: 11 | Status: SHIPPED | OUTPATIENT
Start: 2025-08-28

## 2025-08-28 RX ORDER — CLOPIDOGREL BISULFATE 75 MG/1
75 TABLET ORAL DAILY
Qty: 30 TABLET | Refills: 11 | Status: SHIPPED | OUTPATIENT
Start: 2025-08-28

## 2025-08-28 RX ORDER — POTASSIUM CHLORIDE 1.5 G/1.58G
20 POWDER, FOR SOLUTION ORAL DAILY
Qty: 30 PACKET | Refills: 11 | Status: SHIPPED | OUTPATIENT
Start: 2025-08-28

## 2025-08-28 RX ORDER — DAPAGLIFLOZIN 10 MG/1
10 TABLET, FILM COATED ORAL DAILY
Qty: 30 TABLET | Refills: 11 | Status: SHIPPED | OUTPATIENT
Start: 2025-08-28

## 2025-08-28 RX ORDER — ATORVASTATIN CALCIUM 80 MG/1
80 TABLET, FILM COATED ORAL NIGHTLY
COMMUNITY
End: 2025-08-29

## 2025-08-28 RX ORDER — AMIODARONE HYDROCHLORIDE 200 MG/1
200 TABLET ORAL DAILY
Qty: 30 TABLET | Refills: 3 | Status: SHIPPED | OUTPATIENT
Start: 2025-08-28

## 2025-08-28 RX ORDER — METOPROLOL SUCCINATE 50 MG/1
50 TABLET, EXTENDED RELEASE ORAL
Qty: 60 TABLET | Refills: 11 | Status: SHIPPED | OUTPATIENT
Start: 2025-08-28

## 2025-08-28 RX ORDER — ATORVASTATIN CALCIUM 80 MG/1
80 TABLET, FILM COATED ORAL NIGHTLY
Status: DISCONTINUED | OUTPATIENT
Start: 2025-08-28 | End: 2025-08-28

## 2025-08-28 RX ORDER — LOSARTAN POTASSIUM 50 MG/1
50 TABLET ORAL DAILY
Qty: 30 TABLET | Refills: 11 | Status: SHIPPED | OUTPATIENT
Start: 2025-08-28

## 2025-08-28 RX ORDER — SODIUM CHLORIDE 9 MG/ML
250 INJECTION, SOLUTION INTRAVENOUS ONCE
Status: COMPLETED | OUTPATIENT
Start: 2025-08-28 | End: 2025-08-28

## 2025-08-28 RX ORDER — FUROSEMIDE 20 MG/1
20 TABLET ORAL DAILY
Qty: 30 TABLET | Refills: 11 | Status: SHIPPED | OUTPATIENT
Start: 2025-08-28

## 2025-08-28 RX ADMIN — METOPROLOL SUCCINATE 50 MG: 50 TABLET, EXTENDED RELEASE ORAL at 05:15:00

## 2025-08-28 RX ADMIN — CLOPIDOGREL BISULFATE 75 MG: 75 TABLET ORAL at 08:22:00

## 2025-08-28 RX ADMIN — SODIUM CHLORIDE 250 ML: 9 INJECTION, SOLUTION INTRAVENOUS at 05:30:00

## 2025-08-28 RX ADMIN — AMIODARONE HYDROCHLORIDE 200 MG: 200 TABLET ORAL at 08:22:00

## 2025-08-28 RX ADMIN — LOSARTAN POTASSIUM 50 MG: 50 TABLET ORAL at 08:22:00

## 2025-08-28 RX ADMIN — ASPIRIN 81 MG: 81 TABLET ORAL at 08:22:00

## 2025-08-29 ENCOUNTER — PATIENT OUTREACH (OUTPATIENT)
Age: 85
End: 2025-08-29

## (undated) DIAGNOSIS — C61 PROSTATE CANCER (HCC): ICD-10-CM

## (undated) DIAGNOSIS — I10 ESSENTIAL HYPERTENSION: Primary | ICD-10-CM

## (undated) DIAGNOSIS — E78.00 PURE HYPERCHOLESTEROLEMIA: ICD-10-CM

## (undated) DEVICE — Device

## (undated) DEVICE — ENDOPATH ULTRA VERESS INSUFFLATION NEEDLES WITH LUER LOCK CONNECTORS: Brand: ENDOPATH

## (undated) DEVICE — LAP CHOLE/APPY CDS-LF: Brand: MEDLINE INDUSTRIES, INC.

## (undated) DEVICE — KENDALL SCD EXPRESS SLEEVES, KNEE LENGTH, MEDIUM: Brand: KENDALL SCD

## (undated) DEVICE — THE ECHELON FLEX POWERED PLUS ARTICULATING ENDOSCOPIC LINEAR CUTTERS ARE STERILE, SINGLE PATIENT USE INSTRUMENTS THAT SIMULTANEOUSLYCUT AND STAPLE TISSUE. THERE ARE SIX STAGGERED ROWS OF STAPLES, THREE ON EITHER SIDE OF THE CUT LINE. THE ECHELON FLEX 45 POWERED PLUSINSTRUMENTS HAVE A STAPLE LINE THAT IS APPROXIMATELY 45 MM LONG AND A CUT LINE THAT IS APPROXIMATELY 42 MM LONG. THE SHAFT CAN ROTATE FREELYIN BOTH DIRECTIONS AND AN ARTICULATION MECHANISM ENABLES THE DISTAL PORTION OF THE SHAFT TO PIVOT TO FACILITATE LATERAL ACCESS TO THE OPERATIVESITE.THE INSTRUMENTS ARE PACKAGED WITH A PRIMARY LITHIUM BATTERY PACK THAT MUST BE INSTALLED PRIOR TO USE. THERE ARE SPECIFIC REQUIREMENTS FORDISPOSING OF THE BATTERY PACK. REFER TO THE BATTERY PACK DISPOSAL SECTION.THE INSTRUMENTS ARE PACKAGED WITHOUT A RELOAD AND MUST BE LOADED PRIOR TO USE. A STAPLE RETAINING CAP ON THE RELOAD PROTECTS THE STAPLE LEGPOINTS DURING SHIPPING AND TRANSPORTATION. THE INSTRUMENTS’ LOCK-OUT FEATURE IS DESIGNED TO PREVENT A USED OR IMPROPERLY INSTALLED RELOADFROM BEING REFIRED OR AN INSTRUMENT FROM BEING FIRED WITHOUT A RELOAD.: Brand: ECHELON FLEX

## (undated) DEVICE — 3M(TM) TEGADERM(TM) TRANSPARENT FILM DRESSING FRAME STYLE 9505W: Brand: 3M™ TEGADERM™

## (undated) DEVICE — TROCAR: Brand: KII SHIELDED BLADED ACCESS SYSTEM

## (undated) DEVICE — SUTURE MONOCRYL 4-0 PS-2

## (undated) DEVICE — STERILE POLYISOPRENE POWDER-FREE SURGICAL GLOVES: Brand: PROTEXIS

## (undated) DEVICE — TISSUE RETRIEVAL SYSTEM: Brand: INZII RETRIEVAL SYSTEM

## (undated) DEVICE — LIGASURE LAP MARYLAND 37CM

## (undated) DEVICE — TROCAR: Brand: KII FIOS FIRST ENTRY

## (undated) DEVICE — GAUZE SPONGES,USP TYPE VII GAUZE, 12 PLY: Brand: CURITY

## (undated) DEVICE — THE ECHELON, ECHELON ENDOPATH™ AND ECHELON FLEX™ FAMILIES OF ENDOSCOPIC LINEAR CUTTERS AND RELOADS ARE STERILE, SINGLE PATIENT USE INSTRUMENTS THAT SIMULTANEOUSLY CUT AND STAPLE TISSUE. THERE ARE SIX STAGGERED ROWS OF STAPLES, THREE ON EITHER SIDE OF THE CUT LINE. THE 45 MM INSTRUMENTS HAVE A STAPLE LINE THATIS APPROXIMATELY 45 MM LONG AND A CUT LINE THAT IS APPROXIMATELY 42 MM LONG. THE SHAFT CAN ROTATE FREELY IN BOTH DIRECTIONS AND AN ARTICULATION MECHANISM ON ARTICULATING INSTRUMENTS ENABLES BENDING THE DISTAL PORTIONOF THE SHAFT TO FACILITATE LATERAL ACCESS OF THE OPERATIVE SITE.THE INSTRUMENTS ARE SHIPPED WITHOUT A RELOAD AND MUST BE LOADED PRIOR TO USE. A STAPLE RETAINING CAP ON THE RELOAD PROTECTS THE STAPLE LEG POINTS DURING SHIPPING AND TRANSPORTATION. THE INSTRUMENTS’ LOCK-OUT FEATURE IS DESIGNED TO PREVENT A USED RELOAD FROM BEING REFIRED.: Brand: ECHELON ENDOPATH

## (undated) DEVICE — TROCAR: Brand: KII® SLEEVE

## (undated) DEVICE — SOL  .9 1000ML BTL

## (undated) DEVICE — SUTURE PDS II 0 CT-1

## (undated) DEVICE — CHLORAPREP 26ML APPLICATOR

## (undated) DEVICE — VISUALIZATION SYSTEM: Brand: CLEARIFY

## (undated) NOTE — LETTER
[address] [facility]   Date:   12/2/2022     Name:   Lupe Caruso    YOB: 1940   MRN:   [id number]       WHERE IS YOUR PAIN NOW? Conrado the areas on your body where you feel the described sensations. Use the appropriate symbol. [place] the areas of radiation. Include all affected areas. Just to complete the picture, please draw in the face. ACHE:  ^ ^ ^   NUMBNESS:  0000   PINS & NEEDLES:  = = = =                              ^ ^ ^                       0000              = = = =                                    ^ ^ ^                       0000            = = = =      BURNING:  XXXX   STABBING: ////                  XXXX                ////                         XXXX          ////     Please conrado the line below indicating your degree of pain right now  with 0 being no pain 10 being the worst pain possible.                                          0             1             2              3             4              5              6              7             8             9             10         Patient Signature:

## (undated) NOTE — LETTER
[address]  [address]  Consent for Procedure/Sedation  Date: 7/24/2025         Time: 0758    I hereby authorize                           , my physician and his/her assistants (if applicable), which may include medical students, residents, and/or fellows, to perform the following surgical operation/ procedure and administer such anesthesia as may be determined necessary by my physician: Transesophageal echocardiogram (CHRISTA) on Nish Oneill  2.   I recognize that during the surgical operation/procedure, unforeseen conditions may necessitate additional or different procedures than those listed above.  I, therefore, further authorize and request that the above-named surgeon, assistants, or designees perform such procedures as are, in their judgment, necessary and desirable.    3.   My surgeon/physician has discussed prior to my surgery the potential benefits, risks and side effects of this procedure; the likelihood of achieving goals; and potential problems that might occur during recuperation.  They also discussed reasonable alternatives to the procedure, including risks, benefits, and side effects related to the alternatives and risks related to not receiving this procedure.  I have had all my questions answered and I acknowledge that no guarantee has been made as to the result that may be obtained.    4.   Should the need arise during my operation/procedure, which includes change of level of care prior to discharge, I also consent to the administration of blood and/or blood products.  Further, I understand that despite careful testing and screening of blood or blood products by collecting agencies, I may still be subject to ill effects as a result of receiving a blood transfusion and/or blood products.  The following are some, but not all, of the potential risks that can occur: fever and allergic reactions, hemolytic reactions, transmission of diseases such as Hepatitis,  AIDS and Cytomegalovirus (CMV) and fluid overload.  In the event that I wish to have an autologous transfusion of my own blood, or a directed donor transfusion, I will discuss this with my physician.   Check only if Refusing Blood or Blood Products  I understand refusal of blood or blood products as deemed necessary by my physician may have serious consequences to my condition to include possible death. I hereby assume responsibility for my refusal and release the hospital, its personnel, and my physicians from any responsibility for the consequences of my refusal.         o  Refuse         5.   I authorize the use of any specimen, organs, tissues, body parts or foreign objects that may be removed from my body during the operation/procedure for diagnosis, research or teaching purposes and their subsequent disposal by hospital authorities.  I also authorize the release of specimen test results and/or written reports to my treating physician on the hospital medical staff or other referring or consulting physicians involved in my care, at the discretion of the Pathologist or my treating physician.    6.   I consent to the photographing or videotaping of the operations or procedures to be performed, including appropriate portions of my body for medical, scientific, or educational purposes, provided my identity is not revealed by the pictures or by descriptive texts accompanying them.  If the procedure has been photographed/videotaped, the surgeon will obtain the original picture, image, videotape or CD.  The hospital will not be responsible for storage, release or maintenance of the picture, image, tape or CD.    7.   I consent to the presence of a  or observers in the operating room as deemed necessary by my physician or their designees.    8.   I recognize that in the event my procedure results in extended X-Ray/fluoroscopy time, I may develop a skin reaction.    9. If I have a Do Not Attempt  Resuscitation (DNAR) order in place, that status will be suspended while in the operating room, procedural suite, and during the recovery period unless otherwise explicitly stated by me (or a person authorized to consent on my behalf). The surgeon or my attending physician will determine when the applicable recovery period ends for purposes of reinstating the DNAR order.  10. Patients having a sterilization procedure: I understand that if the procedure is successful the results will be permanent and it will therefore be impossible for me to inseminate, conceive, or bear children.  I also understand that the procedure is intended to result in sterility, although the result has not been guaranteed.   11. I acknowledge that my physician has explained sedation/analgesia administration to me including the risk and benefits I consent to the administration of sedation/analgesia as may be necessary or desirable in the judgment of my physician.    I CERTIFY THAT I HAVE READ AND FULLY UNDERSTAND THE ABOVE CONSENT TO OPERATION and/or OTHER PROCEDURE.        ____________________________________       _________________________________      ______________________________  Signature of Patient         Signature of Responsible Person        Printed Name of Responsible Person        ____________________________________      _________________________________      ______________________________       Signature of Witness          Relationship to Patient                       Date                                       Time  Patient Name: Nish ALEXIA Oneill  : 1940    Reviewed: 2024   Printed: 2025  Medical Record #: [id number] Page 1 of 1

## (undated) NOTE — LETTER
[facility] [address] , [place]   Date:   3/2/2023     Name:   Adrianna Levy    YOB: 1940   MRN:   [id number]       WHERE IS YOUR PAIN NOW? Conrado the areas on your body where you feel the described sensations. Use the appropriate symbol. Priscila Bailey the areas of radiation. Include all affected areas. Just to complete the picture, please draw in the face. ACHE:  ^ ^ ^   NUMBNESS:  0000   PINS & NEEDLES:  = = = =                              ^ ^ ^                       0000              = = = =                                    ^ ^ ^                       0000            = = = =      BURNING:  XXXX   STABBING: ////                  XXXX                ////                         XXXX          ////     Please conrado the line below indicating your degree of pain right now  with 0 being no pain 10 being the worst pain possible.                                          0             1             2              3             4              5              6              7             8             9             10         Patient Signature:

## (undated) NOTE — LETTER
Patient Name: Aiyana Henning  YOB: 1940          MRN number:  [id number]  Date:  8/21/2017  Referring Physician:  Vivian Duran     Physical Therapy Discharge Summary    Pt has attended 8 visits in Physical Therapy.      Subjective: Montrell Pronation: R 5/5; L 5/5  Rhomboids: R4/5, L 4/5  Mid trap: R 4-/5; L 4-/5   Low trap: R 4-/5; L 4-/5     Special tests:   Neer: + (B)    Goals:   · Pt will report improved ability to sleep without waking due to shoulder pain.  Part met  · Pt will improve sh

## (undated) NOTE — LETTER
To Whom It May Concern: This certifies that Jagjit Buenrostro was seen in my office today. Please excuse him from work today. Patient to remain off work until follow up in four weeks. Do not hesitate to call with any questions or concerns.         Sincerely,    Lin Powell DO   [address]  [address] [place] [address]  [phone]        Document generated by:  Simon Rankin MA

## (undated) NOTE — LETTER
[facility], [place]   Date:   5/10/2023     Name:   Keaton Baron    YOB: 1940   MRN:   [id number]       WHERE IS YOUR PAIN NOW? Conrado the areas on your body where you feel the described sensations. Use the appropriate symbol. Natasha Gibbs the areas of radiation. Include all affected areas. Just to complete the picture, please draw in the face. ACHE:  ^ ^ ^   NUMBNESS:  0000   PINS & NEEDLES:  = = = =                              ^ ^ ^                       0000              = = = =                                    ^ ^ ^                       0000            = = = =      BURNING:  XXXX   STABBING: ////                  XXXX                ////                         XXXX          ////     Please conrado the line below indicating your degree of pain right now  with 0 being no pain 10 being the worst pain possible.                                          0             1             2              3             4              5              6              7             8             9             10         Patient Signature:

## (undated) NOTE — LETTER
Jayashree Pascual [address]  [facility], [address] [facility]    Consent for Operation  Date: __________________                                Time: _______________    1.  I authorize the performance upon Oren Hussein the following operation:  Proced procedure has been videotaped, the surgeon will obtain the original videotape. The hospital will not be responsible for storage or maintenance of this tape.   7. For the purpose of advancing medical education, I consent to the admittance of observers to the STATEMENTS REQUIRING INSERTION OR COMPLETION WERE FILLED IN.     Signature of Patient:   ___________________________    When the patient is a minor or mentally incompetent to give consent:  Signature of person authorized to consent for patient: ____________ supplements, and pills I can buy without a prescription (including street drugs/illegal medications). Failure to inform my anesthesiologist about these medicines may increase my risk of anesthetic complications. iv.  If I am allergic to anything or have ha Anesthesiologist Signature     Date   Time  I have discussed the procedure and information above with the patient (or patient’s representative) and answered their questions. The patient or their representative has agreed to have anesthesia services.     ___

## (undated) NOTE — LETTER
[facility] [address] [place], [facility]   Date:   3/27/2023     Name:   Nikita Nick    YOB: 1940   MRN:   [id number]       WHERE IS YOUR PAIN NOW? Conrado the areas on your body where you feel the described sensations. Use the appropriate symbol. [employer] the areas of radiation. Include all affected areas. Just to complete the picture, please draw in the face. ACHE:  ^ ^ ^   NUMBNESS:  0000   PINS & NEEDLES:  = = = =                              ^ ^ ^                       0000              = = = =                                    ^ ^ ^                       0000            = = = =      BURNING:  XXXX   STABBING: ////                  XXXX                ////                         XXXX          ////     Please conrado the line below indicating your degree of pain right now  with 0 being no pain 10 being the worst pain possible.                                          0             1             2              3             4              5              6              7             8             9             10         Patient Signature:

## (undated) NOTE — Clinical Note
FYI,TCM call made, see notes. patient reports he has an appointment on 9/9/19 with Dr. Lexus Calvin, [facility] preferred or no later than 9/7/19.  Patient requests that [facility] send a message to Dr. Hobson Senters